# Patient Record
Sex: FEMALE | Race: WHITE | NOT HISPANIC OR LATINO | Employment: OTHER | URBAN - METROPOLITAN AREA
[De-identification: names, ages, dates, MRNs, and addresses within clinical notes are randomized per-mention and may not be internally consistent; named-entity substitution may affect disease eponyms.]

---

## 2017-01-06 ENCOUNTER — INFUSION (OUTPATIENT)
Dept: ONCOLOGY | Facility: HOSPITAL | Age: 82
End: 2017-01-06

## 2017-01-06 DIAGNOSIS — C83.30 DIFFUSE LARGE B-CELL LYMPHOMA, UNSPECIFIED BODY REGION (HCC): Primary | ICD-10-CM

## 2017-01-06 PROCEDURE — 25010000002 HEPARIN FLUSH (PORCINE) 100 UNIT/ML SOLUTION: Performed by: INTERNAL MEDICINE

## 2017-01-06 PROCEDURE — 96523 IRRIG DRUG DELIVERY DEVICE: CPT

## 2017-01-06 RX ORDER — SODIUM CHLORIDE 0.9 % (FLUSH) 0.9 %
10 SYRINGE (ML) INJECTION AS NEEDED
Status: DISCONTINUED | OUTPATIENT
Start: 2017-01-06 | End: 2017-01-09 | Stop reason: HOSPADM

## 2017-01-06 RX ORDER — SODIUM CHLORIDE 0.9 % (FLUSH) 0.9 %
10 SYRINGE (ML) INJECTION AS NEEDED
Status: CANCELLED | OUTPATIENT
Start: 2017-01-06

## 2017-01-06 RX ADMIN — SODIUM CHLORIDE, PRESERVATIVE FREE 500 UNITS: 5 INJECTION INTRAVENOUS at 10:24

## 2017-01-06 RX ADMIN — Medication 10 ML: at 10:24

## 2017-01-12 ENCOUNTER — TELEPHONE (OUTPATIENT)
Dept: ONCOLOGY | Facility: HOSPITAL | Age: 82
End: 2017-01-12

## 2017-01-12 NOTE — TELEPHONE ENCOUNTER
----- Message from Stacey Barboza sent at 1/12/2017 12:44 PM EST -----   Pt wants to get a handicap parking permit        [phone]    Message forwarded to appt desk as this is not done through nursing

## 2017-01-20 RX ORDER — TIZANIDINE 2 MG/1
TABLET ORAL
Qty: 45 TABLET | Refills: 0 | OUTPATIENT
Start: 2017-01-20

## 2017-01-24 ENCOUNTER — TELEPHONE (OUTPATIENT)
Dept: FAMILY MEDICINE CLINIC | Facility: CLINIC | Age: 82
End: 2017-01-24

## 2017-01-24 RX ORDER — TIZANIDINE 2 MG/1
TABLET ORAL
Qty: 45 TABLET | Refills: 0 | Status: SHIPPED | OUTPATIENT
Start: 2017-01-24 | End: 2017-01-26 | Stop reason: SDUPTHER

## 2017-01-25 RX ORDER — TIZANIDINE 2 MG/1
TABLET ORAL
Qty: 135 TABLET | Refills: 0 | OUTPATIENT
Start: 2017-01-25

## 2017-01-26 ENCOUNTER — OFFICE VISIT (OUTPATIENT)
Dept: FAMILY MEDICINE CLINIC | Facility: CLINIC | Age: 82
End: 2017-01-26

## 2017-01-26 VITALS
HEIGHT: 66 IN | DIASTOLIC BLOOD PRESSURE: 80 MMHG | OXYGEN SATURATION: 98 % | SYSTOLIC BLOOD PRESSURE: 122 MMHG | HEART RATE: 91 BPM | TEMPERATURE: 97.3 F | WEIGHT: 147 LBS | RESPIRATION RATE: 16 BRPM | BODY MASS INDEX: 23.63 KG/M2

## 2017-01-26 DIAGNOSIS — M62.838 MUSCLE SPASM OF LEFT LOWER EXTREMITY: Primary | ICD-10-CM

## 2017-01-26 PROCEDURE — 99212 OFFICE O/P EST SF 10 MIN: CPT | Performed by: FAMILY MEDICINE

## 2017-01-26 RX ORDER — TIZANIDINE 2 MG/1
TABLET ORAL
Qty: 45 TABLET | Refills: 11 | Status: SHIPPED | OUTPATIENT
Start: 2017-01-26

## 2017-01-26 NOTE — MR AVS SNAPSHOT
Guerita De La Fuente   1/26/2017 11:30 AM   Office Visit    Provider:  Vern Alvarez MD   Department:  [facility] FAMILY MEDICINE   Dept Phone:  [phone]                Your Full Care Plan              Today's Medication Changes          These changes are accurate as of: 1/26/17 11:53 AM.  If you have any questions, ask your nurse or doctor.               Stop taking medication(s)listed here:     cefdinir 300 MG capsule   Commonly known as:  OMNICEF   Stopped by:  Vern Alvarez MD           levoFLOXacin 500 MG tablet   Commonly known as:  LEVAQUIN   Stopped by:  Vern Alvarez MD                Where to Get Your Medications      These medications were sent to [email or web address] [address] [place] [address] [address] AT [facility]([place] - [phone] [facility] [phone] [facility], [facility] [address]     Phone:  [phone]     tiZANidine 2 MG tablet                  Your Updated Medication List          This list is accurate as of: 1/26/17 11:53 AM.  Always use your most recent med list.                acetaminophen 500 MG tablet   Commonly known as:  TYLENOL       calcium carbonate-cholecalciferol 500-400 MG-UNIT tablet tablet       estrogens (conjugated) 0.3 MG tablet   Commonly known as:  PREMARIN       glucosamine-chondroitin 500-400 MG capsule capsule       multivitamins-minerals capsule capsule       tiZANidine 2 MG tablet   Commonly known as:  ZANAFLEX   Take 1 and 1/2 pill daily               You Were Diagnosed With        Codes Comments    Muscle spasm of left lower extremity    -  Primary ICD-10-CM: M62.838  ICD-9-CM: 728.85       Instructions     None    Patient Instructions History      [employer] Signup     Our records indicate that you have an active [email or web address] account.    You can view your After Visit Summary by going to [employer] and logging in with your [employer] username and password.  If  "you don't have a [facility] username and password but a parent or guardian has access to your record, the parent or guardian should login with their own [facility] username and password and access your record to view the After Visit Summary.    If you have questions, you can email [email or web address] or call [phone] to talk to our LX[facility] staff.  Remember, LXSN is NOT to be used for urgent needs.  For medical emergencies, dial 911.               Other Info from Your Visit           Your Appointments     Feb 03, 2017 10:30 AM EST   Infusion Therapy Infusion with CHAIR 16 [facility] INFUSION CBC ([place])    [address] [facility] [address]  [place] [id number]   [phone]            Mar 03, 2017  8:00 AM EST   Infusion Therapy Infusion with CHAIR 16 [facility] INFUSION CBC ([place])    [address] [address]   [phone]            Mar 03, 2017 12:20 PM EST   Lab with LAB CHAIR 6 [facility] ONCOLOGY CBC LAB ([place])    [address] [facility] [address]  [place] [id number]   [phone]            Mar 03, 2017  1:00 PM EST   FOLLOW UP with Cyrus Ramírez MD   [facility] CBC GROUP: CONSULTANTS IN BLOOD DISORDERS AND CANCER ([facility])    [address] [facility] [address]  [facility] [address]   [phone]              Allergies     Allopurinol      Codeine  GI Intolerance    Levaquin [Levofloxacin]      Shellfish-derived Products  Other (See Comments)    \"CRABS\" \"I CAN'T REMEMBER WHAT THE REACTION WAS\"    Penicillins  Rash    Sulfa Antibiotics  Rash      Reason for Visit     Muscle spasms           Vital Signs     Blood Pressure Pulse Temperature Respirations Height Weight    122/80 (BP Location: Left arm, Patient Position: Sitting, Cuff Size: Adult) 91 97.3 °F (36.3 °C) (Oral) 16 65.5\" (166.4 cm) 147 lb (66.7 kg)    Oxygen Saturation Body Mass Index Smoking Status             98% " 24.09 kg/m2 Never Smoker         Problems and Diagnoses Noted     Muscle spasm of left lower extremity    -  Primary

## 2017-01-26 NOTE — PROGRESS NOTES
"Subjective   Guerita De La Fuente is a 83 y.o. female.     History of Present Illness     Chief Complaint:   Chief Complaint   Patient presents with   • Muscle spasms       Guerita De La Fuente 83 y.o. female who presents today for Medical Management of the below listed issues and medication refills.  she has a history of   Patient Active Problem List   Diagnosis   • Hip pain   • Diffuse large B cell lymphoma   • Drug rash   • Chemotherapy induced diarrhea   .  Since the last visit, she has been seeing the oncologist for lymphoma treatments.  she has been compliant with   Current Outpatient Prescriptions:   •  tiZANidine (ZANAFLEX) 2 MG tablet, Take 1 and 1/2 pill daily, Disp: 45 tablet, Rfl: 11  •  acetaminophen (TYLENOL) 500 MG tablet, Take 500 mg by mouth every 6 (six) hours as needed for mild pain (1-3)., Disp: , Rfl:   •  calcium carbonate-cholecalciferol 500-400 MG-UNIT tablet tablet, Take 1 tablet by mouth 3 (three) times a day., Disp: , Rfl:   •  estrogens, conjugated, (PREMARIN) 0.3 MG tablet, Take 0.3 mg by mouth daily., Disp: , Rfl:   •  glucosamine-chondroitin 500-400 MG capsule capsule, Take 1 capsule by mouth daily. PT HOLDING FOR SURGERY, Disp: , Rfl:   •  Multiple Vitamins-Minerals (PRESERVISION AREDS 2) capsule, Take 1 capsule by mouth 2 (two) times a day., Disp: , Rfl: .  she denies medication side effects.    All of the chronic condition(s) listed above are stable w/o issues.    Visit Vitals   • /80 (BP Location: Left arm, Patient Position: Sitting, Cuff Size: Adult)   • Pulse 91   • Temp 97.3 °F (36.3 °C) (Oral)   • Resp 16   • Ht 65.5\" (166.4 cm)   • Wt 147 lb (66.7 kg)   • SpO2 98%   • BMI 24.09 kg/m2       Results for orders placed or performed in visit on 12/09/16   Comprehensive Metabolic Panel   Result Value Ref Range    Glucose 85 74 - 124 mg/dL    BUN 19 6 - 20 mg/dL    Creatinine 0.69 0.60 - 1.10 mg/dL    Sodium 138 134 - 145 mmol/L    Potassium 4.1 3.5 - 4.7 mmol/L    Chloride 101 98 - " 107 mmol/L    CO2 24.5 22.0 - 29.0 mmol/L    Calcium 8.9 8.5 - 10.2 mg/dL    Total Protein 6.7 6.3 - 8.0 g/dL    Albumin 3.90 3.50 - 5.20 g/dL    ALT (SGPT) 19 0 - 33 U/L    AST (SGOT) 35 (H) 0 - 32 U/L    Alkaline Phosphatase 99 38 - 116 U/L    Total Bilirubin 0.2 0.1 - 1.2 mg/dL    eGFR Non African Amer 81 >60 mL/min/1.73    Globulin 2.8 1.8 - 3.5 gm/dL    A/G Ratio 1.4 1.1 - 2.4 g/dL    BUN/Creatinine Ratio 27.5 7.3 - 30.0    Anion Gap 12.5 mmol/L   Lactate Dehydrogenase   Result Value Ref Range     (H) 99 - 259 U/L   CBC Auto Differential   Result Value Ref Range    WBC 5.60 4.00 - 10.00 10*3/mm3    RBC 4.12 3.90 - 5.00 10*6/mm3    Hemoglobin 11.7 11.5 - 14.9 g/dL    Hematocrit 34.5 34.0 - 45.0 %    MCV 83.7 83.0 - 97.0 fL    MCH 28.4 27.0 - 33.0 pg    MCHC 33.9 32.0 - 35.0 g/dL    RDW 13.5 11.7 - 14.5 %    RDW-SD 40.7 37.0 - 49.0 fl    MPV 9.2 8.9 - 12.1 fL    Platelets 211 150 - 375 10*3/mm3    Neutrophil % 62.5 39.0 - 75.0 %    Lymphocyte % 25.2 20.0 - 49.0 %    Monocyte % 8.9 4.0 - 12.0 %    Eosinophil % 2.3 1.0 - 5.0 %    Basophil % 0.7 0.0 - 1.1 %    Immature Grans % 0.4 0.0 - 0.5 %    Neutrophils, Absolute 3.50 1.50 - 7.00 10*3/mm3    Lymphocytes, Absolute 1.41 1.00 - 3.50 10*3/mm3    Monocytes, Absolute 0.50 0.25 - 0.80 10*3/mm3    Eosinophils, Absolute 0.13 0.00 - 0.36 10*3/mm3    Basophils, Absolute 0.04 0.00 - 0.10 10*3/mm3    Immature Grans, Absolute 0.02 0.00 - 0.03 10*3/mm3    nRBC 0.0 0.0 - 0.0 /100 WBC   Gold Top - SST   Result Value Ref Range    Extra Tube Hold for add-ons.          The following portions of the patient's history were reviewed and updated as appropriate: allergies, current medications, past family history, past medical history, past social history, past surgical history and problem list.    Review of Systems   Constitutional: Negative for activity change, chills, fatigue and fever.   Respiratory: Negative for cough and chest tightness.    Cardiovascular: Negative for  chest pain and palpitations.   Gastrointestinal: Negative for abdominal pain and nausea.   Endocrine: Negative for cold intolerance.   Psychiatric/Behavioral: Negative for behavioral problems and dysphoric mood.       Objective   Physical Exam   Constitutional: She appears well-developed and well-nourished.   Neck: Neck supple. No thyromegaly present.   Cardiovascular: Normal rate and regular rhythm.    No murmur heard.  Pulmonary/Chest: Effort normal and breath sounds normal.   Abdominal: Bowel sounds are normal.   Psychiatric: She has a normal mood and affect. Her behavior is normal.   Nursing note and vitals reviewed.      Assessment/Plan   Guerita was seen today for muscle spasms.    Diagnoses and all orders for this visit:    Muscle spasm of left lower extremity  -     tiZANidine (ZANAFLEX) 2 MG tablet; Take 1 and 1/2 pill daily

## 2017-01-27 ENCOUNTER — TELEPHONE (OUTPATIENT)
Dept: ONCOLOGY | Facility: HOSPITAL | Age: 82
End: 2017-01-27

## 2017-01-27 NOTE — TELEPHONE ENCOUNTER
Attempted to call. No answer, left message stating it is ok for flu shot.     ----- Message from Cyrus Ramírez MD sent at 1/26/2017  9:15 PM EST -----  Please let her know it's OK to get a flu shot.    ----- Message -----     From: Vern Alvarez MD     Sent: 1/26/2017  11:48 AM       To: Cyrus Ramírez MD    Our mutual patient is asking me if she can receive a Flu shot. I asked her to check with your office. If you don't mind having one of your staff contact her with this I would appreciate it! Have a great day.

## 2017-02-03 ENCOUNTER — INFUSION (OUTPATIENT)
Dept: ONCOLOGY | Facility: HOSPITAL | Age: 82
End: 2017-02-03

## 2017-02-03 DIAGNOSIS — C83.30 DIFFUSE LARGE B-CELL LYMPHOMA, UNSPECIFIED BODY REGION (HCC): Primary | ICD-10-CM

## 2017-02-03 PROCEDURE — 25010000002 HEPARIN FLUSH (PORCINE) 100 UNIT/ML SOLUTION: Performed by: INTERNAL MEDICINE

## 2017-02-03 PROCEDURE — 96523 IRRIG DRUG DELIVERY DEVICE: CPT

## 2017-02-03 RX ORDER — SODIUM CHLORIDE 0.9 % (FLUSH) 0.9 %
10 SYRINGE (ML) INJECTION AS NEEDED
Status: CANCELLED | OUTPATIENT
Start: 2017-02-03

## 2017-02-03 RX ORDER — SODIUM CHLORIDE 0.9 % (FLUSH) 0.9 %
10 SYRINGE (ML) INJECTION AS NEEDED
Status: DISCONTINUED | OUTPATIENT
Start: 2017-02-03 | End: 2017-02-03 | Stop reason: HOSPADM

## 2017-02-03 RX ADMIN — Medication 10 ML: at 10:29

## 2017-02-03 RX ADMIN — SODIUM CHLORIDE, PRESERVATIVE FREE 500 UNITS: 5 INJECTION INTRAVENOUS at 10:29

## 2017-02-22 RX ORDER — TIZANIDINE 2 MG/1
TABLET ORAL
Qty: 45 TABLET | Refills: 0 | OUTPATIENT
Start: 2017-02-22

## 2017-03-02 PROBLEM — Z45.2 FITTING AND ADJUSTMENT OF VASCULAR CATHETER: Status: ACTIVE | Noted: 2017-03-02

## 2017-03-03 ENCOUNTER — HOSPITAL ENCOUNTER (EMERGENCY)
Facility: HOSPITAL | Age: 82
Discharge: HOME | End: 2017-03-03
Attending: EMERGENCY MEDICINE | Admitting: EMERGENCY MEDICINE

## 2017-03-03 ENCOUNTER — APPOINTMENT (OUTPATIENT)
Dept: ONCOLOGY | Facility: CLINIC | Age: 82
End: 2017-03-03

## 2017-03-03 ENCOUNTER — APPOINTMENT (OUTPATIENT)
Dept: CT IMAGING | Facility: HOSPITAL | Age: 82
End: 2017-03-03

## 2017-03-03 ENCOUNTER — APPOINTMENT (OUTPATIENT)
Dept: GENERAL RADIOLOGY | Facility: HOSPITAL | Age: 82
End: 2017-03-03

## 2017-03-03 ENCOUNTER — APPOINTMENT (OUTPATIENT)
Dept: ONCOLOGY | Facility: HOSPITAL | Age: 82
End: 2017-03-03

## 2017-03-03 ENCOUNTER — APPOINTMENT (OUTPATIENT)
Dept: LAB | Facility: HOSPITAL | Age: 82
End: 2017-03-03

## 2017-03-03 ENCOUNTER — OFFICE VISIT (OUTPATIENT)
Dept: RETAIL CLINIC | Facility: CLINIC | Age: 82
End: 2017-03-03

## 2017-03-03 VITALS — RESPIRATION RATE: 22 BRPM | OXYGEN SATURATION: 95 % | TEMPERATURE: 97.7 F | HEART RATE: 94 BPM

## 2017-03-03 VITALS
SYSTOLIC BLOOD PRESSURE: 100 MMHG | HEIGHT: 66 IN | DIASTOLIC BLOOD PRESSURE: 84 MMHG | RESPIRATION RATE: 17 BRPM | TEMPERATURE: 96.9 F | OXYGEN SATURATION: 97 % | BODY MASS INDEX: 23.03 KG/M2 | WEIGHT: 143.3 LBS | HEART RATE: 63 BPM

## 2017-03-03 DIAGNOSIS — R05.9 COUGH: Primary | ICD-10-CM

## 2017-03-03 DIAGNOSIS — R50.9 LOW GRADE FEVER: ICD-10-CM

## 2017-03-03 DIAGNOSIS — R53.1 GENERAL WEAKNESS: Primary | ICD-10-CM

## 2017-03-03 DIAGNOSIS — M54.9 UPPER BACK PAIN: ICD-10-CM

## 2017-03-03 DIAGNOSIS — R06.02 SHORT OF BREATH ON EXERTION: ICD-10-CM

## 2017-03-03 LAB
ALBUMIN SERPL-MCNC: 3.9 G/DL (ref 3.5–5.2)
ALBUMIN/GLOB SERPL: 1.2 G/DL
ALP SERPL-CCNC: 160 U/L (ref 39–117)
ALT SERPL W P-5'-P-CCNC: 28 U/L (ref 1–33)
ANION GAP SERPL CALCULATED.3IONS-SCNC: 15.6 MMOL/L
AST SERPL-CCNC: 136 U/L (ref 1–32)
BASOPHILS # BLD AUTO: 0.06 10*3/MM3 (ref 0–0.2)
BASOPHILS NFR BLD AUTO: 0.8 % (ref 0–1.5)
BILIRUB SERPL-MCNC: 0.4 MG/DL (ref 0.1–1.2)
BUN BLD-MCNC: 18 MG/DL (ref 8–23)
BUN/CREAT SERPL: 23.7 (ref 7–25)
CALCIUM SPEC-SCNC: 9.3 MG/DL (ref 8.6–10.5)
CHLORIDE SERPL-SCNC: 98 MMOL/L (ref 98–107)
CO2 SERPL-SCNC: 23.4 MMOL/L (ref 22–29)
CREAT BLD-MCNC: 0.76 MG/DL (ref 0.57–1)
D DIMER PPP FEU-MCNC: 1.26 MCGFEU/ML (ref 0–0.49)
DEPRECATED RDW RBC AUTO: 45.1 FL (ref 37–54)
EOSINOPHIL # BLD AUTO: 0.04 10*3/MM3 (ref 0–0.7)
EOSINOPHIL NFR BLD AUTO: 0.5 % (ref 0.3–6.2)
ERYTHROCYTE [DISTWIDTH] IN BLOOD BY AUTOMATED COUNT: 14.4 % (ref 11.7–13)
FLUAV AG NPH QL: NEGATIVE
FLUBV AG NPH QL IA: NEGATIVE
GFR SERPL CREATININE-BSD FRML MDRD: 73 ML/MIN/1.73
GLOBULIN UR ELPH-MCNC: 3.2 GM/DL
GLUCOSE BLD-MCNC: 103 MG/DL (ref 65–99)
HCT VFR BLD AUTO: 36 % (ref 35.6–45.5)
HGB BLD-MCNC: 12.1 G/DL (ref 11.9–15.5)
HOLD SPECIMEN: NORMAL
HOLD SPECIMEN: NORMAL
IMM GRANULOCYTES # BLD: 0.02 10*3/MM3 (ref 0–0.03)
IMM GRANULOCYTES NFR BLD: 0.3 % (ref 0–0.5)
LYMPHOCYTES # BLD AUTO: 1.43 10*3/MM3 (ref 0.9–4.8)
LYMPHOCYTES NFR BLD AUTO: 19.1 % (ref 19.6–45.3)
MCH RBC QN AUTO: 28.5 PG (ref 26.9–32)
MCHC RBC AUTO-ENTMCNC: 33.6 G/DL (ref 32.4–36.3)
MCV RBC AUTO: 84.7 FL (ref 80.5–98.2)
MONOCYTES # BLD AUTO: 0.74 10*3/MM3 (ref 0.2–1.2)
MONOCYTES NFR BLD AUTO: 9.9 % (ref 5–12)
NEUTROPHILS # BLD AUTO: 5.2 10*3/MM3 (ref 1.9–8.1)
NEUTROPHILS NFR BLD AUTO: 69.4 % (ref 42.7–76)
NT-PROBNP SERPL-MCNC: 212.5 PG/ML (ref 0–1800)
PLATELET # BLD AUTO: 196 10*3/MM3 (ref 140–500)
PMV BLD AUTO: 10.3 FL (ref 6–12)
POTASSIUM BLD-SCNC: 4.2 MMOL/L (ref 3.5–5.2)
PROT SERPL-MCNC: 7.1 G/DL (ref 6–8.5)
RBC # BLD AUTO: 4.25 10*6/MM3 (ref 3.9–5.2)
SODIUM BLD-SCNC: 137 MMOL/L (ref 136–145)
TROPONIN T SERPL-MCNC: <0.01 NG/ML (ref 0–0.03)
WBC NRBC COR # BLD: 7.49 10*3/MM3 (ref 4.5–10.7)
WHOLE BLOOD HOLD SPECIMEN: NORMAL

## 2017-03-03 PROCEDURE — 71020 HC CHEST PA AND LATERAL: CPT

## 2017-03-03 PROCEDURE — 93010 ELECTROCARDIOGRAM REPORT: CPT | Performed by: INTERNAL MEDICINE

## 2017-03-03 PROCEDURE — 99213 OFFICE O/P EST LOW 20 MIN: CPT | Performed by: NURSE PRACTITIONER

## 2017-03-03 PROCEDURE — 93005 ELECTROCARDIOGRAM TRACING: CPT | Performed by: EMERGENCY MEDICINE

## 2017-03-03 PROCEDURE — 83880 ASSAY OF NATRIURETIC PEPTIDE: CPT | Performed by: EMERGENCY MEDICINE

## 2017-03-03 PROCEDURE — 87804 INFLUENZA ASSAY W/OPTIC: CPT | Performed by: EMERGENCY MEDICINE

## 2017-03-03 PROCEDURE — 80053 COMPREHEN METABOLIC PANEL: CPT | Performed by: EMERGENCY MEDICINE

## 2017-03-03 PROCEDURE — 85025 COMPLETE CBC W/AUTO DIFF WBC: CPT | Performed by: EMERGENCY MEDICINE

## 2017-03-03 PROCEDURE — 99283 EMERGENCY DEPT VISIT LOW MDM: CPT

## 2017-03-03 PROCEDURE — 25010000002 KETOROLAC TROMETHAMINE PER 15 MG: Performed by: EMERGENCY MEDICINE

## 2017-03-03 PROCEDURE — 96374 THER/PROPH/DIAG INJ IV PUSH: CPT

## 2017-03-03 PROCEDURE — 84484 ASSAY OF TROPONIN QUANT: CPT | Performed by: EMERGENCY MEDICINE

## 2017-03-03 PROCEDURE — 0 IOPAMIDOL PER 1 ML: Performed by: EMERGENCY MEDICINE

## 2017-03-03 PROCEDURE — 85379 FIBRIN DEGRADATION QUANT: CPT | Performed by: EMERGENCY MEDICINE

## 2017-03-03 PROCEDURE — 71275 CT ANGIOGRAPHY CHEST: CPT

## 2017-03-03 RX ORDER — KETOROLAC TROMETHAMINE 15 MG/ML
15 INJECTION, SOLUTION INTRAMUSCULAR; INTRAVENOUS ONCE
Status: COMPLETED | OUTPATIENT
Start: 2017-03-03 | End: 2017-03-03

## 2017-03-03 RX ORDER — SODIUM CHLORIDE 0.9 % (FLUSH) 0.9 %
10 SYRINGE (ML) INJECTION AS NEEDED
Status: DISCONTINUED | OUTPATIENT
Start: 2017-03-03 | End: 2017-03-03 | Stop reason: HOSPADM

## 2017-03-03 RX ORDER — ASPIRIN 325 MG
325 TABLET ORAL ONCE
Status: COMPLETED | OUTPATIENT
Start: 2017-03-03 | End: 2017-03-03

## 2017-03-03 RX ADMIN — IOPAMIDOL 74 ML: 755 INJECTION, SOLUTION INTRAVENOUS at 13:40

## 2017-03-03 RX ADMIN — KETOROLAC TROMETHAMINE 15 MG: 15 INJECTION, SOLUTION INTRAMUSCULAR; INTRAVENOUS at 12:24

## 2017-03-03 RX ADMIN — ASPIRIN 325 MG: 325 TABLET ORAL at 12:23

## 2017-03-03 NOTE — PROGRESS NOTES
"Subjective   Patient ID: Guerita De La Fuente is a 83 y.o. female presents with No chief complaint on file.      HPI Comments: 82 yo wf  PMH: non-hodgkins lymphoma groin, supra clavicular and L axilla  Cc: fever tmax 100.1 x 1 week, intermittent cough non-productive with mild SOB worse on exertion and weakness x 8 days. Assoc pain to L pos chest. She has had to decrease her activity over past 2 weeks. She did have a pneumovax and flu shot this year. She was treated for bronchitis in January and took abx and saw good improvement. She also takes premarin daily. No hx DVT or PE in the past. Pt had oncology appt today but it was cancelled by MD due to emergency.        Allergies   Allergen Reactions   • Allopurinol    • Codeine GI Intolerance   • Levaquin [Levofloxacin]    • Shellfish-Derived Products Other (See Comments)     \"CRABS\" \"I CAN'T REMEMBER WHAT THE REACTION WAS\"   • Penicillins Rash   • Sulfa Antibiotics Rash       The following portions of the patient's history were reviewed and updated as appropriate: allergies, current medications, past family history, past medical history, past social history, past surgical history and problem list.      Review of Systems   Constitutional: Positive for fatigue and fever. Negative for activity change, chills and unexpected weight change.   HENT: Negative for congestion, ear pain, postnasal drip, rhinorrhea, sinus pressure and sore throat.         Hoarseness   Eyes: Negative for pain and discharge.   Respiratory: Positive for cough and shortness of breath. Negative for chest tightness and wheezing.    Cardiovascular: Negative for chest pain, palpitations and leg swelling.   Gastrointestinal: Negative for abdominal pain, diarrhea and vomiting.   Endocrine: Negative.    Genitourinary: Negative.    Musculoskeletal: Negative for joint swelling and neck pain.   Skin: Negative for color change, rash and wound.   Allergic/Immunologic: Negative.    Neurological: Negative for " dizziness, seizures, syncope and headaches.   Psychiatric/Behavioral: Negative.        Objective     Vitals:    03/03/17 1047   Pulse: 94   Resp: 22   Temp: 97.7 °F (36.5 °C)   SpO2: 95%         Physical Exam   Constitutional: She is oriented to person, place, and time. She appears well-developed and well-nourished.  Non-toxic appearance. No distress.   HENT:   Head: Normocephalic and atraumatic. Hair is normal.   Right Ear: Hearing, tympanic membrane, external ear and ear canal normal. No drainage, swelling or tenderness.   Left Ear: Hearing, tympanic membrane, external ear and ear canal normal. No drainage, swelling or tenderness.   Nose: Nose normal. No mucosal edema. No epistaxis.   Mouth/Throat: Uvula is midline, oropharynx is clear and moist and mucous membranes are normal. No oral lesions. No uvula swelling. No oropharyngeal exudate or posterior oropharyngeal erythema. Tonsils are 0 on the right. Tonsils are 0 on the left. No tonsillar exudate.   Eyes: Conjunctivae, EOM and lids are normal. Pupils are equal, round, and reactive to light. Right eye exhibits no discharge. Left eye exhibits no discharge. No scleral icterus.   Neck: Normal range of motion. Neck supple.   Cardiovascular: Normal rate, regular rhythm and normal heart sounds.  Exam reveals no gallop.    No murmur heard.  No LE edema noted   Pulmonary/Chest: No stridor. Tachypnea (resp rate 22 at rest) noted. No respiratory distress. She has no decreased breath sounds. She has no wheezes. She has no rales. She exhibits no tenderness.   Abdominal: Soft. There is no tenderness.   Lymphadenopathy:        Head (right side): No tonsillar adenopathy present.        Head (left side): No tonsillar adenopathy present.     She has no cervical adenopathy.   Neurological: She is alert and oriented to person, place, and time. She exhibits normal muscle tone.   Skin: Skin is warm and dry. No rash noted. She is not diaphoretic.   Psychiatric: She has a normal mood  and affect. Her behavior is normal. Judgment and thought content normal.   Nursing note and vitals reviewed.        Diagnoses and all orders for this visit:    Cough  -     Cancel: XR Chest PA & Lateral    Short of breath on exertion    I have advised Guerita to seek further eval at local Er. She was offered ambulance but declines insisting on driving herself.  She has a son nearby who can meet her there.  I do believe that she is stable to drive herself.  We will assist her to her vehicle by W/C.     Follow-up with Primary Care Physician in 24-48 hours if these symptoms worsen or fail to improve as anticipated.

## 2017-03-03 NOTE — ED PROVIDER NOTES
" EMERGENCY DEPARTMENT ENCOUNTER    CHIEF COMPLAINT  Chief Complaint: Back Pain  History given by: Patient  History limited by: Nothing  Room Number: 10/10  PMD: Vern Alvarez MD      HPI:  Pt is a 83 y.o. female presents complaining of intermittent, mid/ upper back pain with associated productive cough, SOA, generalized weakness and low grade fever as high as 100 for the past week. She states that her throat has also been mildly \"scratchy\" since onset as well. She denies back pain with inspiration. The patient has h/o lymphoma but is not actively receiving chemotherapy treatments. She denies nausea, pedal edema, hemoptysis, vomiting or dysuria since onset. She was seen by an APRN PTA and was sent to the ED for further evaluation to rule out PE.The patient received the flu vaccine 6 weeks ago and has no other complaints.     Duration:  One week  Onset: Gradual  Timing: Constant  Location: Generalized  Radiation: None  Quality: Weakness  Intensity/Severity: Moderate  Progression: No Change  Associated Symptoms: Back pain, cough, SOA, generalized weakness, fever, \"scratchy\" throat   Aggravating Factors: None  Alleviating Factors: None  Previous Episodes: None mentioned   Treatment before arrival: None mentioned     PAST MEDICAL HISTORY  Active Ambulatory Problems     Diagnosis Date Noted   • Hip pain 11/17/2015   • Diffuse large B cell lymphoma 04/21/2016   • Drug rash 05/20/2016   • Chemotherapy induced diarrhea 06/17/2016   • Fitting and adjustment of vascular catheter 03/02/2017   • Short of breath on exertion 03/03/2017   • Cough 03/03/2017     Resolved Ambulatory Problems     Diagnosis Date Noted   • No Resolved Ambulatory Problems     Past Medical History   Diagnosis Date   • Arthritis    • Cancer    • GERD (gastroesophageal reflux disease)    • History of transfusion    • Macular degeneration of both eyes    • Muscle ache    • Torn ligament        PAST SURGICAL HISTORY  Past Surgical History   Procedure " "Laterality Date   • Mammo bilateral  2014     normal   • Orthopedic surgery       hip right 4 pins   • Hysterectomy     • Tonsillectomy and adenoidectomy     • Eye surgery Bilateral      cataracts   • Carpal tunnel release Bilateral    • Hip pinning Right 07/1992   • Vein surgery Bilateral      LEGS   • Pr insj tunneled cvc w/o subq port/ age 5 yr/> N/A 4/29/2016     Procedure: INSERTION POWERPORT WITH FLURO;  Surgeon: Mihir Arrieta MD;  [facility] [place] [facility]  Service: General       FAMILY HISTORY  Family History   Problem Relation Age of Onset   • Alcohol abuse Mother    • Cancer Brother 65     lung cancer       SOCIAL HISTORY  Social History     Social History   • Marital status: Single     Spouse name: N/A   • Number of children: N/A   • Years of education: College     Occupational History   • RN Retired     Social History Main Topics   • Smoking status: Never Smoker   • Smokeless tobacco: Never Used   • Alcohol use No   • Drug use: No   • Sexual activity: Defer     Other Topics Concern   • Not on file     Social History Narrative       ALLERGIES  Allopurinol; Codeine; Levaquin [levofloxacin]; Shellfish-derived products; Penicillins; and Sulfa antibiotics    REVIEW OF SYSTEMS  Review of Systems   Constitutional: Positive for fever. Negative for chills and fatigue.   HENT: Positive for sore throat (\"scratchy\"). Negative for congestion and rhinorrhea.    Eyes: Negative for pain.   Respiratory: Positive for cough and shortness of breath. Negative for wheezing.    Cardiovascular: Negative for chest pain, palpitations and leg swelling.   Gastrointestinal: Negative for abdominal pain, diarrhea, nausea and vomiting.   Genitourinary: Negative for difficulty urinating, dysuria, flank pain and frequency.   Musculoskeletal: Positive for back pain. Negative for arthralgias, myalgias, neck pain and neck stiffness.   Skin: Negative for rash.   Neurological: Positive for weakness. Negative for dizziness, speech " difficulty, light-headedness, numbness and headaches.   Psychiatric/Behavioral: Negative.    All other systems reviewed and are negative.      PHYSICAL EXAM  ED Triage Vitals   Temp Heart Rate Resp BP SpO2   03/03/17 1135 03/03/17 1135 03/03/17 1135 03/03/17 1155 03/03/17 1135   97.6 °F (36.4 °C) 113 20 144/72 99 %      Temp src Heart Rate Source Patient Position BP Location FiO2 (%)   03/03/17 1135 03/03/17 1135 03/03/17 1155 03/03/17 1155 --   Tympanic Monitor Lying Left arm        Physical Exam   Constitutional: She is oriented to person, place, and time and well-developed, well-nourished, and in no distress. No distress.   HENT:   Head: Normocephalic and atraumatic.   Eyes: EOM are normal. Pupils are equal, round, and reactive to light.   Neck: Normal range of motion. Neck supple.   Cardiovascular: Normal rate, regular rhythm and normal heart sounds.    Pulmonary/Chest: Effort normal and breath sounds normal. No respiratory distress.   Abdominal: Soft. There is no tenderness. There is no rebound and no guarding.   Musculoskeletal: Normal range of motion. She exhibits no edema.   Neurological: She is alert and oriented to person, place, and time. She has normal sensation and normal strength.   Skin: Skin is warm and dry. No rash noted.   Psychiatric: Mood and affect normal.   Nursing note and vitals reviewed.      LAB RESULTS  Lab Results (last 24 hours)     Procedure Component Value Units Date/Time    CBC & Differential [[id number]] Collected:  03/03/17 1205    Specimen:  Blood Updated:  03/03/17 1221    Narrative:       The following orders were created for panel order CBC & Differential.  Procedure                               Abnormality         Status                     ---------                               -----------         ------                     CBC Auto Differential[[id number]]         Abnormal            Final result                 Please view results for these tests on the individual orders.     Comprehensive Metabolic Panel [[id number]]  (Abnormal) Collected:  03/03/17 1205    Specimen:  Blood Updated:  03/03/17 1245     Glucose 103 (H) mg/dL      BUN 18 mg/dL      Creatinine 0.76 mg/dL      Sodium 137 mmol/L      Potassium 4.2 mmol/L      Chloride 98 mmol/L      CO2 23.4 mmol/L      Calcium 9.3 mg/dL      Total Protein 7.1 g/dL      Albumin 3.90 g/dL      ALT (SGPT) 28 U/L      AST (SGOT) 136 (H) U/L      Alkaline Phosphatase 160 (H) U/L      Total Bilirubin 0.4 mg/dL      eGFR Non African Amer 73 mL/min/1.73      Globulin 3.2 gm/dL      A/G Ratio 1.2 g/dL      BUN/Creatinine Ratio 23.7      Anion Gap 15.6 mmol/L     Narrative:       The MDRD GFR formula is only valid for adults with stable renal function between ages 18 and 70.    Troponin [[id number]]  (Normal) Collected:  03/03/17 1205    Specimen:  Blood Updated:  03/03/17 1245     Troponin T <0.010 ng/mL     Narrative:       Troponin T Reference Ranges:  Less than 0.03 ng/mL:    Negative for AMI  0.03 to 0.09 ng/mL:      Indeterminant for AMI  Greater than 0.09 ng/mL: Positive for AMI    CBC Auto Differential [[id number]]  (Abnormal) Collected:  03/03/17 1205    Specimen:  Blood Updated:  03/03/17 1221     WBC 7.49 10*3/mm3      RBC 4.25 10*6/mm3      Hemoglobin 12.1 g/dL      Hematocrit 36.0 %      MCV 84.7 fL      MCH 28.5 pg      MCHC 33.6 g/dL      RDW 14.4 (H) %      RDW-SD 45.1 fl      MPV 10.3 fL      Platelets 196 10*3/mm3      Neutrophil % 69.4 %      Lymphocyte % 19.1 (L) %      Monocyte % 9.9 %      Eosinophil % 0.5 %      Basophil % 0.8 %      Immature Grans % 0.3 %      Neutrophils, Absolute 5.20 10*3/mm3      Lymphocytes, Absolute 1.43 10*3/mm3      Monocytes, Absolute 0.74 10*3/mm3      Eosinophils, Absolute 0.04 10*3/mm3      Basophils, Absolute 0.06 10*3/mm3      Immature Grans, Absolute 0.02 10*3/mm3     BNP [[id number]]  (Normal) Collected:  03/03/17 1206    Specimen:  Blood Updated:  03/03/17 1330     proBNP 212.5 pg/mL     Narrative:        Among patients with dyspnea, NT-proBNP is highly sensitive for the detection of acute congestive heart failure. In addition NT-proBNP of <300 pg/ml effectively rules out acute congestive heart failure with 99% negative predictive value.    D-dimer, Quantitative [[id number]]  (Abnormal) Collected:  03/03/17 1206    Specimen:  Blood Updated:  03/03/17 1239     D-Dimer, Quantitative 1.26 (H) MCGFEU/mL     Narrative:       The Stago D-Dimer test used in conjunction with a clinical pretest probability (PTP) assessment model, has been approved by the FDA to rule out the presence of venous thromboembolism (VTE) in outpatients suspected of deep venous thrombosis (DVT) or pulmonary embolism (PE).     Influenza Antigen [[id number]]  (Normal) Collected:  03/03/17 1206    Specimen:  Swab from Nasopharynx Updated:  03/03/17 1312     Influenza A Ag, EIA Negative      Influenza B Ag, EIA Negative           I ordered the above labs and reviewed the results    RADIOLOGY  CT Angiogram Chest With Contrast   Final Result   1. There is no pulmonary embolism.   2. There are enlarging nodes in the AP window and left hilum, concerning   for recurrent lymphoma. PET/CT may be helpful for further evaluation.   3. There is a 1 cm groundglass vaguely nodular opacity in the central   right upper lobe. A focal area of inflammation is present along the   anterior inferior left lower lobe and there is an additional new 7 mm   nodule left lower lobe. I suspect these findings are inflammatory in   etiology, however, continued CT followup is recommended.       This report was finalized on 3/3/2017 4:54 PM by Dr. Jose Alberto Little MD.          XR Chest 2 View   Final Result   No focal pulmonary consolidation. COPD change. Follow-up as   clinical indications persist.       This report was finalized on 3/3/2017 1:24 PM by Dr. Leroy España MD.             15:07  Reviewed CTA Chest  -  No PE. Enlargement randa region which could be consistent with  recurrent lymphoma. There is also ground glass opacity which appears to be inflammatory. Independently viewed by me. Interpreted by radiologist.      I ordered the above noted radiological studies. Interpreted by radiologist. Reviewed by me in PACS.       PROCEDURES  Procedures  EKG           EKG time: 11:41  Rhythm/Rate: NSR 92  P waves and NV: Normal  QRS, axis: Normal   ST and T waves: Normal     Interpreted Contemporaneously by me, independently viewed  unchanged compared to prior 4/25/16      PROGRESS AND CONSULTS  ED Course     11:37  Ordered Labs, CXR and EKG for further evaluation. Also ordered ASA and Toradol for pain control.      15:07  Rechecked patient and they are resting. Patient reports that her back pain has resolved. Discussed the patient's pertinent labs and imaging results, including CTA Chest shows areas that could possibly represent recurrent lymphoma. Her flu screen was also negative. The patient has a follow up with her Oncologist next week and I advised her to keep this appointment. I advised the patient to take Aleve if the back pain persists. Discussed plan to discharge the patient home and recommended f/u with . Patient agrees with the plan and all questions were addressed.     Latest vital signs   BP- 100/84 HR- 74 Temp- 97.6 °F (36.4 °C) (Tympanic) O2 sat- 97%        MEDICAL DECISION MAKING  Results were reviewed/discussed with the patient and they were also made aware of online access. Pt also made aware that some labs, such as cultures, will not be resulted during ER visit and follow up with PMD is necessary.     MDM  Number of Diagnoses or Management Options     Amount and/or Complexity of Data Reviewed  Clinical lab tests: ordered and reviewed  Tests in the radiology section of CPT®: ordered and reviewed  Tests in the medicine section of CPT®: reviewed and ordered (See EKG)  Decide to obtain previous medical records or to obtain history from someone other than the patient:  yes  Review and summarize past medical records: yes (Patient went to [facility] today for non-productive cough, fever and SOA for the past week)    Patient Progress  Patient progress: stable         DIAGNOSIS  Final diagnoses:   General weakness   Low grade fever   Upper back pain       DISPOSITION  DISCHARGE    Patient discharged in stable condition.    Reviewed implications of results, diagnosis, meds, responsibility to follow up, warning signs and symptoms of possible worsening, potential complications and reasons to return to ER, including worsening back pain or SOA.    Patient/Family voiced understanding of above instructions.    Discussed plan for discharge, as there is no emergent indication for admission.  Pt/family is agreeable and understands need for follow up and repeat testing.  Pt is aware that discharge does not mean that nothing is wrong but it indicates no emergency is present that requires admission and they must continue care with follow-up as given below or physician of their choice.     FOLLOW-UP  Cyrus Ramírez MD  [address]  [place] [id number] [phone]      as scheduled    Vern Alvarez MD  [address]  [place] [id number] [phone]    Schedule an appointment as soon as possible for a visit           Medication List      Notice     No changes were made to your prescriptions during this visit.          Latest Documented Vital Signs:  As of 3:09 PM  BP- 100/84 HR- 74 Temp- 97.6 °F (36.4 °C) (Tympanic) O2 sat- 97%    --  Documentation assistance provided by beni Chavez for .  Information recorded by the beni was done at my direction and has been verified and validated by me.          Dominick Chavez  03/03/17 1515       Scottie Sanchez MD  03/03/17 1425

## 2017-03-20 ENCOUNTER — INFUSION (OUTPATIENT)
Dept: ONCOLOGY | Facility: HOSPITAL | Age: 82
End: 2017-03-20

## 2017-03-20 ENCOUNTER — OFFICE VISIT (OUTPATIENT)
Dept: ONCOLOGY | Facility: CLINIC | Age: 82
End: 2017-03-20

## 2017-03-20 ENCOUNTER — TELEPHONE (OUTPATIENT)
Dept: ONCOLOGY | Facility: HOSPITAL | Age: 82
End: 2017-03-20

## 2017-03-20 VITALS
HEIGHT: 66 IN | OXYGEN SATURATION: 96 % | TEMPERATURE: 97.9 F | RESPIRATION RATE: 16 BRPM | WEIGHT: 140 LBS | SYSTOLIC BLOOD PRESSURE: 128 MMHG | DIASTOLIC BLOOD PRESSURE: 58 MMHG | BODY MASS INDEX: 22.5 KG/M2 | HEART RATE: 108 BPM

## 2017-03-20 DIAGNOSIS — Z45.2 FITTING AND ADJUSTMENT OF VASCULAR CATHETER: ICD-10-CM

## 2017-03-20 DIAGNOSIS — C83.30 DIFFUSE LARGE B-CELL LYMPHOMA, UNSPECIFIED BODY REGION (HCC): Primary | ICD-10-CM

## 2017-03-20 DIAGNOSIS — R91.1 LUNG NODULE: ICD-10-CM

## 2017-03-20 LAB
ALBUMIN SERPL-MCNC: 3.6 G/DL (ref 3.5–5.2)
ALBUMIN/GLOB SERPL: 1.3 G/DL (ref 1.1–2.4)
ALP SERPL-CCNC: 138 U/L (ref 38–116)
ALT SERPL W P-5'-P-CCNC: 19 U/L (ref 0–33)
ANION GAP SERPL CALCULATED.3IONS-SCNC: 14.6 MMOL/L
AST SERPL-CCNC: 221 U/L (ref 0–32)
BASOPHILS # BLD AUTO: 0.03 10*3/MM3 (ref 0–0.1)
BASOPHILS NFR BLD AUTO: 0.5 % (ref 0–1.1)
BILIRUB SERPL-MCNC: 0.4 MG/DL (ref 0.1–1.2)
BUN BLD-MCNC: 18 MG/DL (ref 6–20)
BUN/CREAT SERPL: 26.1 (ref 7.3–30)
CALCIUM SPEC-SCNC: 8.5 MG/DL (ref 8.5–10.2)
CHLORIDE SERPL-SCNC: 97 MMOL/L (ref 98–107)
CO2 SERPL-SCNC: 21.4 MMOL/L (ref 22–29)
CREAT BLD-MCNC: 0.69 MG/DL (ref 0.6–1.1)
DEPRECATED RDW RBC AUTO: 44.5 FL (ref 37–49)
EOSINOPHIL # BLD AUTO: 0.05 10*3/MM3 (ref 0–0.36)
EOSINOPHIL NFR BLD AUTO: 0.9 % (ref 1–5)
ERYTHROCYTE [DISTWIDTH] IN BLOOD BY AUTOMATED COUNT: 14.4 % (ref 11.7–14.5)
GFR SERPL CREATININE-BSD FRML MDRD: 81 ML/MIN/1.73
GLOBULIN UR ELPH-MCNC: 2.8 GM/DL (ref 1.8–3.5)
GLUCOSE BLD-MCNC: 127 MG/DL (ref 74–124)
HCT VFR BLD AUTO: 32.7 % (ref 34–45)
HGB BLD-MCNC: 10.4 G/DL (ref 11.5–14.9)
HOLD SPECIMEN: NORMAL
IMM GRANULOCYTES # BLD: 0.01 10*3/MM3 (ref 0–0.03)
IMM GRANULOCYTES NFR BLD: 0.2 % (ref 0–0.5)
LDH SERPL-CCNC: 1438 U/L (ref 99–259)
LYMPHOCYTES # BLD AUTO: 1.24 10*3/MM3 (ref 1–3.5)
LYMPHOCYTES NFR BLD AUTO: 21.5 % (ref 20–49)
MCH RBC QN AUTO: 27.2 PG (ref 27–33)
MCHC RBC AUTO-ENTMCNC: 31.8 G/DL (ref 32–35)
MCV RBC AUTO: 85.4 FL (ref 83–97)
MONOCYTES # BLD AUTO: 0.75 10*3/MM3 (ref 0.25–0.8)
MONOCYTES NFR BLD AUTO: 13 % (ref 4–12)
NEUTROPHILS # BLD AUTO: 3.69 10*3/MM3 (ref 1.5–7)
NEUTROPHILS NFR BLD AUTO: 63.9 % (ref 39–75)
NRBC BLD MANUAL-RTO: 0 /100 WBC (ref 0–0)
PLATELET # BLD AUTO: 118 10*3/MM3 (ref 150–375)
PMV BLD AUTO: 10.2 FL (ref 8.9–12.1)
POTASSIUM BLD-SCNC: 4.1 MMOL/L (ref 3.5–4.7)
PROT SERPL-MCNC: 6.4 G/DL (ref 6.3–8)
RBC # BLD AUTO: 3.83 10*6/MM3 (ref 3.9–5)
SODIUM BLD-SCNC: 133 MMOL/L (ref 134–145)
WBC NRBC COR # BLD: 5.77 10*3/MM3 (ref 4–10)

## 2017-03-20 PROCEDURE — 83615 LACTATE (LD) (LDH) ENZYME: CPT

## 2017-03-20 PROCEDURE — 85025 COMPLETE CBC W/AUTO DIFF WBC: CPT

## 2017-03-20 PROCEDURE — 80053 COMPREHEN METABOLIC PANEL: CPT

## 2017-03-20 PROCEDURE — 99214 OFFICE O/P EST MOD 30 MIN: CPT | Performed by: INTERNAL MEDICINE

## 2017-03-20 RX ORDER — ONDANSETRON 4 MG/1
4 TABLET, FILM COATED ORAL EVERY 6 HOURS PRN
Qty: 40 TABLET | Refills: 3 | Status: SHIPPED | OUTPATIENT
Start: 2017-03-20 | End: 2017-03-27

## 2017-03-20 RX ORDER — PROCHLORPERAZINE MALEATE 10 MG
10 TABLET ORAL EVERY 6 HOURS PRN
Qty: 30 TABLET | Refills: 2 | Status: SHIPPED | OUTPATIENT
Start: 2017-03-20

## 2017-03-20 RX ORDER — SODIUM CHLORIDE 0.9 % (FLUSH) 0.9 %
10 SYRINGE (ML) INJECTION AS NEEDED
Status: DISCONTINUED | OUTPATIENT
Start: 2017-03-20 | End: 2017-03-20 | Stop reason: HOSPADM

## 2017-03-20 RX ORDER — SODIUM CHLORIDE 0.9 % (FLUSH) 0.9 %
10 SYRINGE (ML) INJECTION AS NEEDED
Status: CANCELLED | OUTPATIENT
Start: 2017-03-20

## 2017-03-20 RX ADMIN — Medication 10 ML: at 13:17

## 2017-03-20 NOTE — PROGRESS NOTES
REASON FOR FOLLOW-UP:    1.  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  IPI 4 out of 5 = high risk.  2. Therapy with R-miniCHOP initiated on 5/5/2016, complete as of 8/19/2016.           Diffuse large B cell lymphoma    4/14/2016 Initial Diagnosis    Diffuse large B cell lymphoma by ultrasound-guided left supraclavicular lymph node biopsy.  Ki-67>95%.      4/28/2016 Imaging    PET scan:  There is fairly extensive lymphoma as described in detail  above. There are areas of intense metabolic activity localizing to the  spinal canal at the midthoracic level as well as low lumbar and sacral  levels. There is remarkably little detectable abnormality at these  levels by CT. MRI would be the best means for further delineation of  anatomic pathology. The degree of activity certainly could indicate an  underlying mass and cord compression should be evaluated. Furthermore,  there is noted a very small nodular focus of uptake within the upper  inner quadrant of the left breast which appears to correspond to tiny  nodular foci seen at concurrent CT imaging. The finding is nonspecific,  however should be closely followed after appropriate therapy for  lymphoma to ensure resolution and to exclude a possible breast  malignancy.      4/29/2016 Surgery    Mediport placed by Dr. Mihir Arrieta.      5/5/2016 - 8/19/2016 Chemotherapy    R-miniCHOP      9/9/2016 Imaging    PET scan:  There has been a dramatic interval improvement. There has been  resolution of all of the previously seen intensely hypermetabolic  lesions involving the thorax, spine, retroperitoneum, and pelvis.      There is a new focus of hypermetabolic activity at the mid sigmoid colon  where there is mild acute diverticulitis, image 284.         HISTORY OF PRESENT ILLNESS:  Guerita De La Fuente is a 83 y.o. female who returns today for follow up of the above issue.  She completed chemotherapy in August and had a  "PET scan on 9/9/2016 that showed a complete metabolic response to therapy.     I saw her on 12/9/2016 and she was doing very well.      A few weeks ago she started feeling unwell.  She has had worsening fatigue.  She has had nausea without vomiting.  She presented to her primary care and was encouraged to go to the emergency department due to concern for a pulmonary embolism.    She presented to the emergency department on 3/3/2017 with cough, shortness of breath, weakness, low-grade fevers, and some back pain.  A CT angiogram of the chest was performed on 3/3/2017.  No pulmonary embolism was noted.  Enlarging lymphadenopathy in the mediastinum was noted.  There was a 1 cm groundglass opacity in the central right upper lobe.    She continues to be fatigued.  She continues to have nausea without vomiting.  She denies any chest pain but does have some dyspnea on exertion.  She has not palpated any lymphadenopathy.  No neurologic symptoms.  No worsening pain.    PAST MEDICAL, SURGICAL, FAMILY, AND SOCIAL HISTORIES were reviewed with the patient and in the electronic medical record, and were updated if indicated.    ALLERGIES:  Allergies   Allergen Reactions   • Allopurinol    • Codeine GI Intolerance   • Levaquin [Levofloxacin]    • Shellfish-Derived Products Other (See Comments)     \"CRABS\" \"I CAN'T REMEMBER WHAT THE REACTION WAS\"   • Penicillins Rash   • Sulfa Antibiotics Rash       MEDICATIONS:  The medication list has been reviewed with the patient by the medical assistant, and the list has been updated in the electronic medical record, which I reviewed.  Medication dosages and frequencies were confirmed to be accurate.    REVIEW OF SYSTEMS:  PAIN:  See Vital Signs below.  GENERAL:  See HPI  SKIN: No rash  HEME/LYMPH:  No abnormal bleeding.  Lymphadenopathy resolved  EYES:  No vision changes or diplopia.  ENT:  No sore throat or difficulty swallowing.  RESPIRATORY:  No cough, shortness of breath, hemoptysis, or " "wheezing.  CARDIOVASCULAR:  No chest pain, palpitations, orthopnea, or dyspnea on exertion.  GASTROINTESTINAL:  See history of present illness  GENITOURINARY:  No dysuria or hematuria.  MUSCULOSKELETAL:  Occasional right hip and back pain.  NEUROLOGIC:  No neuropathy  PSYCHIATRIC:  No depression, anxiety, or mood changes.    Vitals:    03/20/17 1320   BP: 128/58   Pulse: 108   Resp: 16   Temp: 97.9 °F (36.6 °C)   TempSrc: Oral   SpO2: 96%   Weight: 140 lb (63.5 kg)   Height: 66\" (167.6 cm)   PainSc: 0-No pain       PHYSICAL EXAMINATION:  GENERAL:  Well-developed well-nourished female; awake, alert and oriented, in no acute distress.  SKIN:  No rash or nonhealing lesions  HEAD:  Normocephalic, atraumatic.  EYES:  Pupils equal, round and reactive to light.  Extraocular movements intact.  Conjunctivae normal.  EARS:  Hearing intact.  NOSE:  Septum midline.  No excoriations or nasal discharge.  MOUTH:  No stomatitis or ulcers.  Lips are normal.  No gingival erythema.  THROAT:  Oropharynx without lesions or exudates.  NECK:  Supple with good range of motion; no thyromegaly or masses; no JVD or bruits.  CHEST:  Lungs are clear to auscultation bilaterally.  No wheezes, rales, or rhonchi.  A Mediport is present in the right subclavian area that is benign in appearance.  HEART:  Tachycardic and regular.  No murmurs, gallops or rubs.  ABDOMEN:  Soft, non-tender, non-distended.  Normal active bowel sounds.  No organomegaly.  EXTREMITIES:  No clubbing, cyanosis, or edema.  NEUROLOGICAL:  No focal neurologic deficits.    DIAGNOSTIC DATA:  Lab Results   Component Value Date    WBC 5.77 03/20/2017    HGB 10.4 (L) 03/20/2017    HCT 32.7 (L) 03/20/2017    MCV 85.4 03/20/2017     (L) 03/20/2017     Imaging: CT imaging of the chest from 3/3/2017 personally reviewed.  No pulmonary embolism.  Enlarging lymphadenopathy in the mediastinum.  An AP window lymph node measures 1.7 cm compared 1.3 cm previously.  Left hilar lymph nodes " measuring up to 2 cm were noted, increasing in size.  A 1 cm groundglass opacity in the central right upper lobe noted.    ASSESSMENT:  This is a 83 y.o. female with:  1.  Stage IVb aggressive appearing diffuse large B-cell lymphoma.  IPI score is 4.  There is likely central nervous system involvement in the spinal canal.  She declined intrathecal therapy.  She completed 6 cycles of R-miniCHOP which she tolerated well.  She has been doing quite well.  Imaging had shown a complete metabolic response to therapy.  However, CT imaging on 3/3/2017 shows some evidence of progression.  She is symptomatic with fatigue and nausea.  She is developing cytopenias.  I'm concerned about disease relapse.  We will get a PET scan and I will see her back after that to review results.  If she is relapsing she has indicated that she may not be interested in any further therapy.  However, we do have a clinical trial that she may be eligible for comparing Gemzar plus Rituxan to Pixantrone plus Rituxan that she may be eligible for.  I will inquire with our clinical trial team.  2. 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  3.  Mediport which requires maintenance every 4-6 weeks.  This was flushed today.  4.  Nausea: I electronically sent a prescription for Zofran 4 mg every 6 hours as needed to her pharmacy.  She has Phenergan at home which she has not used.    PLAN:  1.  PET scan  2.  I will see her back as soon as possible after the PET scan to review results and to make further plans at that time.  3.  If she desires therapy I will inquire as to whether she is a candidate for our clinical trial as noted above.

## 2017-03-20 NOTE — TELEPHONE ENCOUNTER
----- Message from Stacey Barboza sent at 3/20/2017  2:44 PM EDT -----   [facility] to calling to get her medication changed.  The one called in for her yesterday, she is allergic to        [phone]      [facility] states pt is allergic to zofran. We do not have this listed as an allergy. I spoke with pt. She states she broke out in hives and was itching all over when she took zofran last. Informed Dr. Ramírez. Dr. Ramírez prescribed comapzine 10mg every 6 hours prn. New script sent to pharmacy. zofran added to pt's allergy list.

## 2017-03-22 ENCOUNTER — HOSPITAL ENCOUNTER (OUTPATIENT)
Dept: PET IMAGING | Facility: HOSPITAL | Age: 82
Discharge: HOME | End: 2017-03-22
Attending: INTERNAL MEDICINE

## 2017-03-22 ENCOUNTER — HOSPITAL ENCOUNTER (OUTPATIENT)
Dept: PET IMAGING | Facility: HOSPITAL | Age: 82
Discharge: HOME | End: 2017-03-22
Attending: INTERNAL MEDICINE | Admitting: INTERNAL MEDICINE

## 2017-03-22 DIAGNOSIS — C83.30 DIFFUSE LARGE B-CELL LYMPHOMA, UNSPECIFIED BODY REGION (HCC): ICD-10-CM

## 2017-03-22 DIAGNOSIS — R91.1 LUNG NODULE: ICD-10-CM

## 2017-03-22 PROCEDURE — 0 FLUDEOXYGLUCOSE F18 SOLUTION: Performed by: INTERNAL MEDICINE

## 2017-03-22 PROCEDURE — 78815 PET IMAGE W/CT SKULL-THIGH: CPT

## 2017-03-22 PROCEDURE — A9552 F18 FDG: HCPCS | Performed by: INTERNAL MEDICINE

## 2017-03-22 RX ADMIN — FLUDEOXYGLUCOSE F18 1 DOSE: 300 INJECTION INTRAVENOUS at 07:39

## 2017-03-24 ENCOUNTER — HOSPITAL ENCOUNTER (OUTPATIENT)
Dept: PET IMAGING | Facility: HOSPITAL | Age: 82
Discharge: HOME | End: 2017-03-24
Admitting: INTERNAL MEDICINE

## 2017-03-24 ENCOUNTER — TELEPHONE (OUTPATIENT)
Dept: ONCOLOGY | Facility: CLINIC | Age: 82
End: 2017-03-24

## 2017-03-24 DIAGNOSIS — C83.30 DIFFUSE LARGE B-CELL LYMPHOMA, UNSPECIFIED BODY REGION (HCC): Primary | ICD-10-CM

## 2017-03-24 LAB — CREAT BLDA-MCNC: 0.6 MG/DL (ref 0.6–1.3)

## 2017-03-24 PROCEDURE — 74178 CT ABD&PLV WO CNTR FLWD CNTR: CPT

## 2017-03-24 PROCEDURE — 71260 CT THORAX DX C+: CPT

## 2017-03-24 PROCEDURE — 0 DIATRIZOATE MEGLUMINE & SODIUM PER 1 ML: Performed by: INTERNAL MEDICINE

## 2017-03-24 PROCEDURE — 0 IOPAMIDOL 61 % SOLUTION: Performed by: INTERNAL MEDICINE

## 2017-03-24 PROCEDURE — 82565 ASSAY OF CREATININE: CPT

## 2017-03-24 RX ADMIN — DIATRIZOATE MEGLUMINE AND DIATRIZOATE SODIUM 30 ML: 660; 100 LIQUID ORAL; RECTAL at 12:08

## 2017-03-24 RX ADMIN — IOPAMIDOL 85 ML: 612 INJECTION, SOLUTION INTRAVENOUS at 12:50

## 2017-03-24 NOTE — TELEPHONE ENCOUNTER
I spoke with on the phone regarding her abnormal PET scan.  She has minimal respiratory symptoms at this point with only some dyspnea on exertion when going up stairs.  She has no abdominal pain.  She is feeling weak.  She is eating 3 meals per day.    I explained that there may be a blood clot in her liver.  A CT scan has been recommended.  I would also like to get a CT scan of her chest to evaluate the abnormalities on PET imaging.  There does not seem to be a large lymphoma burden at this point.    I have ordered a stat CT scan of the chest abdomen and pelvis.  I would like a stat reading on this and we will keep her here until we knows the result.  She may require anticoagulation.  We will hopefully be able to treat her outpatient but inpatient hospitalization might be required.    I have ordered a stat CT scan of the chest abdomen and pelvis.  If this cannot be done, we will obtain a duplex of her liver.

## 2017-03-27 ENCOUNTER — APPOINTMENT (OUTPATIENT)
Dept: LAB | Facility: HOSPITAL | Age: 82
End: 2017-03-27

## 2017-03-27 ENCOUNTER — OFFICE VISIT (OUTPATIENT)
Dept: ONCOLOGY | Facility: CLINIC | Age: 82
End: 2017-03-27

## 2017-03-27 VITALS
TEMPERATURE: 97.6 F | WEIGHT: 139.6 LBS | DIASTOLIC BLOOD PRESSURE: 58 MMHG | RESPIRATION RATE: 16 BRPM | OXYGEN SATURATION: 95 % | BODY MASS INDEX: 22.43 KG/M2 | HEART RATE: 99 BPM | SYSTOLIC BLOOD PRESSURE: 124 MMHG | HEIGHT: 66 IN

## 2017-03-27 DIAGNOSIS — C83.30 DIFFUSE LARGE B-CELL LYMPHOMA, UNSPECIFIED BODY REGION (HCC): Primary | ICD-10-CM

## 2017-03-27 PROCEDURE — 99214 OFFICE O/P EST MOD 30 MIN: CPT | Performed by: INTERNAL MEDICINE

## 2017-03-27 PROCEDURE — G0463 HOSPITAL OUTPT CLINIC VISIT: HCPCS | Performed by: INTERNAL MEDICINE

## 2017-03-27 RX ORDER — LORATADINE 10 MG/1
10 TABLET ORAL DAILY
COMMUNITY

## 2017-03-27 RX ORDER — PREDNISONE 10 MG/1
10 TABLET ORAL DAILY
Qty: 30 TABLET | Refills: 5 | Status: SHIPPED | OUTPATIENT
Start: 2017-03-27 | End: 2017-04-12 | Stop reason: SDUPTHER

## 2017-03-27 NOTE — PROGRESS NOTES
REASON FOR FOLLOW-UP:    1.  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  IPI 4 out of 5 = high risk.  2. Therapy with R-miniCHOP initiated on 5/5/2016, complete as of 8/19/2016.           Diffuse large B cell lymphoma    4/14/2016 Initial Diagnosis    Diffuse large B cell lymphoma by ultrasound-guided left supraclavicular lymph node biopsy.  Ki-67>95%.      4/28/2016 Imaging    PET scan:  There is fairly extensive lymphoma as described in detail  above. There are areas of intense metabolic activity localizing to the  spinal canal at the midthoracic level as well as low lumbar and sacral  levels. There is remarkably little detectable abnormality at these  levels by CT. MRI would be the best means for further delineation of  anatomic pathology. The degree of activity certainly could indicate an  underlying mass and cord compression should be evaluated. Furthermore,  there is noted a very small nodular focus of uptake within the upper  inner quadrant of the left breast which appears to correspond to tiny  nodular foci seen at concurrent CT imaging. The finding is nonspecific,  however should be closely followed after appropriate therapy for  lymphoma to ensure resolution and to exclude a possible breast  malignancy.      4/29/2016 Surgery    Mediport placed by Dr. Mihir Arrieta.      5/5/2016 - 8/19/2016 Chemotherapy    R-miniCHOP      9/9/2016 Imaging    PET scan:  There has been a dramatic interval improvement. There has been  resolution of all of the previously seen intensely hypermetabolic  lesions involving the thorax, spine, retroperitoneum, and pelvis.      There is a new focus of hypermetabolic activity at the mid sigmoid colon  where there is mild acute diverticulitis, image 284.      3/22/2017 Imaging    IMPRESSION:  1. The tiny hypermetabolic nodes at the left axilla and left internal  mammary chain are similar to the hypermetabolic nodes seen on the  PET/CT  from 04/28/2016. This suggests recurrence. The new hypermetabolic focus  within the left T2 lamina is concerning for a metastasis as well.  2. There is unusual hypermetabolic activity within the central aspect of  the right hepatic lobe corresponds to a much larger right portal vein  than on previous examinations. This suggests right portal vein  thrombosis. Further evaluation with contrast-enhanced CT of the abdomen  is recommended.  3. The symmetrical hypermetabolic activity throughout both lung fields  is of uncertain etiology as no airspace consolidations are present on  the corresponding CT. Extensive pneumonitis may have diffuse lung  activity, but the appearance on CT is not suggestive of pneumonitis. The  etiology of this finding is uncertain.      3/24/2017 Imaging    IMPRESSION:  Mildly enlarged bilateral axillary and mediastinal and left  internal mammary chain lymph nodes that showed mild hypermetabolism on a  recent PET/CT study consistent with recurrent lymphoma. No lung  abnormalities are identified to account for the low level abnormal  uptake noted on the PET/CT study. There is also no evidence of a lytic  or blastic lesion in the left lamina of T2 were the PET/CT study showed  focal increased activity. This does not exclude metastatic disease in  this location. Images of the abdomen and pelvis show no evidence of  recurrent lymphoma. There is no evidence of portal vein thrombosis.         HISTORY OF PRESENT ILLNESS:  Guerita De La Fuente is a 83 y.o. female who returns today for follow up of the above issue.  She completed chemotherapy in August and had a PET scan on 9/9/2016 that showed a complete metabolic response to therapy.     I saw her on 12/9/2016 and she was doing very well.      A few weeks ago she started feeling unwell.  She has had worsening fatigue.  She has had nausea without vomiting.  She presented to her primary care and was encouraged to go to the emergency department due to  "concern for a pulmonary embolism.    She presented to the emergency department on 3/3/2017 with cough, shortness of breath, weakness, low-grade fevers, and some back pain.  A CT angiogram of the chest was performed on 3/3/2017.  No pulmonary embolism was noted.  Enlarging lymphadenopathy in the mediastinum was noted.  There was a 1 cm groundglass opacity in the central right upper lobe.    I saw her on 3/20/2017.  Based on her abnormal imaging and symptoms, we obtained a PET scan.  Results of this are noted below.  There is diffuse abnormality in the lung which does not correlate with the CT finding.  There is also an abnormality in the liver was thought to represent possibly a portal vein thrombosis.  There are a few enlarged metabolically active lymph nodes suspected to represent recurrence of lymphoma.    Based on this imaging, CT imaging of the chest abdomen and pelvis was performed.  Results are noted below.  The lungs appear normal.  The abnormality in liver is not evident.    She continues to complain of nausea but this seems to be a little bit better after cutting out sugar in her coffee.  She was using 10 mg of Compazine which was sedating.  She remains fatigued.  She has a little bit of dizziness but has not fallen and denies any syncope.  She remains very weak.  No focal weakness however.  She denies any abdominal pain.      PAST MEDICAL, SURGICAL, FAMILY, AND SOCIAL HISTORIES were reviewed with the patient and in the electronic medical record, and were updated if indicated.    ALLERGIES:  Allergies   Allergen Reactions   • Allopurinol    • Codeine GI Intolerance   • Levaquin [Levofloxacin]    • Shellfish-Derived Products Other (See Comments)     \"CRABS\" \"I CAN'T REMEMBER WHAT THE REACTION WAS\"   • Zofran [Ondansetron Hcl] Hives   • Penicillins Rash   • Sulfa Antibiotics Rash       MEDICATIONS:  The medication list has been reviewed with the patient by the medical assistant, and the list has been updated " "in the electronic medical record, which I reviewed.  Medication dosages and frequencies were confirmed to be accurate.    REVIEW OF SYSTEMS:  PAIN:  See Vital Signs below.  GENERAL:  See HPI  SKIN: No rash  HEME/LYMPH:  No abnormal bleeding.  Lymphadenopathy resolved  EYES:  No vision changes or diplopia.  ENT:  No sore throat or difficulty swallowing.  RESPIRATORY:  No cough, shortness of breath, hemoptysis, or wheezing.  CARDIOVASCULAR:  No chest pain, palpitations, orthopnea, or dyspnea on exertion.  GASTROINTESTINAL:  See history of present illness  GENITOURINARY:  No dysuria or hematuria.  MUSCULOSKELETAL:  Occasional right hip and back pain.  NEUROLOGIC:  No neuropathy.  History of present illness.  PSYCHIATRIC:  No depression, anxiety, or mood changes.    Vitals:    03/27/17 1032   BP: 124/58   Pulse: 99   Resp: 16   Temp: 97.6 °F (36.4 °C)   TempSrc: Oral   SpO2: 95%   Weight: 139 lb 9.6 oz (63.3 kg)   Height: 66\" (167.6 cm)   PainSc: 0-No pain       PHYSICAL EXAMINATION:  GENERAL:  Well-developed well-nourished female; awake, alert and oriented, in no acute distress.  SKIN:  No rash or nonhealing lesions  HEAD:  Normocephalic, atraumatic.  EYES:  Pupils equal, round and reactive to light.  Extraocular movements intact.  Conjunctivae normal.  EARS:  Hearing intact.  NOSE:  Septum midline.  No excoriations or nasal discharge.  MOUTH:  No stomatitis or ulcers.  Lips are normal.  No gingival erythema.  THROAT:  Oropharynx without lesions or exudates.  NECK:  Supple with good range of motion; no thyromegaly or masses; no JVD or bruits.  CHEST:  Lungs are clear to auscultation bilaterally.  No wheezes, rales, or rhonchi.  A Mediport is present in the right subclavian area that is benign in appearance.  HEART:  Tachycardic and regular.  No murmurs, gallops or rubs.  ABDOMEN:  Soft, non-tender, non-distended.  Normal active bowel sounds.  No organomegaly.  EXTREMITIES:  No clubbing, cyanosis, or " edema.  NEUROLOGICAL:  No focal neurologic deficits.    DIAGNOSTIC DATA:  Lab Results   Component Value Date    WBC 5.77 03/20/2017    HGB 10.4 (L) 03/20/2017    HCT 32.7 (L) 03/20/2017    MCV 85.4 03/20/2017     (L) 03/20/2017     Imaging: PET scan images and CT images from last week were reviewed.  Diffuse uptake in the lungs on the PET.  Hypermetabolic area in the liver on the PET scan.  Scattered lymphadenopathy otherwise.  No abnormalities on CT imaging in the lungs or liver.    ASSESSMENT:  This is a 83 y.o. female with:  1.  Stage IVb aggressive appearing diffuse large B-cell lymphoma.  IPI score is 4.  There is likely central nervous system involvement in the spinal canal.  She declined intrathecal therapy.  She completed 6 cycles of R-miniCHOP which she tolerated well.  She has been doing quite well.  Imaging had shown a complete metabolic response to therapy.  However, CT imaging on 3/3/2017 shows some evidence of progression.  PET scan confirms likely relapse in several lymph nodes.  There is an abnormality in the liver which is unusual without a CT correlate.  PET scan also shows uptake in the lungs of uncertain significance, again without a CT correlate.  She is not that symptomatic from a respiratory standpoint.  Her LDH is quite elevated.  Liver labs are elevated.  She is nauseated and quite fatigued.  I would like to obtain an MRI of her liver as well as an MRI of her brain for further evaluation.  Perhaps the abnormality on PET imaging in the liver will be evidence on the MRI.  There is no evidence for a portal vein thrombosis on CT imaging according to the radiologist.  Because of her nausea concern for central nervous system involvement with her lymphoma before we initiated therapy, I will obtain an MRI of her brain.  Overall, I am very concerned about the possibility of relapse of her lymphoma possibly in the liver as well.  Central nervous system involvement is certainly possible.  If she  wants further treatment, will discuss options with her at her next visit.  2. 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  3.  Mediport which requires maintenance every 4-6 weeks.  4.  Nausea: She states an allergy to Zofran which causes a rash.  She had a rash due to allopurinol but not one due to Zofran than I recall.  Nonetheless, her nausea is better and she has Compazine to use at home.  She can certainly use half of the Compazine if a whole tablet makes her sedated.  Again, we will obtain an MRI of her brain due to the nausea as well.  5.  Global weakness: There is nothing focal.  I suspect this is due to relapse of her lymphoma.  I sent a prescription for 10 mg of prednisone daily to her pharmacy.    PLAN:  1.  MRI of the liver  2.  MRI of the brain  3.  I will see her back soon after that imaging is done to review results.  We will recheck labs that day.  4.  Prednisone 10 mg daily.  Prescription electronically sent to her pharmacy.  5.  Continue Compazine as needed.  She can use 5 mg instead 10 mg.

## 2017-03-28 ENCOUNTER — HOSPITAL ENCOUNTER (OUTPATIENT)
Dept: MRI IMAGING | Facility: HOSPITAL | Age: 82
Discharge: HOME | End: 2017-03-28
Attending: INTERNAL MEDICINE

## 2017-03-28 ENCOUNTER — HOSPITAL ENCOUNTER (OUTPATIENT)
Dept: MRI IMAGING | Facility: HOSPITAL | Age: 82
Discharge: HOME | End: 2017-03-28
Attending: INTERNAL MEDICINE | Admitting: INTERNAL MEDICINE

## 2017-03-28 DIAGNOSIS — C83.30 DIFFUSE LARGE B-CELL LYMPHOMA, UNSPECIFIED BODY REGION (HCC): ICD-10-CM

## 2017-03-28 LAB — CREAT BLDA-MCNC: 0.5 MG/DL (ref 0.6–1.3)

## 2017-03-28 PROCEDURE — 70553 MRI BRAIN STEM W/O & W/DYE: CPT

## 2017-03-28 PROCEDURE — A9577 INJ MULTIHANCE: HCPCS | Performed by: INTERNAL MEDICINE

## 2017-03-28 PROCEDURE — 0 GADOBENATE DIMEGLUMINE 529 MG/ML SOLUTION: Performed by: INTERNAL MEDICINE

## 2017-03-28 PROCEDURE — 82565 ASSAY OF CREATININE: CPT

## 2017-03-28 PROCEDURE — 74183 MRI ABD W/O CNTR FLWD CNTR: CPT

## 2017-03-28 RX ADMIN — GADOBENATE DIMEGLUMINE 12 ML: 529 INJECTION, SOLUTION INTRAVENOUS at 09:15

## 2017-03-30 ENCOUNTER — LAB (OUTPATIENT)
Dept: LAB | Facility: HOSPITAL | Age: 82
End: 2017-03-30

## 2017-03-30 ENCOUNTER — OFFICE VISIT (OUTPATIENT)
Dept: ONCOLOGY | Facility: CLINIC | Age: 82
End: 2017-03-30

## 2017-03-30 VITALS
HEART RATE: 98 BPM | OXYGEN SATURATION: 94 % | BODY MASS INDEX: 22.6 KG/M2 | DIASTOLIC BLOOD PRESSURE: 62 MMHG | TEMPERATURE: 97.6 F | RESPIRATION RATE: 18 BRPM | WEIGHT: 140.6 LBS | SYSTOLIC BLOOD PRESSURE: 122 MMHG | HEIGHT: 66 IN

## 2017-03-30 DIAGNOSIS — C83.30 DIFFUSE LARGE B-CELL LYMPHOMA, UNSPECIFIED BODY REGION (HCC): ICD-10-CM

## 2017-03-30 LAB
ALBUMIN SERPL-MCNC: 3.4 G/DL (ref 3.5–5.2)
ALBUMIN/GLOB SERPL: 1.1 G/DL (ref 1.1–2.4)
ALP SERPL-CCNC: 191 U/L (ref 38–116)
ALT SERPL W P-5'-P-CCNC: 37 U/L (ref 0–33)
ANION GAP SERPL CALCULATED.3IONS-SCNC: 16.1 MMOL/L
AST SERPL-CCNC: 313 U/L (ref 0–32)
BASOPHILS # BLD AUTO: 0.04 10*3/MM3 (ref 0–0.1)
BASOPHILS NFR BLD AUTO: 0.7 % (ref 0–1.1)
BILIRUB SERPL-MCNC: 0.7 MG/DL (ref 0.1–1.2)
BUN BLD-MCNC: 18 MG/DL (ref 6–20)
BUN/CREAT SERPL: 27.3 (ref 7.3–30)
CALCIUM SPEC-SCNC: 8.9 MG/DL (ref 8.5–10.2)
CHLORIDE SERPL-SCNC: 94 MMOL/L (ref 98–107)
CO2 SERPL-SCNC: 21.9 MMOL/L (ref 22–29)
CREAT BLD-MCNC: 0.66 MG/DL (ref 0.6–1.1)
DEPRECATED RDW RBC AUTO: 46.5 FL (ref 37–49)
EOSINOPHIL # BLD AUTO: 0.07 10*3/MM3 (ref 0–0.36)
EOSINOPHIL NFR BLD AUTO: 1.2 % (ref 1–5)
ERYTHROCYTE [DISTWIDTH] IN BLOOD BY AUTOMATED COUNT: 15.2 % (ref 11.7–14.5)
GFR SERPL CREATININE-BSD FRML MDRD: 86 ML/MIN/1.73
GLOBULIN UR ELPH-MCNC: 3 GM/DL (ref 1.8–3.5)
GLUCOSE BLD-MCNC: 100 MG/DL (ref 74–124)
HCT VFR BLD AUTO: 33.3 % (ref 34–45)
HGB BLD-MCNC: 10.9 G/DL (ref 11.5–14.9)
IMM GRANULOCYTES # BLD: 0.04 10*3/MM3 (ref 0–0.03)
IMM GRANULOCYTES NFR BLD: 0.7 % (ref 0–0.5)
LDH SERPL-CCNC: 2124 U/L (ref 99–259)
LYMPHOCYTES # BLD AUTO: 1.15 10*3/MM3 (ref 1–3.5)
LYMPHOCYTES NFR BLD AUTO: 20.4 % (ref 20–49)
MCH RBC QN AUTO: 27.6 PG (ref 27–33)
MCHC RBC AUTO-ENTMCNC: 32.7 G/DL (ref 32–35)
MCV RBC AUTO: 84.3 FL (ref 83–97)
MONOCYTES # BLD AUTO: 0.77 10*3/MM3 (ref 0.25–0.8)
MONOCYTES NFR BLD AUTO: 13.7 % (ref 4–12)
NEUTROPHILS # BLD AUTO: 3.56 10*3/MM3 (ref 1.5–7)
NEUTROPHILS NFR BLD AUTO: 63.3 % (ref 39–75)
NRBC BLD MANUAL-RTO: 0 /100 WBC (ref 0–0)
PLATELET # BLD AUTO: 119 10*3/MM3 (ref 150–375)
PMV BLD AUTO: 10.8 FL (ref 8.9–12.1)
POTASSIUM BLD-SCNC: 4.5 MMOL/L (ref 3.5–4.7)
PROT SERPL-MCNC: 6.4 G/DL (ref 6.3–8)
RBC # BLD AUTO: 3.95 10*6/MM3 (ref 3.9–5)
SODIUM BLD-SCNC: 132 MMOL/L (ref 134–145)
WBC NRBC COR # BLD: 5.63 10*3/MM3 (ref 4–10)

## 2017-03-30 PROCEDURE — 83615 LACTATE (LD) (LDH) ENZYME: CPT | Performed by: INTERNAL MEDICINE

## 2017-03-30 PROCEDURE — 36415 COLL VENOUS BLD VENIPUNCTURE: CPT | Performed by: INTERNAL MEDICINE

## 2017-03-30 PROCEDURE — 99215 OFFICE O/P EST HI 40 MIN: CPT | Performed by: INTERNAL MEDICINE

## 2017-03-30 PROCEDURE — 80053 COMPREHEN METABOLIC PANEL: CPT | Performed by: INTERNAL MEDICINE

## 2017-03-30 PROCEDURE — 85025 COMPLETE CBC W/AUTO DIFF WBC: CPT | Performed by: INTERNAL MEDICINE

## 2017-03-30 RX ORDER — DIPHENHYDRAMINE HYDROCHLORIDE 50 MG/ML
50 INJECTION INTRAMUSCULAR; INTRAVENOUS AS NEEDED
Status: CANCELLED | OUTPATIENT
Start: 2017-04-11

## 2017-03-30 RX ORDER — DIPHENHYDRAMINE HYDROCHLORIDE 50 MG/ML
50 INJECTION INTRAMUSCULAR; INTRAVENOUS AS NEEDED
Status: CANCELLED | OUTPATIENT
Start: 2017-04-25

## 2017-03-30 RX ORDER — ACETAMINOPHEN 325 MG/1
650 TABLET ORAL ONCE
Status: CANCELLED | OUTPATIENT
Start: 2017-04-25

## 2017-03-30 RX ORDER — FAMOTIDINE 10 MG/ML
20 INJECTION, SOLUTION INTRAVENOUS AS NEEDED
Status: CANCELLED | OUTPATIENT
Start: 2017-04-03

## 2017-03-30 RX ORDER — SODIUM CHLORIDE 9 MG/ML
250 INJECTION, SOLUTION INTRAVENOUS ONCE
Status: CANCELLED | OUTPATIENT
Start: 2017-04-25

## 2017-03-30 RX ORDER — FAMOTIDINE 10 MG/ML
20 INJECTION, SOLUTION INTRAVENOUS AS NEEDED
Status: CANCELLED | OUTPATIENT
Start: 2017-04-25

## 2017-03-30 RX ORDER — SODIUM CHLORIDE 9 MG/ML
250 INJECTION, SOLUTION INTRAVENOUS ONCE
Status: CANCELLED | OUTPATIENT
Start: 2017-04-18

## 2017-03-30 RX ORDER — MEPERIDINE HYDROCHLORIDE 50 MG/ML
25 INJECTION INTRAMUSCULAR; INTRAVENOUS; SUBCUTANEOUS
Status: CANCELLED | OUTPATIENT
Start: 2017-04-11

## 2017-03-30 RX ORDER — ACETAMINOPHEN 325 MG/1
650 TABLET ORAL ONCE
Status: CANCELLED | OUTPATIENT
Start: 2017-04-11

## 2017-03-30 RX ORDER — SODIUM CHLORIDE 9 MG/ML
250 INJECTION, SOLUTION INTRAVENOUS ONCE
Status: CANCELLED | OUTPATIENT
Start: 2017-04-03

## 2017-03-30 RX ORDER — SODIUM CHLORIDE 9 MG/ML
250 INJECTION, SOLUTION INTRAVENOUS ONCE
Status: CANCELLED | OUTPATIENT
Start: 2017-04-11

## 2017-03-30 RX ORDER — MEPERIDINE HYDROCHLORIDE 50 MG/ML
25 INJECTION INTRAMUSCULAR; INTRAVENOUS; SUBCUTANEOUS
Status: CANCELLED | OUTPATIENT
Start: 2017-04-18

## 2017-03-30 RX ORDER — MEPERIDINE HYDROCHLORIDE 50 MG/ML
25 INJECTION INTRAMUSCULAR; INTRAVENOUS; SUBCUTANEOUS
Status: CANCELLED | OUTPATIENT
Start: 2017-04-03

## 2017-03-30 RX ORDER — ACETAMINOPHEN 325 MG/1
650 TABLET ORAL ONCE
Status: CANCELLED | OUTPATIENT
Start: 2017-04-18

## 2017-03-30 RX ORDER — ACETAMINOPHEN 325 MG/1
650 TABLET ORAL ONCE
Status: CANCELLED | OUTPATIENT
Start: 2017-04-03

## 2017-03-30 RX ORDER — DIPHENHYDRAMINE HYDROCHLORIDE 50 MG/ML
50 INJECTION INTRAMUSCULAR; INTRAVENOUS AS NEEDED
Status: CANCELLED | OUTPATIENT
Start: 2017-04-03

## 2017-03-30 RX ORDER — FAMOTIDINE 10 MG/ML
20 INJECTION, SOLUTION INTRAVENOUS AS NEEDED
Status: CANCELLED | OUTPATIENT
Start: 2017-04-11

## 2017-03-30 RX ORDER — DIPHENHYDRAMINE HYDROCHLORIDE 50 MG/ML
50 INJECTION INTRAMUSCULAR; INTRAVENOUS AS NEEDED
Status: CANCELLED | OUTPATIENT
Start: 2017-04-18

## 2017-03-30 RX ORDER — MEPERIDINE HYDROCHLORIDE 50 MG/ML
25 INJECTION INTRAMUSCULAR; INTRAVENOUS; SUBCUTANEOUS
Status: CANCELLED | OUTPATIENT
Start: 2017-04-25

## 2017-03-30 RX ORDER — FAMOTIDINE 10 MG/ML
20 INJECTION, SOLUTION INTRAVENOUS AS NEEDED
Status: CANCELLED | OUTPATIENT
Start: 2017-04-18

## 2017-03-30 NOTE — PROGRESS NOTES
REASON FOR FOLLOW-UP:    1.  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  IPI 4 out of 5 = high risk.  2. Therapy with R-miniCHOP initiated on 5/5/2016, complete as of 8/19/2016.           Diffuse large B cell lymphoma    4/14/2016 Initial Diagnosis    Diffuse large B cell lymphoma by ultrasound-guided left supraclavicular lymph node biopsy.  Ki-67>95%.      4/28/2016 Imaging    PET scan:  There is fairly extensive lymphoma as described in detail  above. There are areas of intense metabolic activity localizing to the  spinal canal at the midthoracic level as well as low lumbar and sacral  levels. There is remarkably little detectable abnormality at these  levels by CT. MRI would be the best means for further delineation of  anatomic pathology. The degree of activity certainly could indicate an  underlying mass and cord compression should be evaluated. Furthermore,  there is noted a very small nodular focus of uptake within the upper  inner quadrant of the left breast which appears to correspond to tiny  nodular foci seen at concurrent CT imaging. The finding is nonspecific,  however should be closely followed after appropriate therapy for  lymphoma to ensure resolution and to exclude a possible breast  malignancy.      4/29/2016 Surgery    Mediport placed by Dr. Mihir Arrieta.      5/5/2016 - 8/19/2016 Chemotherapy    R-miniCHOP      9/9/2016 Imaging    PET scan:  There has been a dramatic interval improvement. There has been  resolution of all of the previously seen intensely hypermetabolic  lesions involving the thorax, spine, retroperitoneum, and pelvis.      There is a new focus of hypermetabolic activity at the mid sigmoid colon  where there is mild acute diverticulitis, image 284.      3/22/2017 Imaging    IMPRESSION:  1. The tiny hypermetabolic nodes at the left axilla and left internal  mammary chain are similar to the hypermetabolic nodes seen on the  PET/CT  from 04/28/2016. This suggests recurrence. The new hypermetabolic focus  within the left T2 lamina is concerning for a metastasis as well.  2. There is unusual hypermetabolic activity within the central aspect of  the right hepatic lobe corresponds to a much larger right portal vein  than on previous examinations. This suggests right portal vein  thrombosis. Further evaluation with contrast-enhanced CT of the abdomen  is recommended.  3. The symmetrical hypermetabolic activity throughout both lung fields  is of uncertain etiology as no airspace consolidations are present on  the corresponding CT. Extensive pneumonitis may have diffuse lung  activity, but the appearance on CT is not suggestive of pneumonitis. The  etiology of this finding is uncertain.      3/24/2017 Imaging    IMPRESSION:  Mildly enlarged bilateral axillary and mediastinal and left  internal mammary chain lymph nodes that showed mild hypermetabolism on a  recent PET/CT study consistent with recurrent lymphoma. No lung  abnormalities are identified to account for the low level abnormal  uptake noted on the PET/CT study. There is also no evidence of a lytic  or blastic lesion in the left lamina of T2 were the PET/CT study showed  focal increased activity. This does not exclude metastatic disease in  this location. Images of the abdomen and pelvis show no evidence of  recurrent lymphoma. There is no evidence of portal vein thrombosis.         HISTORY OF PRESENT ILLNESS:  Guerita De La Fuente is a 83 y.o. female who returns today for follow up of the above issue.  She completed chemotherapy in August and had a PET scan on 9/9/2016 that showed a complete metabolic response to therapy.     I saw her on 12/9/2016 and she was doing very well.      A few weeks ago she started feeling unwell.  She has had worsening fatigue.  She has had nausea without vomiting.  She presented to her primary care and was encouraged to go to the emergency department due to  "concern for a pulmonary embolism.    She presented to the emergency department on 3/3/2017 with cough, shortness of breath, weakness, low-grade fevers, and some back pain.  A CT angiogram of the chest was performed on 3/3/2017.  No pulmonary embolism was noted.  Enlarging lymphadenopathy in the mediastinum was noted.  There was a 1 cm groundglass opacity in the central right upper lobe.    I saw her on 3/20/2017.  Based on her abnormal imaging and symptoms, we obtained a PET scan.  Results of this are noted below.  There is diffuse abnormality in the lung which does not correlate with the CT finding.  There is also an abnormality in the liver was thought to represent possibly a portal vein thrombosis.  There are a few enlarged metabolically active lymph nodes suspected to represent recurrence of lymphoma.    Based on this imaging, CT imaging of the chest abdomen and pelvis was performed.  Results did not show any evidence for a portal vein thrombosis.  The lungs appear normal.  The abnormality in liver is not evident.    She was seen on 3/27/2017.  At that time an MRI of the brain was ordered and an MRI of the liver was requested.  Prednisone 10 mg daily was initiated.  She declined Zofran as she believes that she had a rash with this in the past.  Our records indicate the rash was secondary to allopurinol and Zofran.    She returns today to review imaging.  She remains very weak.  She has no appetite.  Anti-emetics make her sedated.  She has some dyspnea on exertion but believes this is secondary to generalized weakness.      PAST MEDICAL, SURGICAL, FAMILY, AND SOCIAL HISTORIES were reviewed with the patient and in the electronic medical record, and were updated if indicated.    ALLERGIES:  Allergies   Allergen Reactions   • Allopurinol    • Codeine GI Intolerance   • Levaquin [Levofloxacin]    • Shellfish-Derived Products Other (See Comments)     \"CRABS\" \"I CAN'T REMEMBER WHAT THE REACTION WAS\"   • Zofran " "[Ondansetron Hcl] Hives   • Penicillins Rash   • Sulfa Antibiotics Rash       MEDICATIONS:  The medication list has been reviewed with the patient by the medical assistant, and the list has been updated in the electronic medical record, which I reviewed.  Medication dosages and frequencies were confirmed to be accurate.    REVIEW OF SYSTEMS:  PAIN:  See Vital Signs below.  GENERAL:  See HPI  SKIN: No rash  HEME/LYMPH:  No abnormal bleeding.    EYES:  No vision changes or diplopia.  ENT:  No sore throat or difficulty swallowing.  RESPIRATORY:  No cough, shortness of breath, hemoptysis, or wheezing.  CARDIOVASCULAR:  No chest pain, palpitations, orthopnea.  Mild dyspnea on exertion.  GASTROINTESTINAL:  See history of present illness  GENITOURINARY:  No dysuria or hematuria.  MUSCULOSKELETAL:  Occasional right hip and back pain.  NEUROLOGIC:  No neuropathy.    PSYCHIATRIC:  No depression, anxiety, or mood changes.    Vitals:    03/30/17 0812   BP: 122/62   Pulse: 98   Resp: 18   Temp: 97.6 °F (36.4 °C)   SpO2: 94%   Weight: 140 lb 9.6 oz (63.8 kg)   Height: 65.5\" (166.4 cm)   PainSc: 0-No pain   ECOG 2-3    PHYSICAL EXAMINATION:  GENERAL:  Well-developed well-nourished female; awake, alert and oriented, in no acute distress.  SKIN:  No rash or nonhealing lesions  HEAD:  Normocephalic, atraumatic.  EYES:  Pupils equal, round and reactive to light.  Extraocular movements intact.  Conjunctivae normal.  EARS:  Hearing intact.  NOSE:  Septum midline.  No excoriations or nasal discharge.  MOUTH:  No stomatitis or ulcers.  Lips are normal.  No gingival erythema.  THROAT:  Oropharynx without lesions or exudates.  NECK:  Supple with good range of motion; no thyromegaly or masses; no JVD or bruits.  CHEST:  Lungs are clear to auscultation bilaterally.  No wheezes, rales, or rhonchi.  A Mediport is present in the right subclavian area that is benign in appearance.  HEART:  Regular rate and rhythm.  No murmurs, gallops or " rubs.  ABDOMEN:  Soft, non-tender, non-distended.  Normal active bowel sounds.  No organomegaly.  EXTREMITIES:  No clubbing, cyanosis, or edema.  NEUROLOGICAL:  No focal neurologic deficits.    DIAGNOSTIC DATA:  Lab Results   Component Value Date    WBC 5.63 03/30/2017    HGB 10.9 (L) 03/30/2017    HCT 33.3 (L) 03/30/2017    MCV 84.3 03/30/2017     (L) 03/30/2017     Imaging: PET scan images and CT images from last week were reviewed.  Diffuse uptake in the lungs on the PET.  Hypermetabolic area in the liver on the PET scan.  Scattered lymphadenopathy otherwise.  No abnormalities on CT imaging in the lungs or liver.    MRI brain from 3/28/2017 images personally reviewed.  No abnormalities.  MRCP imaging from 3/20/2017 personally reviewed.  I do not see anything obvious.  Radiology interpretation is pending.    ASSESSMENT:  This is a 83 y.o. female with:  1.  Stage IVb aggressive appearing diffuse large B-cell lymphoma.  IPI score was 4.  There was likely central nervous system involvement in the spinal canal.  She declined intrathecal therapy.  She completed 6 cycles of therapy with miniCHOP with a complete response.  On review of her chemotherapy regimen from 2016 it actually does not appear that she received Rituxan with her chemotherapy.  We are investigating this.  This was disclosed to the patient and her daughters today.    We await MRCP results as there is an FDG-avid area in the liver that did not show up on CT imaging.  I think we have enough evidence at this point to presume that her lymphoma has recurred.  We discussed potential therapeutic options.  We discussed initiating bendamustine and Rituxan or perhaps weekly Rituxan alone at this point as her performance status is marginal and declining at this point.  Together, we elected to proceed with weekly Rituxan ×4 to see if we can get her feeling better.  If her performance status improves then we would plan to proceed with bendamustine and  Rituxan.  This therapy is palliative at this point.  She understands this.    Of note, hepatitis B testing was done last year which was negative.    2. 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  3.  Mediport which requires maintenance every 4-6 weeks.  4.  Nausea: She states an allergy to Zofran which causes a rash.  She had a rash due to allopurinol but not one due to Zofran than I recall.  She is taking 5 mg of Compazine which she states is sedating.  She also has Phenergan to use at home.  We will increase steroids as noted below.  5.  Global weakness: There is nothing focal.  I suspect this is due to relapse of her lymphoma.  3 days ago we initiated prednisone 10 mg daily.  We will increase this to 20 mg daily today.    PLAN:  1.  Follow-up the MRI of the liver results.  2.  Increase prednisone to 20 mg daily.  3.  We will start Rituxan weekly for 4 weeks.  If she improves after that we will plan for bendamustine and Rituxan.  4.  We will see how she does weekly for the next 4 weeks.  If she improves in her labs improve we will plan to initiate bendamustine and Rituxan at that time.    Addendum: I spoke with Dr. Little with radiology regarding the MRCP.  He states that this appears normal.  I also spoke with him regarding the PET scan results.

## 2017-03-31 RX ORDER — FAMOTIDINE 10 MG/ML
20 INJECTION, SOLUTION INTRAVENOUS ONCE
Status: CANCELLED | OUTPATIENT
Start: 2017-04-11

## 2017-04-03 ENCOUNTER — LAB (OUTPATIENT)
Dept: ONCOLOGY | Facility: HOSPITAL | Age: 82
End: 2017-04-03

## 2017-04-03 ENCOUNTER — INFUSION (OUTPATIENT)
Dept: ONCOLOGY | Facility: HOSPITAL | Age: 82
End: 2017-04-03

## 2017-04-03 ENCOUNTER — OFFICE VISIT (OUTPATIENT)
Dept: ONCOLOGY | Facility: CLINIC | Age: 82
End: 2017-04-03

## 2017-04-03 VITALS — TEMPERATURE: 98.1 F | HEART RATE: 103 BPM | DIASTOLIC BLOOD PRESSURE: 63 MMHG | SYSTOLIC BLOOD PRESSURE: 111 MMHG

## 2017-04-03 VITALS — WEIGHT: 139.4 LBS | BODY MASS INDEX: 22.84 KG/M2

## 2017-04-03 DIAGNOSIS — C83.30 DIFFUSE LARGE B-CELL LYMPHOMA, UNSPECIFIED BODY REGION (HCC): Primary | ICD-10-CM

## 2017-04-03 DIAGNOSIS — K52.1 CHEMOTHERAPY INDUCED DIARRHEA: ICD-10-CM

## 2017-04-03 DIAGNOSIS — T45.1X5A CHEMOTHERAPY INDUCED DIARRHEA: ICD-10-CM

## 2017-04-03 LAB
ALBUMIN SERPL-MCNC: 3.5 G/DL (ref 3.5–5.2)
ALBUMIN/GLOB SERPL: 1.3 G/DL (ref 1.1–2.4)
ALP SERPL-CCNC: 173 U/L (ref 38–116)
ALT SERPL W P-5'-P-CCNC: 27 U/L (ref 0–33)
ANION GAP SERPL CALCULATED.3IONS-SCNC: 16.8 MMOL/L
AST SERPL-CCNC: 355 U/L (ref 0–32)
BASOPHILS # BLD AUTO: 0.03 10*3/MM3 (ref 0–0.1)
BASOPHILS NFR BLD AUTO: 0.4 % (ref 0–1.1)
BILIRUB SERPL-MCNC: 0.7 MG/DL (ref 0.1–1.2)
BUN BLD-MCNC: 20 MG/DL (ref 6–20)
BUN/CREAT SERPL: 30.8 (ref 7.3–30)
CALCIUM SPEC-SCNC: 8.6 MG/DL (ref 8.5–10.2)
CHLORIDE SERPL-SCNC: 94 MMOL/L (ref 98–107)
CO2 SERPL-SCNC: 20.2 MMOL/L (ref 22–29)
CREAT BLD-MCNC: 0.65 MG/DL (ref 0.6–1.1)
DEPRECATED RDW RBC AUTO: 46.9 FL (ref 37–49)
EOSINOPHIL # BLD AUTO: 0.05 10*3/MM3 (ref 0–0.36)
EOSINOPHIL NFR BLD AUTO: 0.7 % (ref 1–5)
ERYTHROCYTE [DISTWIDTH] IN BLOOD BY AUTOMATED COUNT: 15.5 % (ref 11.7–14.5)
GFR SERPL CREATININE-BSD FRML MDRD: 87 ML/MIN/1.73
GLOBULIN UR ELPH-MCNC: 2.6 GM/DL (ref 1.8–3.5)
GLUCOSE BLD-MCNC: 119 MG/DL (ref 74–124)
HCT VFR BLD AUTO: 31.6 % (ref 34–45)
HGB BLD-MCNC: 10.3 G/DL (ref 11.5–14.9)
HOLD SPECIMEN: NORMAL
IMM GRANULOCYTES # BLD: 0.07 10*3/MM3 (ref 0–0.03)
IMM GRANULOCYTES NFR BLD: 1 % (ref 0–0.5)
LYMPHOCYTES # BLD AUTO: 0.99 10*3/MM3 (ref 1–3.5)
LYMPHOCYTES NFR BLD AUTO: 13.6 % (ref 20–49)
MCH RBC QN AUTO: 27.7 PG (ref 27–33)
MCHC RBC AUTO-ENTMCNC: 32.6 G/DL (ref 32–35)
MCV RBC AUTO: 84.9 FL (ref 83–97)
MONOCYTES # BLD AUTO: 1.04 10*3/MM3 (ref 0.25–0.8)
MONOCYTES NFR BLD AUTO: 14.3 % (ref 4–12)
NEUTROPHILS # BLD AUTO: 5.11 10*3/MM3 (ref 1.5–7)
NEUTROPHILS NFR BLD AUTO: 70 % (ref 39–75)
NRBC BLD MANUAL-RTO: 0 /100 WBC (ref 0–0)
PLATELET # BLD AUTO: 107 10*3/MM3 (ref 150–375)
PMV BLD AUTO: 10 FL (ref 8.9–12.1)
POTASSIUM BLD-SCNC: 3.9 MMOL/L (ref 3.5–4.7)
PROT SERPL-MCNC: 6.1 G/DL (ref 6.3–8)
RBC # BLD AUTO: 3.72 10*6/MM3 (ref 3.9–5)
SODIUM BLD-SCNC: 131 MMOL/L (ref 134–145)
WBC NRBC COR # BLD: 7.29 10*3/MM3 (ref 4–10)

## 2017-04-03 PROCEDURE — 25010000002 RITUXIMAB 10 MG/ML SOLUTION 50 ML VIAL: Performed by: INTERNAL MEDICINE

## 2017-04-03 PROCEDURE — 25010000002 MEPERIDINE PER 100 MG: Performed by: NURSE PRACTITIONER

## 2017-04-03 PROCEDURE — 25010000002 DIPHENHYDRAMINE PER 50 MG: Performed by: INTERNAL MEDICINE

## 2017-04-03 PROCEDURE — 96413 CHEMO IV INFUSION 1 HR: CPT | Performed by: INTERNAL MEDICINE

## 2017-04-03 PROCEDURE — 96375 TX/PRO/DX INJ NEW DRUG ADDON: CPT | Performed by: INTERNAL MEDICINE

## 2017-04-03 PROCEDURE — 85025 COMPLETE CBC W/AUTO DIFF WBC: CPT

## 2017-04-03 PROCEDURE — 25010000002 RITUXIMAB 10 MG/ML SOLUTION 10 ML VIAL: Performed by: INTERNAL MEDICINE

## 2017-04-03 PROCEDURE — 96415 CHEMO IV INFUSION ADDL HR: CPT | Performed by: INTERNAL MEDICINE

## 2017-04-03 PROCEDURE — 80053 COMPREHEN METABOLIC PANEL: CPT

## 2017-04-03 PROCEDURE — 25010000002 HYDROCORTISONE SODIUM SUCCINATE 100 MG RECONSTITUTED SOLUTION: Performed by: INTERNAL MEDICINE

## 2017-04-03 PROCEDURE — 99212-NC PR NO CHARGE CBC OFFICE OUTPATIENT VISIT 10 MINUTES: Performed by: NURSE PRACTITIONER

## 2017-04-03 PROCEDURE — 25010000002 PROMETHAZINE PER 50 MG: Performed by: NURSE PRACTITIONER

## 2017-04-03 PROCEDURE — 25010000002 MEPERIDINE PER 100 MG: Performed by: INTERNAL MEDICINE

## 2017-04-03 RX ORDER — ACETAMINOPHEN 325 MG/1
650 TABLET ORAL ONCE
Status: COMPLETED | OUTPATIENT
Start: 2017-04-03 | End: 2017-04-03

## 2017-04-03 RX ORDER — SODIUM CHLORIDE 9 MG/ML
250 INJECTION, SOLUTION INTRAVENOUS ONCE
Status: COMPLETED | OUTPATIENT
Start: 2017-04-03 | End: 2017-04-03

## 2017-04-03 RX ORDER — MEPERIDINE HYDROCHLORIDE 25 MG/ML
12.5 INJECTION INTRAMUSCULAR; INTRAVENOUS; SUBCUTANEOUS ONCE
Status: COMPLETED | OUTPATIENT
Start: 2017-04-03 | End: 2017-04-03

## 2017-04-03 RX ORDER — MEPERIDINE HYDROCHLORIDE 50 MG/ML
25 INJECTION INTRAMUSCULAR; INTRAVENOUS; SUBCUTANEOUS
Status: COMPLETED | OUTPATIENT
Start: 2017-04-03 | End: 2017-04-03

## 2017-04-03 RX ORDER — FAMOTIDINE 10 MG/ML
20 INJECTION, SOLUTION INTRAVENOUS ONCE
Status: COMPLETED | OUTPATIENT
Start: 2017-04-03 | End: 2017-04-03

## 2017-04-03 RX ORDER — FAMOTIDINE 10 MG/ML
20 INJECTION, SOLUTION INTRAVENOUS ONCE
Status: CANCELLED | OUTPATIENT
Start: 2017-04-03

## 2017-04-03 RX ADMIN — MEPERIDINE HYDROCHLORIDE 12.5 MG: 25 INJECTION, SOLUTION INTRAMUSCULAR; INTRAVENOUS; SUBCUTANEOUS at 12:33

## 2017-04-03 RX ADMIN — FAMOTIDINE 20 MG: 10 INJECTION, SOLUTION INTRAVENOUS at 10:01

## 2017-04-03 RX ADMIN — PROMETHAZINE HYDROCHLORIDE 12.5 MG: 25 INJECTION INTRAMUSCULAR; INTRAVENOUS at 12:12

## 2017-04-03 RX ADMIN — RITUXIMAB 640 MG: 10 INJECTION, SOLUTION INTRAVENOUS at 12:59

## 2017-04-03 RX ADMIN — DIPHENHYDRAMINE HYDROCHLORIDE 50 MG: 50 INJECTION, SOLUTION INTRAMUSCULAR; INTRAVENOUS at 10:05

## 2017-04-03 RX ADMIN — HYDROCORTISONE SODIUM SUCCINATE 100 MG: 100 INJECTION, POWDER, FOR SOLUTION INTRAMUSCULAR; INTRAVENOUS at 12:24

## 2017-04-03 RX ADMIN — MEPERIDINE HYDROCHLORIDE 12.5 MG: 50 INJECTION, SOLUTION INTRAMUSCULAR; INTRAVENOUS; SUBCUTANEOUS at 12:13

## 2017-04-03 RX ADMIN — RITUXIMAB 640 MG: 10 INJECTION, SOLUTION INTRAVENOUS at 10:56

## 2017-04-03 RX ADMIN — ACETAMINOPHEN 650 MG: 325 TABLET ORAL at 10:01

## 2017-04-03 RX ADMIN — MEPERIDINE HYDROCHLORIDE 12.5 MG: 50 INJECTION, SOLUTION INTRAMUSCULAR; INTRAVENOUS; SUBCUTANEOUS at 12:18

## 2017-04-03 RX ADMIN — SODIUM CHLORIDE 250 ML: 900 INJECTION, SOLUTION INTRAVENOUS at 09:45

## 2017-04-03 NOTE — PROGRESS NOTES
Rituxan Reaction:   12:08pm:  Nurse was called to the patient's room by one of the pt's daughters. Pt was c/o being cold but was not visibly shaking. A warm blanket was placed over the pt. BP was 128/63. Pt then stated that she felt cold in her lungs and her chest was getting tight.   12:09pm: Rituxan infusion was stopped. A second nurse, Betsey, and nurse practitioner, Cordelia Purcell, was called to the pt's room.   12:12pm: Phenergan 12.5mg was ordered by WP,APRN and administered IVP for pt c/o nausea.   12:13pm: Demerol 12.5mg was ordered by WP and administered IVP for rigors.  12:17pm: /82; pulse 142. Pt states that rigors and nausea are improved although rigors are still visible.  12:18pm: Demerol 12.5mg was ordered by WP and administered IVP.  12:24pm: SoluCortef 100mg was ordered by WP and administered IVP for persistent rigors and pt discomfort.  12:27pm: Pt is lying in bed with eyes closed but still with rigors, only somewhat improved.  /84;pulse 87.  12:33pm: Demerol 12.5mg was ordered by WP and administered IVP for persistent rigors.  12:36pm: /59; pulse 107. Cordelia left the pt's side after instructing treating nurse to restart the Rituxan at the 100mg rate in about 15 minutes if pt status is stable.  12:39pm: Pt resting quietly with eyes closed.   12:59pm: Rituxan infusion restarted.  Pt completed the infusion without further incident and left the infusion area at the end of the treatment via wc to conserve her energy since she has @20 steps to climb when she arrives home.

## 2017-04-03 NOTE — PROGRESS NOTES
Subjective     PATIENT NAME:  Guerita De La Fuente  YOB: 1933  PATIENTS AGE:  83 y.o.  PATIENTS SEX:  female  DATE OF SERVICE:  04/03/2017  PROVIDER:  DANIAL Richards      ____________________PATIENT EDUCATION____________________    PATIENT EDUCATION:  Today I met with the patient to discuss the chemotherapy regimen recommended for treatment of her disease.  The patient was given explanation of treatment premed side effects including office policy that prohibits patients to drive if sedating medications are administered, MD explanation given regarding benefits, side effects, toxicities and goals of treatment.  The patient received a Chemotherapy/Biotherapy Plan Summary including diagnosis and specific treatment plan.    SIDE EFFECTS:  Common side effects were discussed with the patient and her two daughters.  Discussion included hair loss/discoloration, anemia/fatigue, infection/chills/fever, appetite, bleeding risk/precautions, constipation, diarrhea, mouth sores, taste alteration, loss of appetite,nausea/vomiting, peripheral neuropathy, skin/nail changes, rash, muscle aches/weakness, photosensitivity, weight gain/loss, hearing loss, dizziness, menopausal symptoms, menstrrual irregularity, sterility, high blood pressure, heart damage, liver damage, lung damage, kidney damage, DVT/PE risk, fluid retention, pleural/pericardial effusion, somnolence, electrolyte/LFT imbalance, vein exercises and/or the possible need for vascular access/port placement.  The patient was advice that although uncommon, leakage of an infused medication from the vein or venous access device (port) may lead to skin breakdown and/or other tissue damage.  The patient was advised that he/she may have pain, bleeding, and/or bruising from the insertion of a needle in their vein or venous access device (port).  The patient was further advised that, in spite of proper technique, infection with redness and irritation may rarely  occur at the site where the needle was inserted.  The patient was advised that if complications occur, additional medical treatment is available.    Discussion also included side effects specific to drugs in the treatment plan, specifically Rituxan.    A total of 25 minutes were spent with the patient, with 100% of time spent in education and counseling.

## 2017-04-11 ENCOUNTER — INFUSION (OUTPATIENT)
Dept: ONCOLOGY | Facility: HOSPITAL | Age: 82
End: 2017-04-11

## 2017-04-11 ENCOUNTER — APPOINTMENT (OUTPATIENT)
Dept: ONCOLOGY | Facility: HOSPITAL | Age: 82
End: 2017-04-11

## 2017-04-11 ENCOUNTER — OFFICE VISIT (OUTPATIENT)
Dept: ONCOLOGY | Facility: CLINIC | Age: 82
End: 2017-04-11

## 2017-04-11 ENCOUNTER — APPOINTMENT (OUTPATIENT)
Dept: ONCOLOGY | Facility: CLINIC | Age: 82
End: 2017-04-11

## 2017-04-11 VITALS
SYSTOLIC BLOOD PRESSURE: 132 MMHG | RESPIRATION RATE: 16 BRPM | HEART RATE: 75 BPM | DIASTOLIC BLOOD PRESSURE: 64 MMHG | OXYGEN SATURATION: 97 % | TEMPERATURE: 98 F | WEIGHT: 136 LBS | BODY MASS INDEX: 21.86 KG/M2 | HEIGHT: 66 IN

## 2017-04-11 VITALS — DIASTOLIC BLOOD PRESSURE: 79 MMHG | SYSTOLIC BLOOD PRESSURE: 194 MMHG | HEART RATE: 83 BPM

## 2017-04-11 DIAGNOSIS — C83.30 DIFFUSE LARGE B-CELL LYMPHOMA, UNSPECIFIED BODY REGION (HCC): Primary | ICD-10-CM

## 2017-04-11 DIAGNOSIS — C83.30 DIFFUSE LARGE B-CELL LYMPHOMA, UNSPECIFIED BODY REGION (HCC): ICD-10-CM

## 2017-04-11 LAB
BASOPHILS # BLD AUTO: 0.06 10*3/MM3 (ref 0–0.1)
BASOPHILS NFR BLD AUTO: 0.6 % (ref 0–1.1)
DEPRECATED RDW RBC AUTO: 51 FL (ref 37–49)
EOSINOPHIL # BLD AUTO: 0.5 10*3/MM3 (ref 0–0.36)
EOSINOPHIL NFR BLD AUTO: 5.4 % (ref 1–5)
ERYTHROCYTE [DISTWIDTH] IN BLOOD BY AUTOMATED COUNT: 16.7 % (ref 11.7–14.5)
HCT VFR BLD AUTO: 30.4 % (ref 34–45)
HGB BLD-MCNC: 9.9 G/DL (ref 11.5–14.9)
HOLD SPECIMEN: NORMAL
HOLD SPECIMEN: NORMAL
IMM GRANULOCYTES # BLD: 0.06 10*3/MM3 (ref 0–0.03)
IMM GRANULOCYTES NFR BLD: 0.6 % (ref 0–0.5)
LYMPHOCYTES # BLD AUTO: 1.71 10*3/MM3 (ref 1–3.5)
LYMPHOCYTES NFR BLD AUTO: 18.4 % (ref 20–49)
MCH RBC QN AUTO: 28.4 PG (ref 27–33)
MCHC RBC AUTO-ENTMCNC: 32.6 G/DL (ref 32–35)
MCV RBC AUTO: 87.1 FL (ref 83–97)
MONOCYTES # BLD AUTO: 0.68 10*3/MM3 (ref 0.25–0.8)
MONOCYTES NFR BLD AUTO: 7.3 % (ref 4–12)
NEUTROPHILS # BLD AUTO: 6.29 10*3/MM3 (ref 1.5–7)
NEUTROPHILS NFR BLD AUTO: 67.7 % (ref 39–75)
NRBC BLD MANUAL-RTO: 0 /100 WBC (ref 0–0)
PLATELET # BLD AUTO: 255 10*3/MM3 (ref 150–375)
PMV BLD AUTO: 8.7 FL (ref 8.9–12.1)
RBC # BLD AUTO: 3.49 10*6/MM3 (ref 3.9–5)
WBC NRBC COR # BLD: 9.3 10*3/MM3 (ref 4–10)

## 2017-04-11 PROCEDURE — 25010000002 HYDROCORTISONE SODIUM SUCCINATE 100 MG RECONSTITUTED SOLUTION: Performed by: NURSE PRACTITIONER

## 2017-04-11 PROCEDURE — 85025 COMPLETE CBC W/AUTO DIFF WBC: CPT

## 2017-04-11 PROCEDURE — 36415 COLL VENOUS BLD VENIPUNCTURE: CPT

## 2017-04-11 PROCEDURE — 96415 CHEMO IV INFUSION ADDL HR: CPT | Performed by: NURSE PRACTITIONER

## 2017-04-11 PROCEDURE — 25010000002 DIPHENHYDRAMINE PER 50 MG: Performed by: NURSE PRACTITIONER

## 2017-04-11 PROCEDURE — 25010000002 RITUXIMAB 10 MG/ML SOLUTION 50 ML VIAL: Performed by: NURSE PRACTITIONER

## 2017-04-11 PROCEDURE — 96375 TX/PRO/DX INJ NEW DRUG ADDON: CPT | Performed by: NURSE PRACTITIONER

## 2017-04-11 PROCEDURE — 96413 CHEMO IV INFUSION 1 HR: CPT | Performed by: NURSE PRACTITIONER

## 2017-04-11 PROCEDURE — 99212-NC PR NO CHARGE CBC OFFICE OUTPATIENT VISIT 10 MINUTES: Performed by: NURSE PRACTITIONER

## 2017-04-11 PROCEDURE — 25010000002 RITUXIMAB 10 MG/ML SOLUTION 10 ML VIAL: Performed by: NURSE PRACTITIONER

## 2017-04-11 RX ORDER — SODIUM CHLORIDE 9 MG/ML
250 INJECTION, SOLUTION INTRAVENOUS ONCE
Status: COMPLETED | OUTPATIENT
Start: 2017-04-11 | End: 2017-04-11

## 2017-04-11 RX ORDER — ACETAMINOPHEN 325 MG/1
650 TABLET ORAL ONCE
Status: COMPLETED | OUTPATIENT
Start: 2017-04-11 | End: 2017-04-11

## 2017-04-11 RX ORDER — FAMOTIDINE 10 MG/ML
20 INJECTION, SOLUTION INTRAVENOUS ONCE
Status: COMPLETED | OUTPATIENT
Start: 2017-04-11 | End: 2017-04-11

## 2017-04-11 RX ADMIN — RITUXIMAB 640 MG: 10 INJECTION, SOLUTION INTRAVENOUS at 11:06

## 2017-04-11 RX ADMIN — ACETAMINOPHEN 650 MG: 325 TABLET ORAL at 10:04

## 2017-04-11 RX ADMIN — SODIUM CHLORIDE 250 ML: 900 INJECTION, SOLUTION INTRAVENOUS at 10:04

## 2017-04-11 RX ADMIN — FAMOTIDINE 20 MG: 10 INJECTION, SOLUTION INTRAVENOUS at 10:04

## 2017-04-11 RX ADMIN — DIPHENHYDRAMINE HYDROCHLORIDE 25 MG: 50 INJECTION, SOLUTION INTRAMUSCULAR; INTRAVENOUS at 10:04

## 2017-04-11 RX ADMIN — HYDROCORTISONE SODIUM SUCCINATE 50 MG: 100 INJECTION, POWDER, FOR SOLUTION INTRAMUSCULAR; INTRAVENOUS at 10:04

## 2017-04-11 NOTE — PROGRESS NOTES
REASON FOR FOLLOW-UP:    1.  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  IPI 4 out of 5 = high risk.  2. Therapy with R-miniCHOP initiated on 5/5/2016, complete as of 8/19/2016.    3.  Initiation of Rituxan for 4/4/2017         Diffuse large B cell lymphoma    4/14/2016 Initial Diagnosis    Diffuse large B cell lymphoma by ultrasound-guided left supraclavicular lymph node biopsy.  Ki-67>95%.      4/28/2016 Imaging    PET scan:  There is fairly extensive lymphoma as described in detail  above. There are areas of intense metabolic activity localizing to the  spinal canal at the midthoracic level as well as low lumbar and sacral  levels. There is remarkably little detectable abnormality at these  levels by CT. MRI would be the best means for further delineation of  anatomic pathology. The degree of activity certainly could indicate an  underlying mass and cord compression should be evaluated. Furthermore,  there is noted a very small nodular focus of uptake within the upper  inner quadrant of the left breast which appears to correspond to tiny  nodular foci seen at concurrent CT imaging. The finding is nonspecific,  however should be closely followed after appropriate therapy for  lymphoma to ensure resolution and to exclude a possible breast  malignancy.      4/29/2016 Surgery    Mediport placed by Dr. Mihir Arrieta.      5/5/2016 - 8/19/2016 Chemotherapy    R-miniCHOP      9/9/2016 Imaging    PET scan:  There has been a dramatic interval improvement. There has been  resolution of all of the previously seen intensely hypermetabolic  lesions involving the thorax, spine, retroperitoneum, and pelvis.      There is a new focus of hypermetabolic activity at the mid sigmoid colon  where there is mild acute diverticulitis, image 284.      3/22/2017 Imaging    IMPRESSION:  1. The tiny hypermetabolic nodes at the left axilla and left internal  mammary chain are similar  to the hypermetabolic nodes seen on the PET/CT  from 04/28/2016. This suggests recurrence. The new hypermetabolic focus  within the left T2 lamina is concerning for a metastasis as well.  2. There is unusual hypermetabolic activity within the central aspect of  the right hepatic lobe corresponds to a much larger right portal vein  than on previous examinations. This suggests right portal vein  thrombosis. Further evaluation with contrast-enhanced CT of the abdomen  is recommended.  3. The symmetrical hypermetabolic activity throughout both lung fields  is of uncertain etiology as no airspace consolidations are present on  the corresponding CT. Extensive pneumonitis may have diffuse lung  activity, but the appearance on CT is not suggestive of pneumonitis. The  etiology of this finding is uncertain.      3/24/2017 Imaging    IMPRESSION:  Mildly enlarged bilateral axillary and mediastinal and left  internal mammary chain lymph nodes that showed mild hypermetabolism on a  recent PET/CT study consistent with recurrent lymphoma. No lung  abnormalities are identified to account for the low level abnormal  uptake noted on the PET/CT study. There is also no evidence of a lytic  or blastic lesion in the left lamina of T2 were the PET/CT study showed  focal increased activity. This does not exclude metastatic disease in  this location. Images of the abdomen and pelvis show no evidence of  recurrent lymphoma. There is no evidence of portal vein thrombosis.         HISTORY OF PRESENT ILLNESS:  Guerita De La Fuente is a 83 y.o. female who returns today for follow up After initiating Rituxan last week.  She tolerated the first week fairly well except for tongue swelling intermittently.  This was worse early in the morning and did improve his a day when on.  She notices first happening about a day after the Rituxan.  She did take Benadryl once which was helpful.  She did have chills secondary to Rituxan the day of treatment that was  "responsive to Demerol.  Otherwise she did well over the past week, denying fevers or other concerns.    PAST MEDICAL, SURGICAL, FAMILY, AND SOCIAL HISTORIES were reviewed with the patient and in the electronic medical record, and were updated if indicated.    ALLERGIES:  Allergies   Allergen Reactions   • Allopurinol    • Codeine GI Intolerance   • Levaquin [Levofloxacin]    • Shellfish-Derived Products Other (See Comments)     \"CRABS\" \"I CAN'T REMEMBER WHAT THE REACTION WAS\"   • Zofran [Ondansetron Hcl] Hives   • Penicillins Rash   • Sulfa Antibiotics Rash       MEDICATIONS:  The medication list has been reviewed with the patient by the medical assistant, and the list has been updated in the electronic medical record, which I reviewed.  Medication dosages and frequencies were confirmed to be accurate.    REVIEW OF SYSTEMS:  PAIN:  See Vital Signs below.  GENERAL:  See HPI  SKIN: No rash  HEME/LYMPH:  No abnormal bleeding.    EYES:  No vision changes or diplopia.  ENT:  No sore throat or difficulty swallowing.  RESPIRATORY:  No cough, shortness of breath, hemoptysis, or wheezing.  CARDIOVASCULAR:  No chest pain, palpitations, orthopnea.  Mild dyspnea on exertion.  GASTROINTESTINAL:  See history of present illness  GENITOURINARY:  No dysuria or hematuria.  MUSCULOSKELETAL:  Occasional right hip and back pain.  NEUROLOGIC:  No neuropathy.    PSYCHIATRIC:  No depression, anxiety, or mood changes.    Vitals:    04/11/17 0852   BP: 132/64   Pulse: 75   Resp: 16   Temp: 98 °F (36.7 °C)   TempSrc: Oral   SpO2: 97%   Weight: 136 lb (61.7 kg)   Height: 65.5\" (166.4 cm)   PainSc: 0-No pain   ECOG 2-3    PHYSICAL EXAMINATION:  GENERAL:  Well-developed well-nourished female; awake, alert and oriented, in no acute distress.  SKIN:  No rash or nonhealing lesions  HEAD:  Normocephalic, atraumatic.  EYES:  Pupils equal, round and reactive to light.  Extraocular movements intact.  Conjunctivae normal.  EARS:  Hearing intact.  NOSE:  " Septum midline.  No excoriations or nasal discharge.  MOUTH:  No stomatitis or ulcers.  Lips are normal.  No gingival erythema.  THROAT:  Oropharynx without lesions or exudates.  NECK:  Supple with good range of motion; no thyromegaly or masses; no JVD or bruits.  CHEST:  Lungs are clear to auscultation bilaterally.  No wheezes, rales, or rhonchi.  A Mediport is present in the right subclavian area that is benign in appearance.  HEART:  Regular rate and rhythm.  No murmurs, gallops or rubs.  ABDOMEN:  Soft, non-tender, non-distended.  Normal active bowel sounds.  No organomegaly.  EXTREMITIES:  No clubbing, cyanosis, or edema.  NEUROLOGICAL:  No focal neurologic deficits.    DIAGNOSTIC DATA:  Lab Results   Component Value Date    WBC 9.30 04/11/2017    HGB 9.9 (L) 04/11/2017    HCT 30.4 (L) 04/11/2017    MCV 87.1 04/11/2017     04/11/2017     Imaging: PET scan images and CT images from last week were reviewed.  Diffuse uptake in the lungs on the PET.  Hypermetabolic area in the liver on the PET scan.  Scattered lymphadenopathy otherwise.  No abnormalities on CT imaging in the lungs or liver.    MRI brain from 3/28/2017 images personally reviewed.  No abnormalities.  MRCP imaging from 3/20/2017 personally reviewed.  I do not see anything obvious.  Radiology interpretation is pending.    ASSESSMENT:  This is a 83 y.o. female with:  1.  Stage IVb aggressive appearing diffuse large B-cell lymphoma.  IPI score was 4.  There was likely central nervous system involvement in the spinal canal.  She declined intrathecal therapy.  She completed 6 cycles of therapy with miniCHOP with a complete response.  On review of her chemotherapy regimen from 2016 it actually does not appear that she received Rituxan with her chemotherapy.     The patient did initiate Rituxan weekly ×4 on 4/4/2017.  She is due for week #2 today we will proceed with the addition of 50 mg Solu-Cortef premedication due to her sensitivity to the first  treatment as well as tongue swelling.  She'll take Benadryl at home for tongue swelling.    2. 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  3.  Mediport which requires maintenance every 4-6 weeks.  4.  Nausea: She states an allergy to Zofran which causes a rash.  She had a rash due to allopurinol but not one due to Zofran than I recall.  She is taking 5 mg of Compazine which she states is sedating.  She also has Phenergan to use at home.  We will increase steroids as noted below.  5.  Global weakness: She feels that her weakness is improving and we will decrease her steroids from 20 mg daily to 10 mg daily.    PLAN:  1.  Proceed with Rituxan today.  2.  Add Solu-Cortef 50 mg to premedications.    3.  If she improves after completing Rituxan, will plan for bendamustine and Rituxan.  4.  She will use Benadryl at home for any tongue swelling.  She will call the office for any new symptoms or worsening tongue swelling, or she will go to the ER the difficulty breathing.

## 2017-04-12 RX ORDER — PREDNISONE 10 MG/1
20 TABLET ORAL DAILY
Qty: 60 TABLET | Refills: 1 | Status: SHIPPED | OUTPATIENT
Start: 2017-04-12 | End: 2017-07-19 | Stop reason: SDUPTHER

## 2017-04-12 NOTE — TELEPHONE ENCOUNTER
rec'd message from pharmacy that patient is states she is suppose to take prednisone 20 mg daily, but that they have not rec'd new RX for prednisone 20 mg tabs.     Pt was given RX for prednisone 10 mg tabs with orders for one tab daily #30 and 5 additional refills.     See where RN had entered that patient takes 20 mg daily new rx scribed.

## 2017-04-18 ENCOUNTER — INFUSION (OUTPATIENT)
Dept: ONCOLOGY | Facility: HOSPITAL | Age: 82
End: 2017-04-18

## 2017-04-18 ENCOUNTER — OFFICE VISIT (OUTPATIENT)
Dept: ONCOLOGY | Facility: CLINIC | Age: 82
End: 2017-04-18

## 2017-04-18 VITALS — SYSTOLIC BLOOD PRESSURE: 112 MMHG | DIASTOLIC BLOOD PRESSURE: 58 MMHG | HEART RATE: 75 BPM

## 2017-04-18 VITALS
RESPIRATION RATE: 16 BRPM | TEMPERATURE: 97.7 F | BODY MASS INDEX: 21.44 KG/M2 | HEIGHT: 66 IN | DIASTOLIC BLOOD PRESSURE: 62 MMHG | SYSTOLIC BLOOD PRESSURE: 126 MMHG | HEART RATE: 72 BPM | WEIGHT: 133.4 LBS | OXYGEN SATURATION: 97 %

## 2017-04-18 DIAGNOSIS — C83.30 DIFFUSE LARGE B-CELL LYMPHOMA, UNSPECIFIED BODY REGION (HCC): ICD-10-CM

## 2017-04-18 DIAGNOSIS — C83.32 DIFFUSE LARGE B-CELL LYMPHOMA OF INTRATHORACIC LYMPH NODES (HCC): Primary | ICD-10-CM

## 2017-04-18 DIAGNOSIS — C83.30 DIFFUSE LARGE B-CELL LYMPHOMA, UNSPECIFIED BODY REGION (HCC): Primary | ICD-10-CM

## 2017-04-18 LAB
ALBUMIN SERPL-MCNC: 4 G/DL (ref 3.5–5.2)
ALBUMIN/GLOB SERPL: 1.5 G/DL (ref 1.1–2.4)
ALP SERPL-CCNC: 106 U/L (ref 38–116)
ALT SERPL W P-5'-P-CCNC: 19 U/L (ref 0–33)
ANION GAP SERPL CALCULATED.3IONS-SCNC: 15.2 MMOL/L
AST SERPL-CCNC: 30 U/L (ref 0–32)
BASOPHILS # BLD AUTO: 0.08 10*3/MM3 (ref 0–0.1)
BASOPHILS NFR BLD AUTO: 0.9 % (ref 0–1.1)
BILIRUB SERPL-MCNC: 0.3 MG/DL (ref 0.1–1.2)
BUN BLD-MCNC: 13 MG/DL (ref 6–20)
BUN/CREAT SERPL: 19.4 (ref 7.3–30)
CALCIUM SPEC-SCNC: 9.4 MG/DL (ref 8.5–10.2)
CHLORIDE SERPL-SCNC: 98 MMOL/L (ref 98–107)
CO2 SERPL-SCNC: 23.8 MMOL/L (ref 22–29)
CREAT BLD-MCNC: 0.67 MG/DL (ref 0.6–1.1)
DEPRECATED RDW RBC AUTO: 58.3 FL (ref 37–49)
EOSINOPHIL # BLD AUTO: 0.28 10*3/MM3 (ref 0–0.36)
EOSINOPHIL NFR BLD AUTO: 3.3 % (ref 1–5)
ERYTHROCYTE [DISTWIDTH] IN BLOOD BY AUTOMATED COUNT: 18.1 % (ref 11.7–14.5)
GFR SERPL CREATININE-BSD FRML MDRD: 84 ML/MIN/1.73
GLOBULIN UR ELPH-MCNC: 2.7 GM/DL (ref 1.8–3.5)
GLUCOSE BLD-MCNC: 92 MG/DL (ref 74–124)
HCT VFR BLD AUTO: 33.8 % (ref 34–45)
HGB BLD-MCNC: 10.9 G/DL (ref 11.5–14.9)
HOLD SPECIMEN: NORMAL
HOLD SPECIMEN: NORMAL
IMM GRANULOCYTES # BLD: 0.03 10*3/MM3 (ref 0–0.03)
IMM GRANULOCYTES NFR BLD: 0.4 % (ref 0–0.5)
LDH SERPL-CCNC: 470 U/L (ref 99–259)
LYMPHOCYTES # BLD AUTO: 1.84 10*3/MM3 (ref 1–3.5)
LYMPHOCYTES NFR BLD AUTO: 21.5 % (ref 20–49)
MCH RBC QN AUTO: 28.8 PG (ref 27–33)
MCHC RBC AUTO-ENTMCNC: 32.2 G/DL (ref 32–35)
MCV RBC AUTO: 89.2 FL (ref 83–97)
MONOCYTES # BLD AUTO: 0.49 10*3/MM3 (ref 0.25–0.8)
MONOCYTES NFR BLD AUTO: 5.7 % (ref 4–12)
NEUTROPHILS # BLD AUTO: 5.83 10*3/MM3 (ref 1.5–7)
NEUTROPHILS NFR BLD AUTO: 68.2 % (ref 39–75)
NRBC BLD MANUAL-RTO: 0 /100 WBC (ref 0–0)
PLATELET # BLD AUTO: 263 10*3/MM3 (ref 150–375)
PMV BLD AUTO: 9 FL (ref 8.9–12.1)
POTASSIUM BLD-SCNC: 3.8 MMOL/L (ref 3.5–4.7)
PROT SERPL-MCNC: 6.7 G/DL (ref 6.3–8)
RBC # BLD AUTO: 3.79 10*6/MM3 (ref 3.9–5)
SODIUM BLD-SCNC: 137 MMOL/L (ref 134–145)
WBC NRBC COR # BLD: 8.55 10*3/MM3 (ref 4–10)

## 2017-04-18 PROCEDURE — 25010000002 HYDROCORTISONE SODIUM SUCCINATE 100 MG RECONSTITUTED SOLUTION: Performed by: INTERNAL MEDICINE

## 2017-04-18 PROCEDURE — 96375 TX/PRO/DX INJ NEW DRUG ADDON: CPT | Performed by: INTERNAL MEDICINE

## 2017-04-18 PROCEDURE — 80053 COMPREHEN METABOLIC PANEL: CPT | Performed by: INTERNAL MEDICINE

## 2017-04-18 PROCEDURE — 25010000002 RITUXIMAB 10 MG/ML SOLUTION 50 ML VIAL: Performed by: INTERNAL MEDICINE

## 2017-04-18 PROCEDURE — 96415 CHEMO IV INFUSION ADDL HR: CPT | Performed by: INTERNAL MEDICINE

## 2017-04-18 PROCEDURE — 25010000002 RITUXIMAB 10 MG/ML SOLUTION 10 ML VIAL: Performed by: INTERNAL MEDICINE

## 2017-04-18 PROCEDURE — 99213 OFFICE O/P EST LOW 20 MIN: CPT | Performed by: INTERNAL MEDICINE

## 2017-04-18 PROCEDURE — 25010000002 DIPHENHYDRAMINE PER 50 MG: Performed by: INTERNAL MEDICINE

## 2017-04-18 PROCEDURE — 96413 CHEMO IV INFUSION 1 HR: CPT | Performed by: INTERNAL MEDICINE

## 2017-04-18 PROCEDURE — 83615 LACTATE (LD) (LDH) ENZYME: CPT | Performed by: INTERNAL MEDICINE

## 2017-04-18 PROCEDURE — 85025 COMPLETE CBC W/AUTO DIFF WBC: CPT

## 2017-04-18 PROCEDURE — 36415 COLL VENOUS BLD VENIPUNCTURE: CPT

## 2017-04-18 RX ORDER — FAMOTIDINE 10 MG/ML
20 INJECTION, SOLUTION INTRAVENOUS ONCE
Status: CANCELLED | OUTPATIENT
Start: 2017-04-18

## 2017-04-18 RX ORDER — SODIUM CHLORIDE 9 MG/ML
250 INJECTION, SOLUTION INTRAVENOUS ONCE
Status: COMPLETED | OUTPATIENT
Start: 2017-04-18 | End: 2017-04-18

## 2017-04-18 RX ORDER — FAMOTIDINE 10 MG/ML
20 INJECTION, SOLUTION INTRAVENOUS ONCE
Status: CANCELLED | OUTPATIENT
Start: 2017-04-25

## 2017-04-18 RX ORDER — ACETAMINOPHEN 325 MG/1
650 TABLET ORAL ONCE
Status: COMPLETED | OUTPATIENT
Start: 2017-04-18 | End: 2017-04-18

## 2017-04-18 RX ORDER — FAMOTIDINE 10 MG/ML
20 INJECTION, SOLUTION INTRAVENOUS ONCE
Status: COMPLETED | OUTPATIENT
Start: 2017-04-18 | End: 2017-04-18

## 2017-04-18 RX ADMIN — HYDROCORTISONE SODIUM SUCCINATE 100 MG: 100 INJECTION, POWDER, FOR SOLUTION INTRAMUSCULAR; INTRAVENOUS at 09:55

## 2017-04-18 RX ADMIN — DIPHENHYDRAMINE HYDROCHLORIDE 25 MG: 50 INJECTION, SOLUTION INTRAMUSCULAR; INTRAVENOUS at 10:00

## 2017-04-18 RX ADMIN — FAMOTIDINE 20 MG: 10 INJECTION, SOLUTION INTRAVENOUS at 09:53

## 2017-04-18 RX ADMIN — ACETAMINOPHEN 650 MG: 325 TABLET ORAL at 09:53

## 2017-04-18 RX ADMIN — SODIUM CHLORIDE 250 ML: 900 INJECTION, SOLUTION INTRAVENOUS at 09:53

## 2017-04-18 RX ADMIN — RITUXIMAB 640 MG: 10 INJECTION, SOLUTION INTRAVENOUS at 10:49

## 2017-04-18 NOTE — PROGRESS NOTES
REASON FOR FOLLOW-UP:    1.  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  IPI 4 out of 5 = high risk.  2. Therapy with miniCHOP initiated on 5/5/2016, complete as of 8/19/2016.  (In retrospect, it appears she did not receive Rituxan).  Complete metabolic response to therapy by PET imaging.  3.  PET imaging on 3/22/2017 consistent with recurrence.  See below.    3.  Initiation of Rituxan for 4/4/2017    ONCOLOGIC HISTORY:     Diffuse large B cell lymphoma    4/14/2016 Initial Diagnosis    Diffuse large B cell lymphoma by ultrasound-guided left supraclavicular lymph node biopsy.  Ki-67>95%.      4/28/2016 Imaging    PET scan:  There is fairly extensive lymphoma as described in detail  above. There are areas of intense metabolic activity localizing to the  spinal canal at the midthoracic level as well as low lumbar and sacral  levels. There is remarkably little detectable abnormality at these  levels by CT. MRI would be the best means for further delineation of  anatomic pathology. The degree of activity certainly could indicate an  underlying mass and cord compression should be evaluated. Furthermore,  there is noted a very small nodular focus of uptake within the upper  inner quadrant of the left breast which appears to correspond to tiny  nodular foci seen at concurrent CT imaging. The finding is nonspecific,  however should be closely followed after appropriate therapy for  lymphoma to ensure resolution and to exclude a possible breast  malignancy.      4/29/2016 Surgery    Mediport placed by Dr. Mihir Arrieta.      5/5/2016 - 8/19/2016 Chemotherapy    R-miniCHOP      9/9/2016 Imaging    PET scan:  There has been a dramatic interval improvement. There has been  resolution of all of the previously seen intensely hypermetabolic  lesions involving the thorax, spine, retroperitoneum, and pelvis.      There is a new focus of hypermetabolic activity at the mid  sigmoid colon  where there is mild acute diverticulitis, image 284.      3/22/2017 Imaging    PET scan:  IMPRESSION:  1. The tiny hypermetabolic nodes at the left axilla and left internal  mammary chain are similar to the hypermetabolic nodes seen on the PET/CT  from 04/28/2016. This suggests recurrence. The new hypermetabolic focus  within the left T2 lamina is concerning for a metastasis as well.  2. There is unusual hypermetabolic activity within the central aspect of  the right hepatic lobe corresponds to a much larger right portal vein  than on previous examinations. This suggests right portal vein  thrombosis. Further evaluation with contrast-enhanced CT of the abdomen  is recommended.  3. The symmetrical hypermetabolic activity throughout both lung fields  is of uncertain etiology as no airspace consolidations are present on  the corresponding CT. Extensive pneumonitis may have diffuse lung  activity, but the appearance on CT is not suggestive of pneumonitis. The  etiology of this finding is uncertain.      3/24/2017 Imaging    IMPRESSION:  Mildly enlarged bilateral axillary and mediastinal and left  internal mammary chain lymph nodes that showed mild hypermetabolism on a  recent PET/CT study consistent with recurrent lymphoma. No lung  abnormalities are identified to account for the low level abnormal  uptake noted on the PET/CT study. There is also no evidence of a lytic  or blastic lesion in the left lamina of T2 were the PET/CT study showed  focal increased activity. This does not exclude metastatic disease in  this location. Images of the abdomen and pelvis show no evidence of  recurrent lymphoma. There is no evidence of portal vein thrombosis.      4/3/2017 -  Chemotherapy    OP LYMPHOMA (CLL) RiTUXimab (Weekly X 4)           HISTORY OF PRESENT ILLNESS:  Guerita De La Fuente is a 84 y.o. female who returns today for follow up,Anticipating week #3 of Rituxan.  She is tolerating the Rituxan well.  She does  "note some swelling of her tongue at night.  This has been less each evening.  She is using Benadryl at night.  We did give her 50 mg of Solu-Cortef last week because of this.  She continues prednisone 20 mg daily.  She is doing better today.  She is eating better but not able to gain weight still.  Her energy level is improving.  She was having some right upper quadrant discomfort and denies this at this point.    PAST MEDICAL, SURGICAL, FAMILY, AND SOCIAL HISTORIES were reviewed with the patient and in the electronic medical record, and were updated if indicated.    ALLERGIES:  Allergies   Allergen Reactions   • Allopurinol    • Codeine GI Intolerance   • Levaquin [Levofloxacin]    • Shellfish-Derived Products Other (See Comments)     \"CRABS\" \"I CAN'T REMEMBER WHAT THE REACTION WAS\"   • Zofran [Ondansetron Hcl] Hives   • Penicillins Rash   • Sulfa Antibiotics Rash       MEDICATIONS:  The medication list has been reviewed with the patient by the medical assistant, and the list has been updated in the electronic medical record, which I reviewed.  Medication dosages and frequencies were confirmed to be accurate.    REVIEW OF SYSTEMS:  PAIN:  See Vital Signs below.  GENERAL:  See HPI  SKIN: No rash  HEME/LYMPH:  No abnormal bleeding.    EYES:  No vision changes or diplopia.  ENT:  No sore throat or difficulty swallowing.Tongue swelling.  RESPIRATORY:  No cough, shortness of breath, hemoptysis, or wheezing.  CARDIOVASCULAR:  No chest pain, palpitations, orthopnea.  Mild dyspnea on exertion.  GASTROINTESTINAL:  See history of present illness  GENITOURINARY:  No dysuria or hematuria.  MUSCULOSKELETAL:  Occasional right hip and back pain.  NEUROLOGIC:  No neuropathy.    PSYCHIATRIC:  No depression, anxiety, or mood changes.    Vitals:    04/18/17 0847   BP: 126/62   Pulse: 72   Resp: 16   Temp: 97.7 °F (36.5 °C)   TempSrc: Oral   SpO2: 97%   Weight: 133 lb 6.4 oz (60.5 kg)   Height: 65.5\" (166.4 cm)   PainSc: 0-No pain "   ECOG 2-3    PHYSICAL EXAMINATION:  GENERAL:  Well-developed well-nourished female; awake, alert and oriented, in no acute distress.  SKIN:  No rash or nonhealing lesions  HEAD:  Normocephalic, atraumatic.  EYES:  Pupils equal, round and reactive to light.  Extraocular movements intact.  Conjunctivae normal.  EARS:  Hearing intact.  NOSE:  Septum midline.  No excoriations or nasal discharge.  MOUTH:  No stomatitis or ulcers.  Lips are normal.  No gingival erythema.  THROAT:  Oropharynx without lesions or exudates.  NECK:  Supple with good range of motion; no thyromegaly or masses; no JVD or bruits.  CHEST:  Lungs are clear to auscultation bilaterally.  No wheezes, rales, or rhonchi.  A Mediport is present in the right subclavian area that is benign in appearance.  HEART:  Regular rate and rhythm.  No murmurs, gallops or rubs.  ABDOMEN:  Soft, non-tender, non-distended.  Normal active bowel sounds.  No organomegaly.  EXTREMITIES:  No clubbing, cyanosis, or edema.  NEUROLOGICAL:  No focal neurologic deficits.    DIAGNOSTIC DATA:  Lab Results   Component Value Date    WBC 8.55 04/18/2017    HGB 10.9 (L) 04/18/2017    HCT 33.8 (L) 04/18/2017    MCV 89.2 04/18/2017     04/18/2017     Imaging: None reviewed today.    ASSESSMENT:  This is a 84 y.o. female with:  1.  Stage IVb aggressive appearing diffuse large B-cell lymphoma.  IPI score was 4.  There was likely central nervous system involvement in the spinal canal.  She declined intrathecal therapy.  She completed 6 cycles of therapy with miniCHOP with a complete response.  On review of her chemotherapy regimen from 2016 it actually does not appear that she received Rituxan with her chemotherapy.     The patient did initiate Rituxan weekly ×4 on 4/3/2017.  We will proceed with week 3 today.  She is tolerating therapy well aside from tongue swelling.  She'll continue Benadryl for this.  We will increase the Solu-Cortef to 100 mg today.  2. Grade I peripheral  neuropathy related to chemotherapy: Improving over time.  3.  Mediport which requires maintenance every 4-6 weeks.  4.  Nausea: She did not complain of this today.   5.  Global weakness: She feels that her weakness is improving.  She did not tolerate the decrease in the prednisone dose to 10 mg and therefore she continues 20 mg daily.  6.  Tongue swelling: Increase Solu-Cortef to 100 mg today.  Continue Benadryl at night.  She will notify us if this worsens.    PLAN:  1.  We will check a CMP and LDH today to make sure that these labs are improving.  2.  Proceed with week 3 of 4 weekly Rituxan doses today.  3.  Increase Solu-Cortef to 100 mg today.  4.  Continue Benadryl at night.  5.  Return next week as scheduled for week #4.  I will see her back in 2-3 weeks with plans to initiate therapy with bendamustine and Rituxan at that time.  Assuming her liver labs and LDH are improving, indicative of response to therapy, I would then plan for a PET scan after a couple of cycles of chemotherapy.  Abnormalities on imaging were not visualized on CT imaging alone or with an MRCP of the liver and therefore a PET scan is required.    ADDENDUM:  Her CMP is completely normal today and her LDH has decreased significantly.  I discussed this with her today.

## 2017-04-25 ENCOUNTER — INFUSION (OUTPATIENT)
Dept: ONCOLOGY | Facility: HOSPITAL | Age: 82
End: 2017-04-25

## 2017-04-25 ENCOUNTER — APPOINTMENT (OUTPATIENT)
Dept: LAB | Facility: HOSPITAL | Age: 82
End: 2017-04-25

## 2017-04-25 ENCOUNTER — OFFICE VISIT (OUTPATIENT)
Dept: ONCOLOGY | Facility: CLINIC | Age: 82
End: 2017-04-25

## 2017-04-25 VITALS — HEART RATE: 77 BPM | SYSTOLIC BLOOD PRESSURE: 107 MMHG | DIASTOLIC BLOOD PRESSURE: 65 MMHG

## 2017-04-25 VITALS
SYSTOLIC BLOOD PRESSURE: 130 MMHG | TEMPERATURE: 97.4 F | HEART RATE: 76 BPM | DIASTOLIC BLOOD PRESSURE: 72 MMHG | WEIGHT: 135.2 LBS | RESPIRATION RATE: 16 BRPM | BODY MASS INDEX: 21.73 KG/M2 | HEIGHT: 66 IN

## 2017-04-25 DIAGNOSIS — C83.30 DIFFUSE LARGE B-CELL LYMPHOMA, UNSPECIFIED BODY REGION (HCC): Primary | ICD-10-CM

## 2017-04-25 LAB
ALBUMIN SERPL-MCNC: 3.7 G/DL (ref 3.5–5.2)
ALBUMIN/GLOB SERPL: 1.4 G/DL (ref 1.1–2.4)
ALP SERPL-CCNC: 86 U/L (ref 38–116)
ALT SERPL W P-5'-P-CCNC: 21 U/L (ref 0–33)
ANION GAP SERPL CALCULATED.3IONS-SCNC: 12.9 MMOL/L
AST SERPL-CCNC: 28 U/L (ref 0–32)
BASOPHILS # BLD AUTO: 0.06 10*3/MM3 (ref 0–0.1)
BASOPHILS NFR BLD AUTO: 0.9 % (ref 0–1.1)
BILIRUB SERPL-MCNC: 0.3 MG/DL (ref 0.1–1.2)
BUN BLD-MCNC: 14 MG/DL (ref 6–20)
BUN/CREAT SERPL: 22.6 (ref 7.3–30)
CALCIUM SPEC-SCNC: 8.9 MG/DL (ref 8.5–10.2)
CHLORIDE SERPL-SCNC: 100 MMOL/L (ref 98–107)
CO2 SERPL-SCNC: 25.1 MMOL/L (ref 22–29)
CREAT BLD-MCNC: 0.62 MG/DL (ref 0.6–1.1)
DEPRECATED RDW RBC AUTO: 59.5 FL (ref 37–49)
EOSINOPHIL # BLD AUTO: 0.21 10*3/MM3 (ref 0–0.36)
EOSINOPHIL NFR BLD AUTO: 3.1 % (ref 1–5)
ERYTHROCYTE [DISTWIDTH] IN BLOOD BY AUTOMATED COUNT: 18.1 % (ref 11.7–14.5)
GFR SERPL CREATININE-BSD FRML MDRD: 92 ML/MIN/1.73
GLOBULIN UR ELPH-MCNC: 2.6 GM/DL (ref 1.8–3.5)
GLUCOSE BLD-MCNC: 89 MG/DL (ref 74–124)
HCT VFR BLD AUTO: 34.1 % (ref 34–45)
HGB BLD-MCNC: 11 G/DL (ref 11.5–14.9)
HOLD SPECIMEN: NORMAL
IMM GRANULOCYTES # BLD: 0.03 10*3/MM3 (ref 0–0.03)
IMM GRANULOCYTES NFR BLD: 0.4 % (ref 0–0.5)
LDH SERPL-CCNC: 282 U/L (ref 99–259)
LYMPHOCYTES # BLD AUTO: 1.59 10*3/MM3 (ref 1–3.5)
LYMPHOCYTES NFR BLD AUTO: 23.6 % (ref 20–49)
MCH RBC QN AUTO: 28.9 PG (ref 27–33)
MCHC RBC AUTO-ENTMCNC: 32.3 G/DL (ref 32–35)
MCV RBC AUTO: 89.7 FL (ref 83–97)
MONOCYTES # BLD AUTO: 0.5 10*3/MM3 (ref 0.25–0.8)
MONOCYTES NFR BLD AUTO: 7.4 % (ref 4–12)
NEUTROPHILS # BLD AUTO: 4.36 10*3/MM3 (ref 1.5–7)
NEUTROPHILS NFR BLD AUTO: 64.6 % (ref 39–75)
NRBC BLD MANUAL-RTO: 0 /100 WBC (ref 0–0)
PLATELET # BLD AUTO: 195 10*3/MM3 (ref 150–375)
PMV BLD AUTO: 8.5 FL (ref 8.9–12.1)
POTASSIUM BLD-SCNC: 3.3 MMOL/L (ref 3.5–4.7)
PROT SERPL-MCNC: 6.3 G/DL (ref 6.3–8)
RBC # BLD AUTO: 3.8 10*6/MM3 (ref 3.9–5)
SODIUM BLD-SCNC: 138 MMOL/L (ref 134–145)
WBC NRBC COR # BLD: 6.75 10*3/MM3 (ref 4–10)

## 2017-04-25 PROCEDURE — 25010000002 RITUXIMAB 10 MG/ML SOLUTION 50 ML VIAL: Performed by: NURSE PRACTITIONER

## 2017-04-25 PROCEDURE — 85025 COMPLETE CBC W/AUTO DIFF WBC: CPT

## 2017-04-25 PROCEDURE — 99212-NC PR NO CHARGE CBC OFFICE OUTPATIENT VISIT 10 MINUTES: Performed by: NURSE PRACTITIONER

## 2017-04-25 PROCEDURE — 25010000002 DIPHENHYDRAMINE PER 50 MG: Performed by: INTERNAL MEDICINE

## 2017-04-25 PROCEDURE — 36415 COLL VENOUS BLD VENIPUNCTURE: CPT

## 2017-04-25 PROCEDURE — 96413 CHEMO IV INFUSION 1 HR: CPT | Performed by: NURSE PRACTITIONER

## 2017-04-25 PROCEDURE — 96375 TX/PRO/DX INJ NEW DRUG ADDON: CPT | Performed by: NURSE PRACTITIONER

## 2017-04-25 PROCEDURE — 25010000002 RITUXIMAB 10 MG/ML SOLUTION 10 ML VIAL: Performed by: NURSE PRACTITIONER

## 2017-04-25 PROCEDURE — 96415 CHEMO IV INFUSION ADDL HR: CPT | Performed by: NURSE PRACTITIONER

## 2017-04-25 PROCEDURE — 80053 COMPREHEN METABOLIC PANEL: CPT

## 2017-04-25 PROCEDURE — 83615 LACTATE (LD) (LDH) ENZYME: CPT

## 2017-04-25 PROCEDURE — 25010000002 HYDROCORTISONE SODIUM SUCCINATE 100 MG RECONSTITUTED SOLUTION: Performed by: INTERNAL MEDICINE

## 2017-04-25 RX ORDER — SODIUM CHLORIDE 9 MG/ML
250 INJECTION, SOLUTION INTRAVENOUS ONCE
Status: COMPLETED | OUTPATIENT
Start: 2017-04-25 | End: 2017-04-25

## 2017-04-25 RX ORDER — ACETAMINOPHEN 325 MG/1
650 TABLET ORAL ONCE
Status: COMPLETED | OUTPATIENT
Start: 2017-04-25 | End: 2017-04-25

## 2017-04-25 RX ORDER — FAMOTIDINE 10 MG/ML
20 INJECTION, SOLUTION INTRAVENOUS ONCE
Status: COMPLETED | OUTPATIENT
Start: 2017-04-25 | End: 2017-04-25

## 2017-04-25 RX ADMIN — SODIUM CHLORIDE 250 ML: 900 INJECTION, SOLUTION INTRAVENOUS at 10:27

## 2017-04-25 RX ADMIN — DIPHENHYDRAMINE HYDROCHLORIDE 25 MG: 50 INJECTION, SOLUTION INTRAMUSCULAR; INTRAVENOUS at 10:27

## 2017-04-25 RX ADMIN — RITUXIMAB 640 MG: 10 INJECTION, SOLUTION INTRAVENOUS at 11:18

## 2017-04-25 RX ADMIN — HYDROCORTISONE SODIUM SUCCINATE 100 MG: 100 INJECTION, POWDER, FOR SOLUTION INTRAMUSCULAR; INTRAVENOUS at 10:27

## 2017-04-25 RX ADMIN — FAMOTIDINE 20 MG: 10 INJECTION, SOLUTION INTRAVENOUS at 10:27

## 2017-04-25 RX ADMIN — ACETAMINOPHEN 650 MG: 325 TABLET ORAL at 10:27

## 2017-04-25 NOTE — PROGRESS NOTES
REASON FOR FOLLOW-UP:    1.  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  IPI 4 out of 5 = high risk.  2. Therapy with miniCHOP initiated on 5/5/2016, complete as of 8/19/2016.  (In retrospect, it appears she did not receive Rituxan).  Complete metabolic response to therapy by PET imaging.  3.  PET imaging on 3/22/2017 consistent with recurrence.  See below.    3.  Initiation of Rituxan for 4/4/2017.    ONCOLOGIC HISTORY:     Diffuse large B cell lymphoma    4/14/2016 Initial Diagnosis    Diffuse large B cell lymphoma by ultrasound-guided left supraclavicular lymph node biopsy.  Ki-67>95%.      4/28/2016 Imaging    PET scan:  There is fairly extensive lymphoma as described in detail  above. There are areas of intense metabolic activity localizing to the  spinal canal at the midthoracic level as well as low lumbar and sacral  levels. There is remarkably little detectable abnormality at these  levels by CT. MRI would be the best means for further delineation of  anatomic pathology. The degree of activity certainly could indicate an  underlying mass and cord compression should be evaluated. Furthermore,  there is noted a very small nodular focus of uptake within the upper  inner quadrant of the left breast which appears to correspond to tiny  nodular foci seen at concurrent CT imaging. The finding is nonspecific,  however should be closely followed after appropriate therapy for  lymphoma to ensure resolution and to exclude a possible breast  malignancy.      4/29/2016 Surgery    Mediport placed by Dr. Mihir Arrieta.      5/5/2016 - 8/19/2016 Chemotherapy    R-miniCHOP      9/9/2016 Imaging    PET scan:  There has been a dramatic interval improvement. There has been  resolution of all of the previously seen intensely hypermetabolic  lesions involving the thorax, spine, retroperitoneum, and pelvis.      There is a new focus of hypermetabolic activity at the mid  sigmoid colon  where there is mild acute diverticulitis, image 284.      3/22/2017 Imaging    PET scan:  IMPRESSION:  1. The tiny hypermetabolic nodes at the left axilla and left internal  mammary chain are similar to the hypermetabolic nodes seen on the PET/CT  from 04/28/2016. This suggests recurrence. The new hypermetabolic focus  within the left T2 lamina is concerning for a metastasis as well.  2. There is unusual hypermetabolic activity within the central aspect of  the right hepatic lobe corresponds to a much larger right portal vein  than on previous examinations. This suggests right portal vein  thrombosis. Further evaluation with contrast-enhanced CT of the abdomen  is recommended.  3. The symmetrical hypermetabolic activity throughout both lung fields  is of uncertain etiology as no airspace consolidations are present on  the corresponding CT. Extensive pneumonitis may have diffuse lung  activity, but the appearance on CT is not suggestive of pneumonitis. The  etiology of this finding is uncertain.      3/24/2017 Imaging    IMPRESSION:  Mildly enlarged bilateral axillary and mediastinal and left  internal mammary chain lymph nodes that showed mild hypermetabolism on a  recent PET/CT study consistent with recurrent lymphoma. No lung  abnormalities are identified to account for the low level abnormal  uptake noted on the PET/CT study. There is also no evidence of a lytic  or blastic lesion in the left lamina of T2 were the PET/CT study showed  focal increased activity. This does not exclude metastatic disease in  this location. Images of the abdomen and pelvis show no evidence of  recurrent lymphoma. There is no evidence of portal vein thrombosis.      4/3/2017 -  Chemotherapy    OP LYMPHOMA (CLL) RiTUXimab (Weekly X 4)           HISTORY OF PRESENT ILLNESS:  Guerita De La Fuente is a 84 y.o. female who returns today for follow up, anticipating week #4 of Rituxan.  She is tolerating the Rituxan well.  She does  "note some swelling of her tongue at night.  This lasts for about a week, getting better each night.  This treated appropriately with Benadryl.  She is receiving IV Solu-Cortef with Rituxan after experiencing some chilling with week #1.      She continues prednisone 20 mg daily, taken 10 mg in the morning and 10 mg with lunch.  This was started due to decreased energy level.  She's been doing well on this and is gained a little bit of weight.  She is asking at one point she should come off of this.  We discussed that coming off of this will be a slow gradual process.  We can begin this now but it will take some time before she is completely off steroids.  She verbalized understanding.  She is accompanied by her daughter today and both deny further concerns at this time.    PAST MEDICAL, SURGICAL, FAMILY, AND SOCIAL HISTORIES were reviewed with the patient and in the electronic medical record, and were updated if indicated.    ALLERGIES:  Allergies   Allergen Reactions   • Allopurinol    • Codeine GI Intolerance   • Levaquin [Levofloxacin]    • Shellfish-Derived Products Other (See Comments)     \"CRABS\" \"I CAN'T REMEMBER WHAT THE REACTION WAS\"   • Zofran [Ondansetron Hcl] Hives   • Penicillins Rash   • Sulfa Antibiotics Rash       MEDICATIONS:  The medication list has been reviewed with the patient by the medical assistant, and the list has been updated in the electronic medical record, which I reviewed.  Medication dosages and frequencies were confirmed to be accurate.    REVIEW OF SYSTEMS:  PAIN:  See Vital Signs below.  GENERAL:  See HPI  SKIN: No rash  HEME/LYMPH:  No abnormal bleeding.    EYES:  No vision changes or diplopia.  ENT:  No sore throat or difficulty swallowing.Tongue swelling.  RESPIRATORY:  No cough, shortness of breath, hemoptysis, or wheezing.  CARDIOVASCULAR:  No chest pain, palpitations, orthopnea.  Mild dyspnea on exertion.  GASTROINTESTINAL:  Negative for abdominal distention, abdominal pain, " "blood in stool, constipation, diarrhea, nausea and vomiting.   GENITOURINARY:  No dysuria or hematuria.  MUSCULOSKELETAL:  Occasional right hip and back pain.  NEUROLOGIC:  No neuropathy.    PSYCHIATRIC:  No depression, anxiety, or mood changes.    Vitals:    04/25/17 0939   BP: 130/72   Pulse: 76   Resp: 16   Temp: 97.4 °F (36.3 °C)   Weight: 135 lb 3.2 oz (61.3 kg)   Height: 65.5\" (166.4 cm)   PainSc: 0-No pain   ECOG 1    PHYSICAL EXAMINATION:  GENERAL:  Well-developed well-nourished female; awake, alert and oriented, in no acute distress.  SKIN:  No rash or nonhealing lesions  HEAD:  Normocephalic, atraumatic.  EYES:  Pupils equal, round and reactive to light.  Extraocular movements intact.  Conjunctivae normal.  EARS:  Hearing intact.  NOSE:  Septum midline.  No excoriations or nasal discharge.  MOUTH:  No stomatitis or ulcers.  Lips are normal.  No gingival erythema.  THROAT:  Oropharynx without lesions or exudates.  NECK:  Supple with good range of motion; no thyromegaly or masses; no JVD or bruits.  CHEST:  Lungs are clear to auscultation bilaterally.  No wheezes, rales, or rhonchi.  A Mediport is present in the right subclavian area that is benign in appearance.  HEART:  Regular rate and rhythm.  No murmurs, gallops or rubs.  ABDOMEN:  Soft, non-tender, non-distended.  Normal active bowel sounds.  No organomegaly.  EXTREMITIES:  No clubbing, cyanosis, or edema.  NEUROLOGICAL:  No focal neurologic deficits.    DIAGNOSTIC DATA:  Lab Results   Component Value Date    WBC 6.75 04/25/2017    HGB 11.0 (L) 04/25/2017    HCT 34.1 04/25/2017    MCV 89.7 04/25/2017     04/25/2017     Imaging: None reviewed today.    ASSESSMENT:  This is a 84 y.o. female with:  1.  Stage IVb aggressive appearing diffuse large B-cell lymphoma.  IPI score was 4.  There was likely central nervous system involvement in the spinal canal.  She declined intrathecal therapy.  She completed 6 cycles of therapy with miniCHOP with a " complete response.  On review of her chemotherapy regimen from 2016 it actually does not appear that she received Rituxan with her chemotherapy.     The patient did initiate Rituxan weekly ×4 on 4/3/2017.  We will proceed with week 4 today.  She is tolerating therapy well aside from tongue swelling at night.  She will continue Benadryl for this.  We will continue the Solu-Cortef 100 mg with treatment today.  2. 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  3.  Mediport which requires maintenance every 4-6 weeks.  4.  Nausea: She did not complain of this today.   5.  Global weakness: She feels that her weakness has improved.  She is taking prednisone 20 mg daily, specifically 10 mg with breakfast and 10 mg with lunch.  She is asking about when she should stop this.  We discussed beginning a slow taper, specifically taking 10 mg in the morning and 5 mg with lunch for the next week.  If she tolerates this well, after one week she will go down to 10 mg in the morning only.  She will then be seen back in 2 weeks by Dr. Ramírez, at which time we can discuss further taper based on how she is doing.    6.  Tongue swelling nightly for about a week after Rituxan infusion. Continue IV Solu-Cortef to 100 mg with treatment today.  Continue Benadryl at night.  She will notify us if this worsens.    PLAN:  1.  Proceed with week 4 of 4 weekly Rituxan today.  2.  Solu-Cortef to 100 mg today.  3.  Continue Benadryl at night.  4.  Taper Prednisone to 10 mg with breakfast, 5 mg with lunch x1 week. If tolerated, after 1 week, decrease further to 10mg with breakfast only.  5.  Return in 2 weeks for MD f/u with Dr. Ramírez. She is scheduled at that time to begin Treanda/Rituxan if still doing well.  LDH has improved, noting response to Rituxan therapy.  We are planning to repeat PET scan after a couple of cycles of chemotherapy.  Abnormalities on imaging were not visualized on CT imaging alone or with an MRCP of  the liver and therefore a PET scan is required.

## 2017-05-04 DIAGNOSIS — C83.30 DIFFUSE LARGE B-CELL LYMPHOMA, UNSPECIFIED BODY REGION (HCC): Primary | ICD-10-CM

## 2017-05-08 DIAGNOSIS — C83.30 DIFFUSE LARGE B-CELL LYMPHOMA, UNSPECIFIED BODY REGION (HCC): ICD-10-CM

## 2017-05-09 ENCOUNTER — OFFICE VISIT (OUTPATIENT)
Dept: ONCOLOGY | Facility: CLINIC | Age: 82
End: 2017-05-09

## 2017-05-09 ENCOUNTER — INFUSION (OUTPATIENT)
Dept: ONCOLOGY | Facility: HOSPITAL | Age: 82
End: 2017-05-09

## 2017-05-09 VITALS
RESPIRATION RATE: 16 BRPM | BODY MASS INDEX: 21.76 KG/M2 | HEIGHT: 66 IN | SYSTOLIC BLOOD PRESSURE: 116 MMHG | DIASTOLIC BLOOD PRESSURE: 62 MMHG | OXYGEN SATURATION: 97 % | HEART RATE: 66 BPM | TEMPERATURE: 98 F | WEIGHT: 135.4 LBS

## 2017-05-09 VITALS — DIASTOLIC BLOOD PRESSURE: 57 MMHG | HEART RATE: 70 BPM | SYSTOLIC BLOOD PRESSURE: 120 MMHG

## 2017-05-09 DIAGNOSIS — C83.30 DIFFUSE LARGE B-CELL LYMPHOMA, UNSPECIFIED BODY REGION (HCC): Primary | ICD-10-CM

## 2017-05-09 DIAGNOSIS — C83.30 DIFFUSE LARGE B-CELL LYMPHOMA, UNSPECIFIED BODY REGION (HCC): ICD-10-CM

## 2017-05-09 LAB
ALBUMIN SERPL-MCNC: 3.9 G/DL (ref 3.5–5.2)
ALBUMIN/GLOB SERPL: 1.4 G/DL (ref 1.1–2.4)
ALP SERPL-CCNC: 68 U/L (ref 38–116)
ALT SERPL W P-5'-P-CCNC: 18 U/L (ref 0–33)
ANION GAP SERPL CALCULATED.3IONS-SCNC: 14.9 MMOL/L
AST SERPL-CCNC: 24 U/L (ref 0–32)
BASOPHILS # BLD AUTO: 0.05 10*3/MM3 (ref 0–0.1)
BASOPHILS NFR BLD AUTO: 0.7 % (ref 0–1.1)
BILIRUB SERPL-MCNC: 0.3 MG/DL (ref 0.1–1.2)
BUN BLD-MCNC: 14 MG/DL (ref 6–20)
BUN/CREAT SERPL: 20.3 (ref 7.3–30)
CALCIUM SPEC-SCNC: 9.3 MG/DL (ref 8.5–10.2)
CHLORIDE SERPL-SCNC: 100 MMOL/L (ref 98–107)
CO2 SERPL-SCNC: 25.1 MMOL/L (ref 22–29)
CREAT BLD-MCNC: 0.69 MG/DL (ref 0.6–1.1)
DEPRECATED RDW RBC AUTO: 54 FL (ref 37–49)
EOSINOPHIL # BLD AUTO: 0.16 10*3/MM3 (ref 0–0.36)
EOSINOPHIL NFR BLD AUTO: 2.3 % (ref 1–5)
ERYTHROCYTE [DISTWIDTH] IN BLOOD BY AUTOMATED COUNT: 16.7 % (ref 11.7–14.5)
GFR SERPL CREATININE-BSD FRML MDRD: 81 ML/MIN/1.73
GLOBULIN UR ELPH-MCNC: 2.8 GM/DL (ref 1.8–3.5)
GLUCOSE BLD-MCNC: 81 MG/DL (ref 74–124)
HCT VFR BLD AUTO: 36.5 % (ref 34–45)
HGB BLD-MCNC: 12.1 G/DL (ref 11.5–14.9)
IMM GRANULOCYTES # BLD: 0.02 10*3/MM3 (ref 0–0.03)
IMM GRANULOCYTES NFR BLD: 0.3 % (ref 0–0.5)
LYMPHOCYTES # BLD AUTO: 2.3 10*3/MM3 (ref 1–3.5)
LYMPHOCYTES NFR BLD AUTO: 33.4 % (ref 20–49)
MCH RBC QN AUTO: 29.4 PG (ref 27–33)
MCHC RBC AUTO-ENTMCNC: 33.2 G/DL (ref 32–35)
MCV RBC AUTO: 88.6 FL (ref 83–97)
MONOCYTES # BLD AUTO: 0.49 10*3/MM3 (ref 0.25–0.8)
MONOCYTES NFR BLD AUTO: 7.1 % (ref 4–12)
NEUTROPHILS # BLD AUTO: 3.87 10*3/MM3 (ref 1.5–7)
NEUTROPHILS NFR BLD AUTO: 56.2 % (ref 39–75)
NRBC BLD MANUAL-RTO: 0 /100 WBC (ref 0–0)
PLATELET # BLD AUTO: 217 10*3/MM3 (ref 150–375)
PMV BLD AUTO: 8.9 FL (ref 8.9–12.1)
POTASSIUM BLD-SCNC: 3.4 MMOL/L (ref 3.5–4.7)
PROT SERPL-MCNC: 6.7 G/DL (ref 6.3–8)
RBC # BLD AUTO: 4.12 10*6/MM3 (ref 3.9–5)
SODIUM BLD-SCNC: 140 MMOL/L (ref 134–145)
WBC NRBC COR # BLD: 6.89 10*3/MM3 (ref 4–10)

## 2017-05-09 PROCEDURE — 96411 CHEMO IV PUSH ADDL DRUG: CPT | Performed by: INTERNAL MEDICINE

## 2017-05-09 PROCEDURE — 25010000002 HYDROCORTISONE SODIUM SUCCINATE 100 MG RECONSTITUTED SOLUTION: Performed by: INTERNAL MEDICINE

## 2017-05-09 PROCEDURE — 85025 COMPLETE CBC W/AUTO DIFF WBC: CPT

## 2017-05-09 PROCEDURE — 25010000002 BENDAMUSTINE HCL 100 MG/4ML SOLUTION 4 ML VIAL: Performed by: INTERNAL MEDICINE

## 2017-05-09 PROCEDURE — 96413 CHEMO IV INFUSION 1 HR: CPT | Performed by: INTERNAL MEDICINE

## 2017-05-09 PROCEDURE — 25010000002 DIPHENHYDRAMINE PER 50 MG: Performed by: INTERNAL MEDICINE

## 2017-05-09 PROCEDURE — 80053 COMPREHEN METABOLIC PANEL: CPT

## 2017-05-09 PROCEDURE — 99214 OFFICE O/P EST MOD 30 MIN: CPT | Performed by: INTERNAL MEDICINE

## 2017-05-09 PROCEDURE — 25010000002 PALONOSETRON PER 25 MCG: Performed by: INTERNAL MEDICINE

## 2017-05-09 PROCEDURE — 25010000002 RITUXIMAB 10 MG/ML SOLUTION 50 ML VIAL: Performed by: INTERNAL MEDICINE

## 2017-05-09 PROCEDURE — 25010000002 DEXAMETHASONE PER 1 MG: Performed by: INTERNAL MEDICINE

## 2017-05-09 PROCEDURE — 25010000002 RITUXIMAB 10 MG/ML SOLUTION 10 ML VIAL: Performed by: INTERNAL MEDICINE

## 2017-05-09 PROCEDURE — 96375 TX/PRO/DX INJ NEW DRUG ADDON: CPT | Performed by: INTERNAL MEDICINE

## 2017-05-09 PROCEDURE — 96415 CHEMO IV INFUSION ADDL HR: CPT | Performed by: INTERNAL MEDICINE

## 2017-05-09 RX ORDER — PALONOSETRON 0.05 MG/ML
0.25 INJECTION, SOLUTION INTRAVENOUS ONCE
Status: CANCELLED | OUTPATIENT
Start: 2017-05-09

## 2017-05-09 RX ORDER — MEPERIDINE HYDROCHLORIDE 50 MG/ML
25 INJECTION INTRAMUSCULAR; INTRAVENOUS; SUBCUTANEOUS
Status: CANCELLED | OUTPATIENT
Start: 2017-06-06

## 2017-05-09 RX ORDER — PALONOSETRON 0.05 MG/ML
0.25 INJECTION, SOLUTION INTRAVENOUS ONCE
Status: COMPLETED | OUTPATIENT
Start: 2017-05-09 | End: 2017-05-09

## 2017-05-09 RX ORDER — FAMOTIDINE 10 MG/ML
20 INJECTION, SOLUTION INTRAVENOUS ONCE
Status: CANCELLED | OUTPATIENT
Start: 2017-06-06

## 2017-05-09 RX ORDER — ACETAMINOPHEN 325 MG/1
650 TABLET ORAL ONCE
Status: CANCELLED | OUTPATIENT
Start: 2017-05-09

## 2017-05-09 RX ORDER — SODIUM CHLORIDE 9 MG/ML
250 INJECTION, SOLUTION INTRAVENOUS ONCE
Status: CANCELLED | OUTPATIENT
Start: 2017-06-06

## 2017-05-09 RX ORDER — ACETAMINOPHEN 325 MG/1
650 TABLET ORAL ONCE
Status: COMPLETED | OUTPATIENT
Start: 2017-05-09 | End: 2017-05-09

## 2017-05-09 RX ORDER — SODIUM CHLORIDE 9 MG/ML
250 INJECTION, SOLUTION INTRAVENOUS ONCE
Status: CANCELLED | OUTPATIENT
Start: 2017-06-07

## 2017-05-09 RX ORDER — DIPHENHYDRAMINE HYDROCHLORIDE 50 MG/ML
50 INJECTION INTRAMUSCULAR; INTRAVENOUS AS NEEDED
Status: CANCELLED | OUTPATIENT
Start: 2017-06-06

## 2017-05-09 RX ORDER — DIPHENHYDRAMINE HYDROCHLORIDE 50 MG/ML
50 INJECTION INTRAMUSCULAR; INTRAVENOUS AS NEEDED
Status: CANCELLED | OUTPATIENT
Start: 2017-05-09

## 2017-05-09 RX ORDER — FAMOTIDINE 10 MG/ML
20 INJECTION, SOLUTION INTRAVENOUS ONCE
Status: COMPLETED | OUTPATIENT
Start: 2017-05-09 | End: 2017-05-09

## 2017-05-09 RX ORDER — SODIUM CHLORIDE 9 MG/ML
250 INJECTION, SOLUTION INTRAVENOUS ONCE
Status: CANCELLED | OUTPATIENT
Start: 2017-05-09

## 2017-05-09 RX ORDER — ACETAMINOPHEN 325 MG/1
650 TABLET ORAL ONCE
Status: CANCELLED | OUTPATIENT
Start: 2017-06-06

## 2017-05-09 RX ORDER — SODIUM CHLORIDE 9 MG/ML
250 INJECTION, SOLUTION INTRAVENOUS ONCE
Status: CANCELLED | OUTPATIENT
Start: 2017-05-10

## 2017-05-09 RX ORDER — FAMOTIDINE 10 MG/ML
20 INJECTION, SOLUTION INTRAVENOUS AS NEEDED
Status: CANCELLED | OUTPATIENT
Start: 2017-05-09

## 2017-05-09 RX ORDER — FAMOTIDINE 10 MG/ML
20 INJECTION, SOLUTION INTRAVENOUS AS NEEDED
Status: CANCELLED | OUTPATIENT
Start: 2017-06-06

## 2017-05-09 RX ORDER — FAMOTIDINE 10 MG/ML
20 INJECTION, SOLUTION INTRAVENOUS ONCE
Status: CANCELLED | OUTPATIENT
Start: 2017-05-09

## 2017-05-09 RX ORDER — PALONOSETRON 0.05 MG/ML
0.25 INJECTION, SOLUTION INTRAVENOUS ONCE
Status: CANCELLED | OUTPATIENT
Start: 2017-06-06

## 2017-05-09 RX ORDER — SODIUM CHLORIDE 9 MG/ML
250 INJECTION, SOLUTION INTRAVENOUS ONCE
Status: COMPLETED | OUTPATIENT
Start: 2017-05-09 | End: 2017-05-09

## 2017-05-09 RX ORDER — MEPERIDINE HYDROCHLORIDE 50 MG/ML
25 INJECTION INTRAMUSCULAR; INTRAVENOUS; SUBCUTANEOUS
Status: CANCELLED | OUTPATIENT
Start: 2017-05-09

## 2017-05-09 RX ADMIN — BENDAMUSTINE HYDROCHLORIDE 150 MG: 25 INJECTION, SOLUTION INTRAVENOUS at 12:21

## 2017-05-09 RX ADMIN — FAMOTIDINE 20 MG: 10 INJECTION, SOLUTION INTRAVENOUS at 11:04

## 2017-05-09 RX ADMIN — PALONOSETRON HYDROCHLORIDE 0.25 MG: 0.25 INJECTION INTRAVENOUS at 11:04

## 2017-05-09 RX ADMIN — DEXAMETHASONE SODIUM PHOSPHATE 12 MG: 10 INJECTION INTRAMUSCULAR; INTRAVENOUS at 11:04

## 2017-05-09 RX ADMIN — DIPHENHYDRAMINE HYDROCHLORIDE 50 MG: 50 INJECTION, SOLUTION INTRAMUSCULAR; INTRAVENOUS at 11:32

## 2017-05-09 RX ADMIN — ACETAMINOPHEN 650 MG: 325 TABLET ORAL at 11:04

## 2017-05-09 RX ADMIN — SODIUM CHLORIDE 250 ML: 900 INJECTION, SOLUTION INTRAVENOUS at 11:04

## 2017-05-09 RX ADMIN — RITUXIMAB 630 MG: 10 INJECTION, SOLUTION INTRAVENOUS at 12:37

## 2017-05-09 RX ADMIN — HYDROCORTISONE SODIUM SUCCINATE 100 MG: 100 INJECTION, POWDER, FOR SOLUTION INTRAMUSCULAR; INTRAVENOUS at 11:04

## 2017-05-10 ENCOUNTER — INFUSION (OUTPATIENT)
Dept: ONCOLOGY | Facility: HOSPITAL | Age: 82
End: 2017-05-10

## 2017-05-10 VITALS
TEMPERATURE: 98 F | DIASTOLIC BLOOD PRESSURE: 68 MMHG | HEART RATE: 101 BPM | BODY MASS INDEX: 22.75 KG/M2 | SYSTOLIC BLOOD PRESSURE: 158 MMHG | WEIGHT: 138.8 LBS

## 2017-05-10 DIAGNOSIS — C83.30 DIFFUSE LARGE B-CELL LYMPHOMA, UNSPECIFIED BODY REGION (HCC): Primary | ICD-10-CM

## 2017-05-10 PROCEDURE — 25010000002 BENDAMUSTINE HCL 100 MG/4ML SOLUTION 4 ML VIAL: Performed by: INTERNAL MEDICINE

## 2017-05-10 PROCEDURE — 96375 TX/PRO/DX INJ NEW DRUG ADDON: CPT | Performed by: INTERNAL MEDICINE

## 2017-05-10 PROCEDURE — 25010000002 DEXAMETHASONE SODIUM PHOSPHATE 10 MG/ML SOLUTION 1 ML VIAL: Performed by: INTERNAL MEDICINE

## 2017-05-10 PROCEDURE — 96409 CHEMO IV PUSH SNGL DRUG: CPT | Performed by: INTERNAL MEDICINE

## 2017-05-10 RX ORDER — SODIUM CHLORIDE 9 MG/ML
250 INJECTION, SOLUTION INTRAVENOUS ONCE
Status: COMPLETED | OUTPATIENT
Start: 2017-05-10 | End: 2017-05-10

## 2017-05-10 RX ADMIN — BENDAMUSTINE HYDROCHLORIDE 150 MG: 25 INJECTION, SOLUTION INTRAVENOUS at 14:11

## 2017-05-10 RX ADMIN — DEXAMETHASONE SODIUM PHOSPHATE 12 MG: 10 INJECTION, SOLUTION INTRAMUSCULAR; INTRAVENOUS at 13:26

## 2017-05-10 RX ADMIN — SODIUM CHLORIDE 250 ML: 900 INJECTION, SOLUTION INTRAVENOUS at 13:16

## 2017-05-11 ENCOUNTER — DOCUMENTATION (OUTPATIENT)
Dept: ONCOLOGY | Facility: CLINIC | Age: 82
End: 2017-05-11

## 2017-05-11 ENCOUNTER — TELEPHONE (OUTPATIENT)
Dept: ONCOLOGY | Facility: CLINIC | Age: 82
End: 2017-05-11

## 2017-05-22 ENCOUNTER — TELEPHONE (OUTPATIENT)
Dept: ONCOLOGY | Facility: CLINIC | Age: 82
End: 2017-05-22

## 2017-05-22 DIAGNOSIS — R25.2 CRAMP AND SPASM: Primary | ICD-10-CM

## 2017-05-23 ENCOUNTER — INFUSION (OUTPATIENT)
Dept: ONCOLOGY | Facility: HOSPITAL | Age: 82
End: 2017-05-23

## 2017-05-23 ENCOUNTER — OFFICE VISIT (OUTPATIENT)
Dept: ONCOLOGY | Facility: CLINIC | Age: 82
End: 2017-05-23

## 2017-05-23 VITALS
RESPIRATION RATE: 14 BRPM | TEMPERATURE: 97.7 F | DIASTOLIC BLOOD PRESSURE: 76 MMHG | HEIGHT: 66 IN | HEART RATE: 70 BPM | OXYGEN SATURATION: 97 % | BODY MASS INDEX: 22.02 KG/M2 | SYSTOLIC BLOOD PRESSURE: 148 MMHG | WEIGHT: 137 LBS

## 2017-05-23 DIAGNOSIS — C83.30 DIFFUSE LARGE B-CELL LYMPHOMA, UNSPECIFIED BODY REGION (HCC): Primary | ICD-10-CM

## 2017-05-23 DIAGNOSIS — Z45.2 FITTING AND ADJUSTMENT OF VASCULAR CATHETER: ICD-10-CM

## 2017-05-23 DIAGNOSIS — R25.2 CRAMP AND SPASM: ICD-10-CM

## 2017-05-23 PROBLEM — R06.02 SHORT OF BREATH ON EXERTION: Status: RESOLVED | Noted: 2017-03-03 | Resolved: 2017-05-23

## 2017-05-23 LAB
ANION GAP SERPL CALCULATED.3IONS-SCNC: 14.2 MMOL/L
BASOPHILS # BLD AUTO: 0.06 10*3/MM3 (ref 0–0.1)
BASOPHILS NFR BLD AUTO: 0.8 % (ref 0–1.1)
BUN BLD-MCNC: 14 MG/DL (ref 6–20)
BUN/CREAT SERPL: 20 (ref 7.3–30)
CALCIUM SPEC-SCNC: 8.9 MG/DL (ref 8.5–10.2)
CHLORIDE SERPL-SCNC: 100 MMOL/L (ref 98–107)
CO2 SERPL-SCNC: 24.8 MMOL/L (ref 22–29)
CREAT BLD-MCNC: 0.7 MG/DL (ref 0.6–1.1)
DEPRECATED RDW RBC AUTO: 51.6 FL (ref 37–49)
EOSINOPHIL # BLD AUTO: 0.07 10*3/MM3 (ref 0–0.36)
EOSINOPHIL NFR BLD AUTO: 0.9 % (ref 1–5)
ERYTHROCYTE [DISTWIDTH] IN BLOOD BY AUTOMATED COUNT: 15.6 % (ref 11.7–14.5)
GFR SERPL CREATININE-BSD FRML MDRD: 80 ML/MIN/1.73
GLUCOSE BLD-MCNC: 116 MG/DL (ref 74–124)
HCT VFR BLD AUTO: 36.4 % (ref 34–45)
HGB BLD-MCNC: 12.1 G/DL (ref 11.5–14.9)
IMM GRANULOCYTES # BLD: 0.05 10*3/MM3 (ref 0–0.03)
IMM GRANULOCYTES NFR BLD: 0.7 % (ref 0–0.5)
LDH SERPL-CCNC: 186 U/L (ref 99–259)
LYMPHOCYTES # BLD AUTO: 0.3 10*3/MM3 (ref 1–3.5)
LYMPHOCYTES NFR BLD AUTO: 4 % (ref 20–49)
MCH RBC QN AUTO: 29.9 PG (ref 27–33)
MCHC RBC AUTO-ENTMCNC: 33.2 G/DL (ref 32–35)
MCV RBC AUTO: 89.9 FL (ref 83–97)
MONOCYTES # BLD AUTO: 0.61 10*3/MM3 (ref 0.25–0.8)
MONOCYTES NFR BLD AUTO: 8.1 % (ref 4–12)
NEUTROPHILS # BLD AUTO: 6.42 10*3/MM3 (ref 1.5–7)
NEUTROPHILS NFR BLD AUTO: 85.5 % (ref 39–75)
NRBC BLD MANUAL-RTO: 0 /100 WBC (ref 0–0)
PLATELET # BLD AUTO: 188 10*3/MM3 (ref 150–375)
PMV BLD AUTO: 9.1 FL (ref 8.9–12.1)
POTASSIUM BLD-SCNC: 3.7 MMOL/L (ref 3.5–4.7)
RBC # BLD AUTO: 4.05 10*6/MM3 (ref 3.9–5)
SODIUM BLD-SCNC: 139 MMOL/L (ref 134–145)
WBC NRBC COR # BLD: 7.51 10*3/MM3 (ref 4–10)

## 2017-05-23 PROCEDURE — 25010000002 HEPARIN FLUSH (PORCINE) 100 UNIT/ML SOLUTION: Performed by: NURSE PRACTITIONER

## 2017-05-23 PROCEDURE — 96523 IRRIG DRUG DELIVERY DEVICE: CPT | Performed by: NURSE PRACTITIONER

## 2017-05-23 PROCEDURE — 99213 OFFICE O/P EST LOW 20 MIN: CPT | Performed by: NURSE PRACTITIONER

## 2017-05-23 PROCEDURE — 80048 BASIC METABOLIC PNL TOTAL CA: CPT

## 2017-05-23 PROCEDURE — 85025 COMPLETE CBC W/AUTO DIFF WBC: CPT

## 2017-05-23 PROCEDURE — 83615 LACTATE (LD) (LDH) ENZYME: CPT | Performed by: NURSE PRACTITIONER

## 2017-05-23 RX ORDER — SODIUM CHLORIDE 0.9 % (FLUSH) 0.9 %
10 SYRINGE (ML) INJECTION AS NEEDED
Status: CANCELLED | OUTPATIENT
Start: 2017-05-23

## 2017-05-23 RX ORDER — SODIUM CHLORIDE 0.9 % (FLUSH) 0.9 %
10 SYRINGE (ML) INJECTION AS NEEDED
Status: DISCONTINUED | OUTPATIENT
Start: 2017-05-23 | End: 2017-05-23 | Stop reason: HOSPADM

## 2017-05-23 RX ADMIN — SODIUM CHLORIDE, PRESERVATIVE FREE 500 UNITS: 5 INJECTION INTRAVENOUS at 12:41

## 2017-05-23 RX ADMIN — Medication 10 ML: at 12:41

## 2017-05-27 ENCOUNTER — DOCUMENTATION (OUTPATIENT)
Dept: ONCOLOGY | Facility: CLINIC | Age: 82
End: 2017-05-27

## 2017-06-06 ENCOUNTER — INFUSION (OUTPATIENT)
Dept: ONCOLOGY | Facility: HOSPITAL | Age: 82
End: 2017-06-06

## 2017-06-06 ENCOUNTER — APPOINTMENT (OUTPATIENT)
Dept: ONCOLOGY | Facility: CLINIC | Age: 82
End: 2017-06-06

## 2017-06-06 ENCOUNTER — APPOINTMENT (OUTPATIENT)
Dept: ONCOLOGY | Facility: HOSPITAL | Age: 82
End: 2017-06-06

## 2017-06-06 ENCOUNTER — OFFICE VISIT (OUTPATIENT)
Dept: ONCOLOGY | Facility: CLINIC | Age: 82
End: 2017-06-06

## 2017-06-06 VITALS — SYSTOLIC BLOOD PRESSURE: 125 MMHG | DIASTOLIC BLOOD PRESSURE: 66 MMHG | HEART RATE: 62 BPM

## 2017-06-06 VITALS
OXYGEN SATURATION: 99 % | SYSTOLIC BLOOD PRESSURE: 128 MMHG | WEIGHT: 134.4 LBS | RESPIRATION RATE: 16 BRPM | HEIGHT: 66 IN | BODY MASS INDEX: 21.6 KG/M2 | DIASTOLIC BLOOD PRESSURE: 60 MMHG | HEART RATE: 66 BPM | TEMPERATURE: 97.4 F

## 2017-06-06 DIAGNOSIS — C83.30 DIFFUSE LARGE B-CELL LYMPHOMA, UNSPECIFIED BODY REGION (HCC): Primary | ICD-10-CM

## 2017-06-06 LAB
ALBUMIN SERPL-MCNC: 3.8 G/DL (ref 3.5–5.2)
ALBUMIN/GLOB SERPL: 1.2 G/DL (ref 1.1–2.4)
ALP SERPL-CCNC: 82 U/L (ref 38–116)
ALT SERPL W P-5'-P-CCNC: 19 U/L (ref 0–33)
ANION GAP SERPL CALCULATED.3IONS-SCNC: 14.9 MMOL/L
AST SERPL-CCNC: 23 U/L (ref 0–32)
BASOPHILS # BLD AUTO: 0.08 10*3/MM3 (ref 0–0.1)
BASOPHILS NFR BLD AUTO: 0.9 % (ref 0–1.1)
BILIRUB SERPL-MCNC: 0.2 MG/DL (ref 0.1–1.2)
BUN BLD-MCNC: 19 MG/DL (ref 6–20)
BUN/CREAT SERPL: 27.1 (ref 7.3–30)
CALCIUM SPEC-SCNC: 9 MG/DL (ref 8.5–10.2)
CHLORIDE SERPL-SCNC: 100 MMOL/L (ref 98–107)
CO2 SERPL-SCNC: 23.1 MMOL/L (ref 22–29)
CREAT BLD-MCNC: 0.7 MG/DL (ref 0.6–1.1)
DEPRECATED RDW RBC AUTO: 47.5 FL (ref 37–49)
EOSINOPHIL # BLD AUTO: 0.2 10*3/MM3 (ref 0–0.36)
EOSINOPHIL NFR BLD AUTO: 2.2 % (ref 1–5)
ERYTHROCYTE [DISTWIDTH] IN BLOOD BY AUTOMATED COUNT: 14.7 % (ref 11.7–14.5)
GFR SERPL CREATININE-BSD FRML MDRD: 80 ML/MIN/1.73
GLOBULIN UR ELPH-MCNC: 3.1 GM/DL (ref 1.8–3.5)
GLUCOSE BLD-MCNC: 91 MG/DL (ref 74–124)
HCT VFR BLD AUTO: 36.5 % (ref 34–45)
HGB BLD-MCNC: 12.2 G/DL (ref 11.5–14.9)
HOLD SPECIMEN: NORMAL
IMM GRANULOCYTES # BLD: 0.05 10*3/MM3 (ref 0–0.03)
IMM GRANULOCYTES NFR BLD: 0.5 % (ref 0–0.5)
LYMPHOCYTES # BLD AUTO: 0.8 10*3/MM3 (ref 1–3.5)
LYMPHOCYTES NFR BLD AUTO: 8.8 % (ref 20–49)
MCH RBC QN AUTO: 29.4 PG (ref 27–33)
MCHC RBC AUTO-ENTMCNC: 33.4 G/DL (ref 32–35)
MCV RBC AUTO: 88 FL (ref 83–97)
MONOCYTES # BLD AUTO: 0.77 10*3/MM3 (ref 0.25–0.8)
MONOCYTES NFR BLD AUTO: 8.4 % (ref 4–12)
NEUTROPHILS # BLD AUTO: 7.23 10*3/MM3 (ref 1.5–7)
NEUTROPHILS NFR BLD AUTO: 79.2 % (ref 39–75)
NRBC BLD MANUAL-RTO: 0 /100 WBC (ref 0–0)
PLATELET # BLD AUTO: 245 10*3/MM3 (ref 150–375)
PMV BLD AUTO: 8.9 FL (ref 8.9–12.1)
POTASSIUM BLD-SCNC: 3.7 MMOL/L (ref 3.5–4.7)
PROT SERPL-MCNC: 6.9 G/DL (ref 6.3–8)
RBC # BLD AUTO: 4.15 10*6/MM3 (ref 3.9–5)
SODIUM BLD-SCNC: 138 MMOL/L (ref 134–145)
WBC NRBC COR # BLD: 9.13 10*3/MM3 (ref 4–10)

## 2017-06-06 PROCEDURE — 36415 COLL VENOUS BLD VENIPUNCTURE: CPT

## 2017-06-06 PROCEDURE — 80053 COMPREHEN METABOLIC PANEL: CPT

## 2017-06-06 PROCEDURE — 99214 OFFICE O/P EST MOD 30 MIN: CPT | Performed by: NURSE PRACTITIONER

## 2017-06-06 PROCEDURE — 85025 COMPLETE CBC W/AUTO DIFF WBC: CPT

## 2017-06-06 RX ORDER — FAMOTIDINE 10 MG/ML
20 INJECTION, SOLUTION INTRAVENOUS ONCE
Status: COMPLETED | OUTPATIENT
Start: 2017-06-06 | End: 2017-06-06

## 2017-06-06 RX ORDER — ACETAMINOPHEN 325 MG/1
650 TABLET ORAL ONCE
Status: COMPLETED | OUTPATIENT
Start: 2017-06-06 | End: 2017-06-06

## 2017-06-06 RX ORDER — PALONOSETRON 0.05 MG/ML
0.25 INJECTION, SOLUTION INTRAVENOUS ONCE
Status: COMPLETED | OUTPATIENT
Start: 2017-06-06 | End: 2017-06-06

## 2017-06-06 RX ORDER — SODIUM CHLORIDE 9 MG/ML
250 INJECTION, SOLUTION INTRAVENOUS ONCE
Status: COMPLETED | OUTPATIENT
Start: 2017-06-06 | End: 2017-06-06

## 2017-06-06 RX ADMIN — HYDROCORTISONE SODIUM SUCCINATE 100 MG: 100 INJECTION, POWDER, FOR SOLUTION INTRAMUSCULAR; INTRAVENOUS at 11:35

## 2017-06-06 RX ADMIN — DEXAMETHASONE SODIUM PHOSPHATE 12 MG: 10 INJECTION INTRAMUSCULAR; INTRAVENOUS at 11:57

## 2017-06-06 RX ADMIN — SODIUM CHLORIDE 250 ML: 900 INJECTION, SOLUTION INTRAVENOUS at 11:33

## 2017-06-06 RX ADMIN — RITUXIMAB 630 MG: 10 INJECTION, SOLUTION INTRAVENOUS at 12:52

## 2017-06-06 RX ADMIN — PALONOSETRON HYDROCHLORIDE 0.25 MG: 0.25 INJECTION INTRAVENOUS at 11:37

## 2017-06-06 RX ADMIN — FAMOTIDINE 20 MG: 10 INJECTION, SOLUTION INTRAVENOUS at 11:36

## 2017-06-06 RX ADMIN — ACETAMINOPHEN 650 MG: 325 TABLET ORAL at 11:34

## 2017-06-06 RX ADMIN — BENDAMUSTINE HYDROCHLORIDE 150 MG: 25 INJECTION, SOLUTION INTRAVENOUS at 12:37

## 2017-06-06 RX ADMIN — DIPHENHYDRAMINE HYDROCHLORIDE 50 MG: 50 INJECTION, SOLUTION INTRAMUSCULAR; INTRAVENOUS at 11:39

## 2017-06-06 NOTE — PROGRESS NOTES
REASON FOR FOLLOW-UP:    1.  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  IPI 4 out of 5 = high risk.  2. Therapy with miniCHOP initiated on 5/5/2016, complete as of 8/19/2016.  (In retrospect, it appears she did not receive Rituxan).  Complete metabolic response to therapy by PET imaging.  3.  PET imaging on 3/22/2017 consistent with recurrence.  See below.    4.  Initiation of Rituxan weekly for 4 weeks on 4/3/2017.  Complete as of 4/25/2017.  5.  Initiation of bendamustine and Rituxan on 5/9/2017.    ONCOLOGIC HISTORY:     Diffuse large B cell lymphoma    4/14/2016 Initial Diagnosis    Diffuse large B cell lymphoma by ultrasound-guided left supraclavicular lymph node biopsy.  Ki-67>95%.      4/28/2016 Imaging    PET scan:  There is fairly extensive lymphoma as described in detail  above. There are areas of intense metabolic activity localizing to the  spinal canal at the midthoracic level as well as low lumbar and sacral  levels. There is remarkably little detectable abnormality at these  levels by CT. MRI would be the best means for further delineation of  anatomic pathology. The degree of activity certainly could indicate an  underlying mass and cord compression should be evaluated. Furthermore,  there is noted a very small nodular focus of uptake within the upper  inner quadrant of the left breast which appears to correspond to tiny  nodular foci seen at concurrent CT imaging. The finding is nonspecific,  however should be closely followed after appropriate therapy for  lymphoma to ensure resolution and to exclude a possible breast  malignancy.      4/29/2016 Surgery    Mediport placed by Dr. Mihir Arrieta.      5/5/2016 - 8/19/2016 Chemotherapy    R-miniCHOP      9/9/2016 Imaging    PET scan:  There has been a dramatic interval improvement. There has been  resolution of all of the previously seen intensely hypermetabolic  lesions involving the thorax,  spine, retroperitoneum, and pelvis.      There is a new focus of hypermetabolic activity at the mid sigmoid colon  where there is mild acute diverticulitis, image 284.      3/22/2017 Imaging    PET scan:  IMPRESSION:  1. The tiny hypermetabolic nodes at the left axilla and left internal  mammary chain are similar to the hypermetabolic nodes seen on the PET/CT  from 04/28/2016. This suggests recurrence. The new hypermetabolic focus  within the left T2 lamina is concerning for a metastasis as well.  2. There is unusual hypermetabolic activity within the central aspect of  the right hepatic lobe corresponds to a much larger right portal vein  than on previous examinations. This suggests right portal vein  thrombosis. Further evaluation with contrast-enhanced CT of the abdomen  is recommended.  3. The symmetrical hypermetabolic activity throughout both lung fields  is of uncertain etiology as no airspace consolidations are present on  the corresponding CT. Extensive pneumonitis may have diffuse lung  activity, but the appearance on CT is not suggestive of pneumonitis. The  etiology of this finding is uncertain.      3/24/2017 Imaging    IMPRESSION:  Mildly enlarged bilateral axillary and mediastinal and left  internal mammary chain lymph nodes that showed mild hypermetabolism on a  recent PET/CT study consistent with recurrent lymphoma. No lung  abnormalities are identified to account for the low level abnormal  uptake noted on the PET/CT study. There is also no evidence of a lytic  or blastic lesion in the left lamina of T2 were the PET/CT study showed  focal increased activity. This does not exclude metastatic disease in  this location. Images of the abdomen and pelvis show no evidence of  recurrent lymphoma. There is no evidence of portal vein thrombosis.      4/3/2017 - 4/25/2017 Chemotherapy    OP LYMPHOMA (CLL) RiTUXimab (Weekly X 4)        5/9/2017 -  Chemotherapy    OP LYMPHOMA Bendamustine 90 mg/m2 / RiTUXimab  "375 mg/m2           HISTORY OF PRESENT ILLNESS:  Guerita De La Fuente is a 84 y.o. female who returns today for follow up, due for cycle 2 of Treanda/Rituxan. She has had some various GI issues since last treatment. After cycle 1, she had difficulty with constipation, improving with milk of magnesia and fleet's enema.     She then had what she describes as a \"gall bladder\" attack. She states she has had similar episodes dating back to her early 20s.  She is quick to point out though that she has not had an attack in over 10 years.  She describes it as systemic heart attack with burning in her chest and abdominal cramping.  She has Lomotil prescribed by Dr. Alvarez which does relieve her symptoms.  She states that this happened after she ate kale.  She seems to be very aware of what foods trigger it, pointing out that apples can do it to.  She is feeling better now reporting normal bowel function and no abdominal discomfort.    Her only other concern is feeling like her left rib cage is getting out more.  She states it is more noticeable when she stands up.  This prompted examination which reveals curvature of her spine to the left and her entire torso slightly cocked to the left.  She denies history of scoliosis but blames this on a lifetime of carrying things on her right side and possibly poor posture.  She has also lost a bit of weight over the last year and has more sagging skin/fat noticeable.     Her weight has been fairly stable for some time now, though she does note her appetite diminishes right after chemotherapy as is typical. She continues prednisone 5 mg twice daily which she tolerates well with good appetite and stable weight.     Otherwise she denies further concerns at this time.    PAST MEDICAL, SURGICAL, FAMILY, AND SOCIAL HISTORIES were reviewed with the patient and in the electronic medical record, and were updated if indicated.    ALLERGIES:  Allergies   Allergen Reactions   • Allopurinol    • " "Codeine GI Intolerance   • Levaquin [Levofloxacin]    • Shellfish-Derived Products Other (See Comments)     \"CRABS\" \"I CAN'T REMEMBER WHAT THE REACTION WAS\"   • Zofran [Ondansetron Hcl] Hives   • Penicillins Rash   • Sulfa Antibiotics Rash       MEDICATIONS:  The medication list has been reviewed with the patient by the medical assistant, and the list has been updated in the electronic medical record, which I reviewed.  Medication dosages and frequencies were confirmed to be accurate.    REVIEW OF SYSTEMS:  PAIN:  See Vital Signs below.  GENERAL:  See HPI  SKIN: No rash  HEME/LYMPH:  No abnormal bleeding.    EYES:  No vision changes or diplopia.  ENT:  No sore throat or difficulty swallowing.Tongue swelling.  RESPIRATORY:  No cough, shortness of breath, hemoptysis, or wheezing.  CARDIOVASCULAR:  No chest pain, palpitations, orthopnea.  Mild dyspnea on exertion.  GASTROINTESTINAL:  See history of present illness  GENITOURINARY:  No dysuria or hematuria.  MUSCULOSKELETAL:  Occasional right hip and back pain.  NEUROLOGIC:  Grade 1-2 peripheral neuropathy, stable.  PSYCHIATRIC:  No depression, anxiety, or mood changes.    There were no vitals filed for this visit.  ECOG 1    PHYSICAL EXAMINATION:  GENERAL:  Well-developed well-nourished female; awake, alert and oriented, in no acute distress.  SKIN:  No rash or nonhealing lesions  HEAD:  Normocephalic, atraumatic.  EYES:  Pupils equal, round and reactive to light.  Extraocular movements intact.  Conjunctivae normal.  EARS:  Hearing intact.  NOSE:  Septum midline.  No excoriations or nasal discharge.  MOUTH:  No stomatitis or ulcers.  Lips are normal.  No gingival erythema.  THROAT:  Oropharynx without lesions or exudates.  NECK:  Supple with good range of motion; no thyromegaly or masses; no JVD or bruits.  CHEST:  Lungs are clear to auscultation bilaterally.  No wheezes, rales, or rhonchi.  A Mediport is present in the right subclavian area that is benign in " appearance.  HEART:  Regular rate and rhythm.  No murmurs, gallops or rubs.  ABDOMEN:  Soft, non-tender, non-distended.  Normal active bowel sounds.  No organomegaly.  EXTREMITIES:  No clubbing, cyanosis, or edema.  NEUROLOGICAL:  Mild decreased sensation to light touch present on her fingertips    DIAGNOSTIC DATA:  Lab Results   Component Value Date    WBC 7.51 05/23/2017    HGB 12.1 05/23/2017    HCT 36.4 05/23/2017    MCV 89.9 05/23/2017     05/23/2017       Chemistry        Component Value Date/Time     05/23/2017 1232    K 3.7 05/23/2017 1232     05/23/2017 1232    CO2 24.8 05/23/2017 1232    BUN 14 05/23/2017 1232    CREATININE 0.70 05/23/2017 1232    CREATININE 0.50 (L) 03/28/2017 0803        Component Value Date/Time    CALCIUM 8.9 05/23/2017 1232    ALKPHOS 68 05/09/2017 0845    AST 24 05/09/2017 0845    ALT 18 05/09/2017 0845    BILITOT 0.3 05/09/2017 0845          ASSESSMENT:  This is a 84 y.o. female with:  1.  Stage IVb aggressive appearing diffuse large B-cell lymphoma.  IPI score was 4.  There was likely central nervous system involvement in the spinal canal.  She declined intrathecal therapy.  She completed 6 cycles of therapy with miniCHOP with a complete response.  On review of her chemotherapy regimen from 2016 it actually does not appear that she received Rituxan with her chemotherapy, although this was intended.     The patient received Rituxan weekly ×4 beginning 4/3/2017.  She completed 4 weeks and tolerated this well with the addition of Solu-Cortef 100 mg for tongue swelling.      She initiated Treanda/Rituxan 5/9/17, again with Solu-Cortef 100 mg as premedication.  She tolerated treatment overall well with exception of some constipation and continued nightly mild tongue swelling.      She is due today for cycle 2 of therapy with plans to give a total of 6 cycles of Treanda/Rituxan.    LDH has normalized now which is a good sign of response, as this has gone along with her  "disease involvement.    She will undergo repeat CT scans in 3 weeks to re-evaluate disease. Ultimately, most of her suspected disease recurrence did not show up on CT imaging and required PET imaging.  We will plan to repeat PET imaging after 6 cycles, assuming she tolerates 6 cycles.      2.  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    3.  Nausea: She did not complain of this today.     4.  Global weakness: Her weakness has improved. She continues prednisone 5 mg with breakfast and 5 mg with lunch.    5.  Tongue swelling: Continue Solu-Cortef at 100 mg with treatment.  Continue Benadryl at night.  She will notify us if this worsens.    6.  Constipation related to chemotherapy. Improved with Milk of Magnesia as needed.    7.  Reported \"gallbladder\" attack with diffuse chest discomfort and abdominal cramping.  Patient states she has had attacks like this dating back to her 20s, though nothing in the last 10 years.  She has Lomotil prescribed by Dr. Alvarez which interestingly resolved her pain.  She was encouraged to let us know if she has repeated issues with this.  MRI done back in March 2017 did not show any abnormalities of the gallbladder.    8.  Patient reporting concerns that she has some type of bulging out on the left side of her rib cage. Examination reveals curvature of her spine to the left and her entire torso slightly cocked to the left.  She denies history of scoliosis but blames this on a lifetime of carrying things on her right side and possibly poor posture.  She has also lost a bit of weight over the last year and has more sagging skin/fat noticeable.  No palpable abnormalities.  Patient relieved to know this.      PLAN:  1. Proceed with cycle 2 of Treanda/Rituxan.  2. Repeat CT scans of the chest abdomen and pelvis in 3 weeks.  3. Return for follow-up with Dr. Ramírez Monday, July 3 to review imaging.  We will then plan for cycle #3 of bendamustine and Rituxan on July " 5 and sixth due to the [place] Day holiday.  4.  Continue when necessary medications for constipation.     Total of 30 minutes to the patient today, addressing multiple concerns outlined above beyond infusion therapy given today.

## 2017-06-07 ENCOUNTER — INFUSION (OUTPATIENT)
Dept: ONCOLOGY | Facility: HOSPITAL | Age: 82
End: 2017-06-07

## 2017-06-07 VITALS
SYSTOLIC BLOOD PRESSURE: 137 MMHG | HEART RATE: 67 BPM | DIASTOLIC BLOOD PRESSURE: 72 MMHG | BODY MASS INDEX: 22.75 KG/M2 | WEIGHT: 138.8 LBS | OXYGEN SATURATION: 97 % | TEMPERATURE: 97.8 F

## 2017-06-07 DIAGNOSIS — C83.30 DIFFUSE LARGE B-CELL LYMPHOMA, UNSPECIFIED BODY REGION (HCC): Primary | ICD-10-CM

## 2017-06-07 PROCEDURE — 25010000002 DEXAMETHASONE SODIUM PHOSPHATE 10 MG/ML SOLUTION 1 ML VIAL: Performed by: INTERNAL MEDICINE

## 2017-06-07 PROCEDURE — 96375 TX/PRO/DX INJ NEW DRUG ADDON: CPT | Performed by: NURSE PRACTITIONER

## 2017-06-07 PROCEDURE — 25010000002 BENDAMUSTINE HCL 100 MG/4ML SOLUTION 4 ML VIAL: Performed by: INTERNAL MEDICINE

## 2017-06-07 PROCEDURE — 96409 CHEMO IV PUSH SNGL DRUG: CPT | Performed by: NURSE PRACTITIONER

## 2017-06-07 RX ORDER — SODIUM CHLORIDE 9 MG/ML
250 INJECTION, SOLUTION INTRAVENOUS ONCE
Status: COMPLETED | OUTPATIENT
Start: 2017-06-07 | End: 2017-06-07

## 2017-06-07 RX ADMIN — SODIUM CHLORIDE 250 ML: 900 INJECTION, SOLUTION INTRAVENOUS at 14:03

## 2017-06-07 RX ADMIN — DEXAMETHASONE SODIUM PHOSPHATE 12 MG: 10 INJECTION, SOLUTION INTRAMUSCULAR; INTRAVENOUS at 14:02

## 2017-06-07 RX ADMIN — BENDAMUSTINE HYDROCHLORIDE 150 MG: 25 INJECTION, SOLUTION INTRAVENOUS at 14:23

## 2017-06-19 ENCOUNTER — TELEPHONE (OUTPATIENT)
Dept: GENERAL RADIOLOGY | Facility: HOSPITAL | Age: 82
End: 2017-06-19

## 2017-06-19 ENCOUNTER — TELEPHONE (OUTPATIENT)
Dept: ONCOLOGY | Facility: CLINIC | Age: 82
End: 2017-06-19

## 2017-06-19 DIAGNOSIS — C83.30 DIFFUSE LARGE B-CELL LYMPHOMA, UNSPECIFIED BODY REGION (HCC): Primary | ICD-10-CM

## 2017-06-19 RX ORDER — DIPHENOXYLATE HYDROCHLORIDE AND ATROPINE SULFATE 2.5; .025 MG/1; MG/1
2 TABLET ORAL 4 TIMES DAILY PRN
Qty: 30 TABLET | Refills: 0 | OUTPATIENT
Start: 2017-06-19 | End: 2017-06-20

## 2017-06-19 NOTE — TELEPHONE ENCOUNTER
Patient calling c/o diarrhea since she started treatment. Patient says she takes about 6 immodium a day. She is not able to keep it under control. Reviewed with candi Cohen to prescribe Lomotil for patient. Patient needs to come in this week to be seen by an NP and have labs checked since the diarrhea has lasted this long. Notified patient and she v/u. Message sent to scheduling and lomotil called in.

## 2017-06-19 NOTE — TELEPHONE ENCOUNTER
----- Message from Diana Elaine RN sent at 6/19/2017 12:20 PM EDT -----  Please set patient up for NP visit with CBC and CMP this week. Today or tomorrow preferably.

## 2017-06-20 ENCOUNTER — LAB (OUTPATIENT)
Dept: LAB | Facility: HOSPITAL | Age: 82
End: 2017-06-20

## 2017-06-20 ENCOUNTER — OFFICE VISIT (OUTPATIENT)
Dept: ONCOLOGY | Facility: CLINIC | Age: 82
End: 2017-06-20

## 2017-06-20 ENCOUNTER — OFFICE VISIT (OUTPATIENT)
Dept: FAMILY MEDICINE CLINIC | Facility: CLINIC | Age: 82
End: 2017-06-20

## 2017-06-20 VITALS
DIASTOLIC BLOOD PRESSURE: 73 MMHG | SYSTOLIC BLOOD PRESSURE: 139 MMHG | BODY MASS INDEX: 21.53 KG/M2 | RESPIRATION RATE: 16 BRPM | TEMPERATURE: 98.6 F | WEIGHT: 134 LBS | HEIGHT: 66 IN | HEART RATE: 83 BPM

## 2017-06-20 VITALS
HEIGHT: 66 IN | HEART RATE: 67 BPM | TEMPERATURE: 97.7 F | BODY MASS INDEX: 21.41 KG/M2 | OXYGEN SATURATION: 97 % | WEIGHT: 133.2 LBS | SYSTOLIC BLOOD PRESSURE: 142 MMHG | DIASTOLIC BLOOD PRESSURE: 70 MMHG | RESPIRATION RATE: 16 BRPM

## 2017-06-20 DIAGNOSIS — C83.30 DIFFUSE LARGE B-CELL LYMPHOMA, UNSPECIFIED BODY REGION (HCC): ICD-10-CM

## 2017-06-20 DIAGNOSIS — K58.0 IRRITABLE BOWEL SYNDROME WITH DIARRHEA: ICD-10-CM

## 2017-06-20 DIAGNOSIS — K58.0 IRRITABLE BOWEL SYNDROME WITH DIARRHEA: Primary | ICD-10-CM

## 2017-06-20 DIAGNOSIS — C83.30 DIFFUSE LARGE B-CELL LYMPHOMA, UNSPECIFIED BODY REGION (HCC): Primary | ICD-10-CM

## 2017-06-20 LAB
ALBUMIN SERPL-MCNC: 3.7 G/DL (ref 3.5–5.2)
ALBUMIN/GLOB SERPL: 1.2 G/DL (ref 1.1–2.4)
ALP SERPL-CCNC: 73 U/L (ref 38–116)
ALT SERPL W P-5'-P-CCNC: 18 U/L (ref 0–33)
ANION GAP SERPL CALCULATED.3IONS-SCNC: 14.4 MMOL/L
AST SERPL-CCNC: 22 U/L (ref 0–32)
BASOPHILS # BLD AUTO: 0.07 10*3/MM3 (ref 0–0.1)
BASOPHILS NFR BLD AUTO: 1 % (ref 0–1.1)
BILIRUB SERPL-MCNC: 0.2 MG/DL (ref 0.1–1.2)
BUN BLD-MCNC: 15 MG/DL (ref 6–20)
BUN/CREAT SERPL: 18.3 (ref 7.3–30)
CALCIUM SPEC-SCNC: 9 MG/DL (ref 8.5–10.2)
CHLORIDE SERPL-SCNC: 98 MMOL/L (ref 98–107)
CO2 SERPL-SCNC: 24.6 MMOL/L (ref 22–29)
CREAT BLD-MCNC: 0.82 MG/DL (ref 0.6–1.1)
DEPRECATED RDW RBC AUTO: 46.8 FL (ref 37–49)
EOSINOPHIL # BLD AUTO: 0.2 10*3/MM3 (ref 0–0.36)
EOSINOPHIL NFR BLD AUTO: 2.7 % (ref 1–5)
ERYTHROCYTE [DISTWIDTH] IN BLOOD BY AUTOMATED COUNT: 14.5 % (ref 11.7–14.5)
GFR SERPL CREATININE-BSD FRML MDRD: 66 ML/MIN/1.73
GLOBULIN UR ELPH-MCNC: 3 GM/DL (ref 1.8–3.5)
GLUCOSE BLD-MCNC: 88 MG/DL (ref 74–124)
HCT VFR BLD AUTO: 39.4 % (ref 34–45)
HGB BLD-MCNC: 13.1 G/DL (ref 11.5–14.9)
IMM GRANULOCYTES # BLD: 0.02 10*3/MM3 (ref 0–0.03)
IMM GRANULOCYTES NFR BLD: 0.3 % (ref 0–0.5)
LYMPHOCYTES # BLD AUTO: 0.36 10*3/MM3 (ref 1–3.5)
LYMPHOCYTES NFR BLD AUTO: 4.9 % (ref 20–49)
MCH RBC QN AUTO: 29.5 PG (ref 27–33)
MCHC RBC AUTO-ENTMCNC: 33.2 G/DL (ref 32–35)
MCV RBC AUTO: 88.7 FL (ref 83–97)
MONOCYTES # BLD AUTO: 0.63 10*3/MM3 (ref 0.25–0.8)
MONOCYTES NFR BLD AUTO: 8.6 % (ref 4–12)
NEUTROPHILS # BLD AUTO: 6.06 10*3/MM3 (ref 1.5–7)
NEUTROPHILS NFR BLD AUTO: 82.5 % (ref 39–75)
NRBC BLD MANUAL-RTO: 0 /100 WBC (ref 0–0)
PLATELET # BLD AUTO: 248 10*3/MM3 (ref 150–375)
PMV BLD AUTO: 9.4 FL (ref 8.9–12.1)
POTASSIUM BLD-SCNC: 3.6 MMOL/L (ref 3.5–4.7)
PROT SERPL-MCNC: 6.7 G/DL (ref 6.3–8)
RBC # BLD AUTO: 4.44 10*6/MM3 (ref 3.9–5)
SODIUM BLD-SCNC: 137 MMOL/L (ref 134–145)
WBC NRBC COR # BLD: 7.34 10*3/MM3 (ref 4–10)

## 2017-06-20 PROCEDURE — 99213 OFFICE O/P EST LOW 20 MIN: CPT | Performed by: NURSE PRACTITIONER

## 2017-06-20 PROCEDURE — 85025 COMPLETE CBC W/AUTO DIFF WBC: CPT

## 2017-06-20 PROCEDURE — 80053 COMPREHEN METABOLIC PANEL: CPT

## 2017-06-20 PROCEDURE — 99213 OFFICE O/P EST LOW 20 MIN: CPT | Performed by: FAMILY MEDICINE

## 2017-06-20 PROCEDURE — 36415 COLL VENOUS BLD VENIPUNCTURE: CPT

## 2017-06-20 RX ORDER — DIPHENOXYLATE HYDROCHLORIDE AND ATROPINE SULFATE 2.5; .025 MG/1; MG/1
1 TABLET ORAL 4 TIMES DAILY PRN
Qty: 30 TABLET | Refills: 0 | Status: SHIPPED | OUTPATIENT
Start: 2017-06-20 | End: 2017-06-29 | Stop reason: SDUPTHER

## 2017-06-20 NOTE — PROGRESS NOTES
REASON FOR FOLLOW-UP:    1.  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  IPI 4 out of 5 = high risk.  2. Therapy with miniCHOP initiated on 5/5/2016, complete as of 8/19/2016.  (In retrospect, it appears she did not receive Rituxan).  Complete metabolic response to therapy by PET imaging.  3.  PET imaging on 3/22/2017 consistent with recurrence.  See below.    4.  Initiation of Rituxan weekly for 4 weeks on 4/3/2017.  Complete as of 4/25/2017.  5.  Initiation of bendamustine and Rituxan on 5/9/2017.    ONCOLOGIC HISTORY:     Diffuse large B cell lymphoma    4/14/2016 Initial Diagnosis    Diffuse large B cell lymphoma by ultrasound-guided left supraclavicular lymph node biopsy.  Ki-67>95%.      4/28/2016 Imaging    PET scan:  There is fairly extensive lymphoma as described in detail  above. There are areas of intense metabolic activity localizing to the  spinal canal at the midthoracic level as well as low lumbar and sacral  levels. There is remarkably little detectable abnormality at these  levels by CT. MRI would be the best means for further delineation of  anatomic pathology. The degree of activity certainly could indicate an  underlying mass and cord compression should be evaluated. Furthermore,  there is noted a very small nodular focus of uptake within the upper  inner quadrant of the left breast which appears to correspond to tiny  nodular foci seen at concurrent CT imaging. The finding is nonspecific,  however should be closely followed after appropriate therapy for  lymphoma to ensure resolution and to exclude a possible breast  malignancy.      4/29/2016 Surgery    Mediport placed by Dr. Mihir Arrieta.      5/5/2016 - 8/19/2016 Chemotherapy    R-miniCHOP      9/9/2016 Imaging    PET scan:  There has been a dramatic interval improvement. There has been  resolution of all of the previously seen intensely hypermetabolic  lesions involving the thorax,  spine, retroperitoneum, and pelvis.      There is a new focus of hypermetabolic activity at the mid sigmoid colon  where there is mild acute diverticulitis, image 284.      3/22/2017 Imaging    PET scan:  IMPRESSION:  1. The tiny hypermetabolic nodes at the left axilla and left internal  mammary chain are similar to the hypermetabolic nodes seen on the PET/CT  from 04/28/2016. This suggests recurrence. The new hypermetabolic focus  within the left T2 lamina is concerning for a metastasis as well.  2. There is unusual hypermetabolic activity within the central aspect of  the right hepatic lobe corresponds to a much larger right portal vein  than on previous examinations. This suggests right portal vein  thrombosis. Further evaluation with contrast-enhanced CT of the abdomen  is recommended.  3. The symmetrical hypermetabolic activity throughout both lung fields  is of uncertain etiology as no airspace consolidations are present on  the corresponding CT. Extensive pneumonitis may have diffuse lung  activity, but the appearance on CT is not suggestive of pneumonitis. The  etiology of this finding is uncertain.      3/24/2017 Imaging    IMPRESSION:  Mildly enlarged bilateral axillary and mediastinal and left  internal mammary chain lymph nodes that showed mild hypermetabolism on a  recent PET/CT study consistent with recurrent lymphoma. No lung  abnormalities are identified to account for the low level abnormal  uptake noted on the PET/CT study. There is also no evidence of a lytic  or blastic lesion in the left lamina of T2 were the PET/CT study showed  focal increased activity. This does not exclude metastatic disease in  this location. Images of the abdomen and pelvis show no evidence of  recurrent lymphoma. There is no evidence of portal vein thrombosis.      4/3/2017 - 4/25/2017 Chemotherapy    OP LYMPHOMA (CLL) RiTUXimab (Weekly X 4)        5/9/2017 -  Chemotherapy    OP LYMPHOMA Bendamustine 90 mg/m2 / RiTUXimab  "375 mg/m2           HISTORY OF PRESENT ILLNESS:  Guerita De La Fuente is a 84 y.o. female who returns today for follow up, following initiation of cycle 2 of Treanda/Rituxan on 6/6/17. She has had some various GI issues since last treatment.     She reports fluctuations between constipation and diarrhea.  She relieves the constipation with 1 dose of Senokot S but the swings and diarrhea.  The diarrhea is not affected by the Imodium until 3 or 4 days following initiation.  She has approximately 4-6+ stools in a day during this time.  She is taking an adequate amount of fluids per her report and her weight is stable.  She did call into the office yesterday and we called in Lomotil which she has yet to .  We did want to recheck labs to make sure the diarrhea was not affecting her electrolytes.  She reports last cycle she had issues with constipation though the bowel side effects last a shorter period of time.  One differences between her regimen between last cycle and the most recent is that she had been on Align with cycle 1.  She states she just forgot to take it with this cycle.    She denies fevers, nausea, pain, or other concerns.    PAST MEDICAL, SURGICAL, FAMILY, AND SOCIAL HISTORIES were reviewed with the patient and in the electronic medical record, and were updated if indicated.    ALLERGIES:  Allergies   Allergen Reactions   • Allopurinol    • Codeine GI Intolerance   • Levaquin [Levofloxacin]    • Shellfish-Derived Products Other (See Comments)     \"CRABS\" \"I CAN'T REMEMBER WHAT THE REACTION WAS\"   • Zofran [Ondansetron Hcl] Hives   • Penicillins Rash   • Sulfa Antibiotics Rash       MEDICATIONS:  The medication list has been reviewed with the patient by the medical assistant, and the list has been updated in the electronic medical record, which I reviewed.  Medication dosages and frequencies were confirmed to be accurate.    REVIEW OF SYSTEMS:  PAIN:  See Vital Signs below.  GENERAL:  See HPI  SKIN: No " "rash  HEME/LYMPH:  No abnormal bleeding.    EYES:  No vision changes or diplopia.  ENT:  No sore throat or difficulty swallowing.Tongue swelling.  RESPIRATORY:  No cough, shortness of breath, hemoptysis, or wheezing.  CARDIOVASCULAR:  No chest pain, palpitations, orthopnea.  Mild dyspnea on exertion.  GASTROINTESTINAL:  See history of present illness  GENITOURINARY:  No dysuria or hematuria.  MUSCULOSKELETAL:  Occasional right hip and back pain.  NEUROLOGIC:  Grade 1-2 peripheral neuropathy, stable.  PSYCHIATRIC:  No depression, anxiety, or mood changes.    Vitals:    06/20/17 1108   BP: 142/70   Pulse: 67   Resp: 16   Temp: 97.7 °F (36.5 °C)   TempSrc: Oral   SpO2: 97%   Weight: 133 lb 3.2 oz (60.4 kg)   Height: 65.5\" (166.4 cm)   PainSc: 0-No pain     ECOG 1    PHYSICAL EXAMINATION:  GENERAL:  Well-developed well-nourished female; awake, alert and oriented, in no acute distress.  SKIN:  No rash or nonhealing lesions  HEAD:  Normocephalic, atraumatic.  EYES:  Pupils equal, round and reactive to light.  Extraocular movements intact.  Conjunctivae normal.  EARS:  Hearing intact.  NOSE:  Septum midline.  No excoriations or nasal discharge.  MOUTH:  No stomatitis or ulcers.  Lips are normal.  No gingival erythema.  THROAT:  Oropharynx without lesions or exudates.  NECK:  Supple with good range of motion; no thyromegaly or masses; no JVD or bruits.  CHEST:  Lungs are clear to auscultation bilaterally.  No wheezes, rales, or rhonchi.  A Mediport is present in the right subclavian area that is benign in appearance.  HEART:  Regular rate and rhythm.  No murmurs, gallops or rubs.  ABDOMEN:  Soft, non-tender, non-distended.  Normal active bowel sounds.  No organomegaly.  EXTREMITIES:  No clubbing, cyanosis, or edema.  NEUROLOGICAL:  Mild decreased sensation to light touch present on her fingertips    DIAGNOSTIC DATA:  Lab Results   Component Value Date    WBC 7.34 06/20/2017    HGB 13.1 06/20/2017    HCT 39.4 06/20/2017    " MCV 88.7 06/20/2017     06/20/2017       Chemistry        Component Value Date/Time     06/20/2017 1038    K 3.6 06/20/2017 1038    CL 98 06/20/2017 1038    CO2 24.6 06/20/2017 1038    BUN 15 06/20/2017 1038    CREATININE 0.82 06/20/2017 1038    CREATININE 0.50 (L) 03/28/2017 0803        Component Value Date/Time    CALCIUM 9.0 06/20/2017 1038    ALKPHOS 73 06/20/2017 1038    AST 22 06/20/2017 1038    ALT 18 06/20/2017 1038    BILITOT 0.2 06/20/2017 1038          ASSESSMENT:  This is a 84 y.o. female with:  1.  Stage IVb aggressive appearing diffuse large B-cell lymphoma.  IPI score was 4.  There was likely central nervous system involvement in the spinal canal.  She declined intrathecal therapy.  She completed 6 cycles of therapy with miniCHOP with a complete response.  On review of her chemotherapy regimen from 2016 it actually does not appear that she received Rituxan with her chemotherapy, although this was intended.     The patient received Rituxan weekly ×4 beginning 4/3/2017.  She completed 4 weeks and tolerated this well with the addition of Solu-Cortef 100 mg for tongue swelling.      She initiated Treanda/Rituxan 5/9/17, again with Solu-Cortef 100 mg as premedication.  She tolerated treatment overall well with exception of some constipation and continued nightly mild tongue swelling.      She initiated cycle 2 of Treanda Rituxan on 6/6/2017.  She reports fluctuations between constipation and diarrhea as discussed below.    LDH has normalized now which is a good sign of response, as this has gone along with her disease involvement.    She will undergo repeat CT scans in 1 weeks to re-evaluate disease. Ultimately, most of her suspected disease recurrence did not show up on CT imaging and required PET imaging.  We will plan to repeat PET imaging after 6 cycles, assuming she tolerates 6 cycles.      2.  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    3.  Nausea: She did  "not complain of this today.     4.  Global weakness: Her weakness has improved. She continues prednisone 5 mg with breakfast and 5 mg with lunch.    5.  Tongue swelling: Continue Solu-Cortef at 100 mg with treatment.  Continue Benadryl at night.  She will notify us if this worsens.    6.  Constipation and diarrhea related to chemotherapy.  The patient has had fluctuations back and forth following cycle 2.  We discussed changing her bowel regimen.  When she experiences constipation she will try docusate stool softener, hopefully lessening the switching to diarrhea.  The patient will discontinue Imodium and trial Lomotil for her diarrhea, hoping this will give her results quicker therefore reducing further constipation.  She will also restart her align probiotic as she seemed to have better bowel control last cycle.  We'll call the office if this is not improving.  Chemistry labs were reviewed as normal.      7.  Reported \"gallbladder\" attack with diffuse chest discomfort and abdominal cramping following cycle 1.  Patient states she has had attacks like this dating back to her 20s, though nothing in the last 10 years.  She has Lomotil prescribed by Dr. Alvarez which interestingly resolved her pain.  She was encouraged to let us know if she has repeated issues with this.  MRI done back in March 2017 did not show any abnormalities of the gallbladder.        PLAN:  1.  Lomotil sent to pharmacy.  2.  Written bowel protocol given to patient to include docusate for constipation, Lomotil for diarrhea, and Align daily.  3. Return for follow-up with Dr. Ramírez Monday, July 3 to review imaging.  We will then plan for cycle #3 of bendamustine and Rituxan on July 5 and sixth due to the [place] Day holiday.    "

## 2017-06-20 NOTE — PROGRESS NOTES
"Subjective   Guerita De La Fuente is a 84 y.o. female.     CC: Abdominal Pain/Diarrhea    History of Present Illness     Pt, currently undergoing chemotherapy for lymphoma, returns today to discuss her bowel issues. She reports that, since her 20's, she has had episodes throughout her life where she will get abdominal pain/bloating/cramps and diarrhea. Responds well to Lomotil prn use. Denies other sx related to this.      The following portions of the patient's history were reviewed and updated as appropriate: allergies, current medications, past family history, past medical history, past social history, past surgical history and problem list.    Review of Systems   Constitutional: Negative for activity change, chills, fatigue and fever.   Respiratory: Negative for cough and chest tightness.    Cardiovascular: Negative for chest pain and palpitations.   Gastrointestinal: Positive for abdominal pain and diarrhea. Negative for nausea.   Endocrine: Negative for cold intolerance.   Psychiatric/Behavioral: Negative for behavioral problems and dysphoric mood.     /73  Pulse 83  Temp 98.6 °F (37 °C) (Oral)   Resp 16  Ht 65.5\" (166.4 cm)  Wt 134 lb (60.8 kg)  BMI 21.96 kg/m2    Objective   Physical Exam   Constitutional: She appears well-developed and well-nourished.   Neck: Neck supple. No thyromegaly present.   Cardiovascular: Normal rate and regular rhythm.    No murmur heard.  Pulmonary/Chest: Effort normal and breath sounds normal.   Abdominal: Bowel sounds are normal. She exhibits no distension. There is no tenderness.   Psychiatric: She has a normal mood and affect. Her behavior is normal.   Nursing note and vitals reviewed.  Labs reviewed with pt today during visit. All questions answered.      Assessment/Plan   Guerita was seen today for diarrhea and abdominal pain.    Diagnoses and all orders for this visit:    Irritable bowel syndrome with diarrhea  -     diphenoxylate-atropine (LOMOTIL) 2.5-0.025 MG " per tablet; Take 1 tablet by mouth 4 (Four) Times a Day As Needed for Diarrhea.

## 2017-06-21 ENCOUNTER — TELEPHONE (OUTPATIENT)
Dept: ONCOLOGY | Facility: HOSPITAL | Age: 82
End: 2017-06-21

## 2017-06-21 ENCOUNTER — TELEPHONE (OUTPATIENT)
Dept: ONCOLOGY | Facility: CLINIC | Age: 82
End: 2017-06-21

## 2017-06-21 NOTE — TELEPHONE ENCOUNTER
Pt daughter, Karyn calling and wanting clarification on Lomotil. Informed her it was for diarrhea. She states her mother is having chemo brain. Mother thought that Dr. Barone office gave her Lomotil for gallbladder attacks. Informed her the only use know to RN was for diarrhea and her mother should take it as instructed if she is continuing to have diarrhea.She V/U and is going to call Dr. Barone office to see what was prescribed for gallbladder attacks.

## 2017-06-25 ENCOUNTER — APPOINTMENT (OUTPATIENT)
Dept: GENERAL RADIOLOGY | Facility: HOSPITAL | Age: 82
End: 2017-06-25

## 2017-06-25 ENCOUNTER — HOSPITAL ENCOUNTER (EMERGENCY)
Facility: HOSPITAL | Age: 82
Discharge: HOME | End: 2017-06-25
Attending: EMERGENCY MEDICINE | Admitting: EMERGENCY MEDICINE

## 2017-06-25 ENCOUNTER — APPOINTMENT (OUTPATIENT)
Dept: CT IMAGING | Facility: HOSPITAL | Age: 82
End: 2017-06-25

## 2017-06-25 VITALS
HEART RATE: 67 BPM | SYSTOLIC BLOOD PRESSURE: 146 MMHG | RESPIRATION RATE: 16 BRPM | TEMPERATURE: 96.5 F | BODY MASS INDEX: 21.66 KG/M2 | DIASTOLIC BLOOD PRESSURE: 75 MMHG | HEIGHT: 65 IN | OXYGEN SATURATION: 96 % | WEIGHT: 130 LBS

## 2017-06-25 DIAGNOSIS — R11.2 NAUSEA VOMITING AND DIARRHEA: Primary | ICD-10-CM

## 2017-06-25 DIAGNOSIS — R19.7 NAUSEA VOMITING AND DIARRHEA: Primary | ICD-10-CM

## 2017-06-25 LAB
ALBUMIN SERPL-MCNC: 4.3 G/DL (ref 3.5–5.2)
ALBUMIN/GLOB SERPL: 1.5 G/DL
ALP SERPL-CCNC: 69 U/L (ref 39–117)
ALT SERPL W P-5'-P-CCNC: 22 U/L (ref 1–33)
ANION GAP SERPL CALCULATED.3IONS-SCNC: 17.6 MMOL/L
AST SERPL-CCNC: 24 U/L (ref 1–32)
BASOPHILS # BLD AUTO: 0.01 10*3/MM3 (ref 0–0.2)
BASOPHILS NFR BLD AUTO: 0.1 % (ref 0–1.5)
BILIRUB SERPL-MCNC: 0.3 MG/DL (ref 0.1–1.2)
BILIRUB UR QL STRIP: NEGATIVE
BUN BLD-MCNC: 13 MG/DL (ref 8–23)
BUN/CREAT SERPL: 17.6 (ref 7–25)
CALCIUM SPEC-SCNC: 9.2 MG/DL (ref 8.6–10.5)
CHLORIDE SERPL-SCNC: 98 MMOL/L (ref 98–107)
CLARITY UR: CLEAR
CO2 SERPL-SCNC: 17.4 MMOL/L (ref 22–29)
COLOR UR: YELLOW
CREAT BLD-MCNC: 0.74 MG/DL (ref 0.57–1)
DEPRECATED RDW RBC AUTO: 46.5 FL (ref 37–54)
EOSINOPHIL # BLD AUTO: 0.01 10*3/MM3 (ref 0–0.7)
EOSINOPHIL NFR BLD AUTO: 0.1 % (ref 0.3–6.2)
ERYTHROCYTE [DISTWIDTH] IN BLOOD BY AUTOMATED COUNT: 14.9 % (ref 11.7–13)
GFR SERPL CREATININE-BSD FRML MDRD: 75 ML/MIN/1.73
GLOBULIN UR ELPH-MCNC: 2.8 GM/DL
GLUCOSE BLD-MCNC: 134 MG/DL (ref 65–99)
GLUCOSE UR STRIP-MCNC: NEGATIVE MG/DL
HCT VFR BLD AUTO: 38.1 % (ref 35.6–45.5)
HGB BLD-MCNC: 13.3 G/DL (ref 11.9–15.5)
HGB UR QL STRIP.AUTO: NEGATIVE
HOLD SPECIMEN: NORMAL
HOLD SPECIMEN: NORMAL
IMM GRANULOCYTES # BLD: 0.03 10*3/MM3 (ref 0–0.03)
IMM GRANULOCYTES NFR BLD: 0.4 % (ref 0–0.5)
KETONES UR QL STRIP: NEGATIVE
LEUKOCYTE ESTERASE UR QL STRIP.AUTO: NEGATIVE
LYMPHOCYTES # BLD AUTO: 0.4 10*3/MM3 (ref 0.9–4.8)
LYMPHOCYTES NFR BLD AUTO: 5.9 % (ref 19.6–45.3)
MAGNESIUM SERPL-MCNC: 2.4 MG/DL (ref 1.6–2.4)
MCH RBC QN AUTO: 29.6 PG (ref 26.9–32)
MCHC RBC AUTO-ENTMCNC: 34.9 G/DL (ref 32.4–36.3)
MCV RBC AUTO: 84.7 FL (ref 80.5–98.2)
MONOCYTES # BLD AUTO: 0.47 10*3/MM3 (ref 0.2–1.2)
MONOCYTES NFR BLD AUTO: 6.9 % (ref 5–12)
NEUTROPHILS # BLD AUTO: 5.89 10*3/MM3 (ref 1.9–8.1)
NEUTROPHILS NFR BLD AUTO: 86.6 % (ref 42.7–76)
NITRITE UR QL STRIP: NEGATIVE
PH UR STRIP.AUTO: 7.5 [PH] (ref 5–8)
PLATELET # BLD AUTO: 209 10*3/MM3 (ref 140–500)
PMV BLD AUTO: 9.6 FL (ref 6–12)
POTASSIUM BLD-SCNC: 3.8 MMOL/L (ref 3.5–5.2)
PROT SERPL-MCNC: 7.1 G/DL (ref 6–8.5)
PROT UR QL STRIP: NEGATIVE
RBC # BLD AUTO: 4.5 10*6/MM3 (ref 3.9–5.2)
SODIUM BLD-SCNC: 133 MMOL/L (ref 136–145)
SP GR UR STRIP: 1.01 (ref 1–1.03)
TROPONIN T SERPL-MCNC: <0.01 NG/ML (ref 0–0.03)
UROBILINOGEN UR QL STRIP: NORMAL
WBC NRBC COR # BLD: 6.81 10*3/MM3 (ref 4.5–10.7)
WHOLE BLOOD HOLD SPECIMEN: NORMAL
WHOLE BLOOD HOLD SPECIMEN: NORMAL

## 2017-06-25 PROCEDURE — 25010000002 PROMETHAZINE PER 50 MG: Performed by: EMERGENCY MEDICINE

## 2017-06-25 PROCEDURE — 93005 ELECTROCARDIOGRAM TRACING: CPT

## 2017-06-25 PROCEDURE — 83735 ASSAY OF MAGNESIUM: CPT | Performed by: EMERGENCY MEDICINE

## 2017-06-25 PROCEDURE — 96361 HYDRATE IV INFUSION ADD-ON: CPT

## 2017-06-25 PROCEDURE — 71010 HC CHEST PA OR AP: CPT

## 2017-06-25 PROCEDURE — 85025 COMPLETE CBC W/AUTO DIFF WBC: CPT | Performed by: EMERGENCY MEDICINE

## 2017-06-25 PROCEDURE — 81003 URINALYSIS AUTO W/O SCOPE: CPT | Performed by: EMERGENCY MEDICINE

## 2017-06-25 PROCEDURE — 93010 ELECTROCARDIOGRAM REPORT: CPT | Performed by: INTERNAL MEDICINE

## 2017-06-25 PROCEDURE — 99284 EMERGENCY DEPT VISIT MOD MDM: CPT

## 2017-06-25 PROCEDURE — 96374 THER/PROPH/DIAG INJ IV PUSH: CPT

## 2017-06-25 PROCEDURE — 70450 CT HEAD/BRAIN W/O DYE: CPT

## 2017-06-25 PROCEDURE — 80053 COMPREHEN METABOLIC PANEL: CPT | Performed by: EMERGENCY MEDICINE

## 2017-06-25 PROCEDURE — 84484 ASSAY OF TROPONIN QUANT: CPT | Performed by: EMERGENCY MEDICINE

## 2017-06-25 RX ORDER — SODIUM CHLORIDE 0.9 % (FLUSH) 0.9 %
10 SYRINGE (ML) INJECTION AS NEEDED
Status: DISCONTINUED | OUTPATIENT
Start: 2017-06-25 | End: 2017-06-25 | Stop reason: HOSPADM

## 2017-06-25 RX ORDER — PROMETHAZINE HYDROCHLORIDE 25 MG/ML
12.5 INJECTION, SOLUTION INTRAMUSCULAR; INTRAVENOUS ONCE
Status: COMPLETED | OUTPATIENT
Start: 2017-06-25 | End: 2017-06-25

## 2017-06-25 RX ADMIN — PROMETHAZINE HYDROCHLORIDE 12.5 MG: 25 INJECTION INTRAMUSCULAR; INTRAVENOUS at 19:02

## 2017-06-25 RX ADMIN — SODIUM CHLORIDE 1000 ML: 9 INJECTION, SOLUTION INTRAVENOUS at 19:00

## 2017-06-25 NOTE — ED PROVIDER NOTES
EMERGENCY DEPARTMENT ENCOUNTER    CHIEF COMPLAINT  Chief Complaint: Weakness  History given by: Pt  History limited by: None  Room Number: 09/09  PMD: Vern Alvarez MD  [facility] - Dr. Ramírez    HPI:  Pt is a 84 y.o. female who presents complaining of dizziness, weakness, nausea, headache, diarrhea and vomiting for 3 weeks since last round of chemo. She states her dizziness and weakness worsened today.  Hx of lymphoma    Duration:  3 weeks, worsened today  Onset: gradual  Timing: constant  Location: n/a  Radiation: n/a  Quality: dizzy, weak  Intensity/Severity: moderate  Progression: unchnaged  Associated Symptoms: nausea, headache, diarrhea, vomiting  Aggravating Factors: none  Alleviating Factors: none  Previous Episodes: Hx of lymphoma  Treatment before arrival: none    PAST MEDICAL HISTORY  Active Ambulatory Problems     Diagnosis Date Noted   • Hip pain 11/17/2015   • Diffuse large B cell lymphoma 04/21/2016   • Drug rash 05/20/2016   • Fitting and adjustment of vascular catheter 03/02/2017   • Cough 03/03/2017   • Irritable bowel syndrome with diarrhea 06/20/2017     Resolved Ambulatory Problems     Diagnosis Date Noted   • Chemotherapy induced diarrhea 06/17/2016   • Short of breath on exertion 03/03/2017     Past Medical History:   Diagnosis Date   • Arthritis    • Cancer    • GERD (gastroesophageal reflux disease)    • Hip pain    • History of transfusion    • Macular degeneration of both eyes    • Muscle ache    • Torn ligament        PAST SURGICAL HISTORY  Past Surgical History:   Procedure Laterality Date   • CARPAL TUNNEL RELEASE Bilateral    • EYE SURGERY Bilateral     cataracts   • HIP PINNING Right 07/1992   • HYSTERECTOMY     • MAMMO BILATERAL  2014    normal   • ORTHOPEDIC SURGERY      hip right 4 pins   • AK INSJ TUNNELED CVC W/O SUBQ PORT/ AGE 5 YR/> N/A 4/29/2016    Procedure: INSERTION POWERPORT WITH FLURO;  Surgeon: Mihir Arrieta MD;  [place] [facility] [place] [facility] [place]  Service: General   •  TONSILLECTOMY AND ADENOIDECTOMY     • VEIN SURGERY Bilateral     LEGS       FAMILY HISTORY  Family History   Problem Relation Age of Onset   • Alcohol abuse Mother    • Cancer Brother 65     lung cancer       SOCIAL HISTORY  Social History     Social History   • Marital status: Single     Spouse name: N/A   • Number of children: N/A   • Years of education: College     Occupational History   • RN Retired     Social History Main Topics   • Smoking status: Never Smoker   • Smokeless tobacco: Never Used   • Alcohol use No   • Drug use: No   • Sexual activity: Defer     Other Topics Concern   • Not on file     Social History Narrative       ALLERGIES  Allopurinol; Codeine; Levaquin [levofloxacin]; Shellfish-derived products; Zofran [ondansetron hcl]; Penicillins; and Sulfa antibiotics    REVIEW OF SYSTEMS  Review of Systems   Constitutional: Negative for fever.   HENT: Negative for sore throat.    Eyes: Negative.    Respiratory: Negative for cough and shortness of breath.    Cardiovascular: Negative for chest pain.   Gastrointestinal: Positive for diarrhea, nausea and vomiting. Negative for abdominal pain.   Genitourinary: Negative for dysuria.   Musculoskeletal: Negative for neck pain.   Skin: Negative for rash.   Allergic/Immunologic: Negative.    Neurological: Positive for dizziness, weakness and headaches. Negative for numbness.   Hematological: Negative.    Psychiatric/Behavioral: Negative.    All other systems reviewed and are negative.      PHYSICAL EXAM  ED Triage Vitals   Temp Heart Rate Resp BP SpO2   06/25/17 1715 06/25/17 1715 06/25/17 1715 06/25/17 1715 06/25/17 1715   96.5 °F (35.8 °C) 108 16 188/88 100 %      Temp src Heart Rate Source Patient Position BP Location FiO2 (%)   -- -- -- -- --              Physical Exam   Constitutional: She is oriented to person, place, and time and well-developed, well-nourished, and in no distress. No distress.   HENT:   Head: Normocephalic and atraumatic.   Eyes: EOM are  normal. Pupils are equal, round, and reactive to light.   Neck: Normal range of motion. Neck supple.   Cardiovascular: Normal rate, regular rhythm and normal heart sounds.    Pulmonary/Chest: Effort normal and breath sounds normal. No respiratory distress.   Abdominal: Soft. There is no tenderness. There is no rebound and no guarding.   Musculoskeletal: Normal range of motion. She exhibits no edema.   Neurological: She is alert and oriented to person, place, and time. She has normal sensation and normal strength.   Skin: Skin is warm and dry. No rash noted.   Psychiatric: Mood and affect normal.   Nursing note and vitals reviewed.      LAB RESULTS  Lab Results (last 24 hours)     Procedure Component Value Units Date/Time    CBC & Differential [[id number]] Collected:  06/25/17 1734    Specimen:  Blood Updated:  06/25/17 1745    Narrative:       The following orders were created for panel order CBC & Differential.  Procedure                               Abnormality         Status                     ---------                               -----------         ------                     CBC Auto Differential[[id number]]        Abnormal            Final result                 Please view results for these tests on the individual orders.    Comprehensive Metabolic Panel [[id number]]  (Abnormal) Collected:  06/25/17 1734    Specimen:  Blood Updated:  06/25/17 1814     Glucose 134 (H) mg/dL      BUN 13 mg/dL      Creatinine 0.74 mg/dL      Sodium 133 (L) mmol/L      Potassium 3.8 mmol/L      Chloride 98 mmol/L      CO2 17.4 (L) mmol/L      Calcium 9.2 mg/dL      Total Protein 7.1 g/dL      Albumin 4.30 g/dL      ALT (SGPT) 22 U/L      AST (SGOT) 24 U/L      Alkaline Phosphatase 69 U/L      Total Bilirubin 0.3 mg/dL      eGFR Non African Amer 75 mL/min/1.73      Globulin 2.8 gm/dL      A/G Ratio 1.5 g/dL      BUN/Creatinine Ratio 17.6     Anion Gap 17.6 mmol/L     Narrative:       The MDRD GFR formula is only valid for adults  with stable renal function between ages 18 and 70.    Troponin [[id number]]  (Normal) Collected:  06/25/17 1734    Specimen:  Blood Updated:  06/25/17 1807     Troponin T <0.010 ng/mL     Narrative:       Troponin T Reference Ranges:  Less than 0.03 ng/mL:    Negative for AMI  0.03 to 0.09 ng/mL:      Indeterminant for AMI  Greater than 0.09 ng/mL: Positive for AMI    Magnesium [[id number]]  (Normal) Collected:  06/25/17 1734    Specimen:  Blood Updated:  06/25/17 1807     Magnesium 2.4 mg/dL     CBC Auto Differential [[id number]]  (Abnormal) Collected:  06/25/17 1734    Specimen:  Blood Updated:  06/25/17 1745     WBC 6.81 10*3/mm3      RBC 4.50 10*6/mm3      Hemoglobin 13.3 g/dL      Hematocrit 38.1 %      MCV 84.7 fL      MCH 29.6 pg      MCHC 34.9 g/dL      RDW 14.9 (H) %      RDW-SD 46.5 fl      MPV 9.6 fL      Platelets 209 10*3/mm3      Neutrophil % 86.6 (H) %      Lymphocyte % 5.9 (L) %      Monocyte % 6.9 %      Eosinophil % 0.1 (L) %      Basophil % 0.1 %      Immature Grans % 0.4 %      Neutrophils, Absolute 5.89 10*3/mm3      Lymphocytes, Absolute 0.40 (L) 10*3/mm3      Monocytes, Absolute 0.47 10*3/mm3      Eosinophils, Absolute 0.01 10*3/mm3      Basophils, Absolute 0.01 10*3/mm3      Immature Grans, Absolute 0.03 10*3/mm3     Urinalysis With / Culture If Indicated [[id number]]  (Normal) Collected:  06/25/17 1803    Specimen:  Urine from Urine, Catheter Updated:  06/25/17 1823     Color, UA Yellow     Appearance, UA Clear     pH, UA 7.5     Specific Gravity, UA 1.014     Glucose, UA Negative     Ketones, UA Negative     Bilirubin, UA Negative     Blood, UA Negative     Protein, UA Negative     Leuk Esterase, UA Negative     Nitrite, UA Negative     Urobilinogen, UA 0.2 E.U./dL    Narrative:       Urine microscopic not indicated.          I ordered the above labs and reviewed the results    RADIOLOGY  CT Head Without Contrast   Final Result   1. Generalized atrophy without acute finding.                            This study was performed with techniques to keep radiation doses as low   as reasonably achievable (ALARA). Individualized dose reduction   techniques using automated exposure control or adjustment of mA and/or   kV according to the patient size were employed.        This report was finalized on 6/25/2017 8:11 PM by West Malcolm MD.          XR Chest 1 View   Final Result   No evidence for active disease in the chest.       This report was finalized on 6/25/2017 6:21 PM by Dr. Teddy Duffy MD.               I ordered the above noted radiological studies. Interpreted by radiologist. Reviewed by me in PACS.       PROCEDURES  Procedures  EKG           EKG time: 1741  Rhythm/Rate: sinus, 75  Jpoint elevation in V1-2    Interpreted Contemporaneously by me, independently viewed  Slightly different compared to prior on 3/3/17      PROGRESS AND CONSULTS  ED Course     1727  Ordered EKG, blood work, UA and CXR for further evaluation.     1857  Ordered CT head for further evaluation, Phenergan for N/V and IV fluids for hydration.     2106  Rechecked patient. She states her Sx have improved, but her headache and dizziness return upon standing. Pt prefers to go home and says her daughter can stay with her tonight. Discussed negative CT head and plan to consult CBC.   Placed a call to CBC for consult.     2131  Discussed case with Dr Bhatt (CBC)  Reviewed history, exam, results and treatments.  Discussed concerns and plan of care. Dr Bhatt agrees that patient is stable and can be discharged home to follow up outpatient.     2133  Discussed consult with Dr. Bhatt, plan to discharge, and need to f/u with CBC. Pt understands and agrees with plan. All questions addressed.     MEDICAL DECISION MAKING  Results were reviewed/discussed with the patient and they were also made aware of online access. Pt also made aware that some labs, such as cultures, will not be resulted during ER visit and follow up with PMD is  necessary.     MDM  Number of Diagnoses or Management Options  Nausea vomiting and diarrhea:      Amount and/or Complexity of Data Reviewed  Clinical lab tests: ordered and reviewed (Sodium - 133  Troponin<0.010  WBC - 6.81)  Tests in the radiology section of CPT®: reviewed and ordered (CXR - no active disease  CT head - nothing acute)  Tests in the medicine section of CPT®: reviewed and ordered (See procedure note for EKG interpretation)  Decide to obtain previous medical records or to obtain history from someone other than the patient: yes  Review and summarize past medical records: yes (Multiple phone calls to Oncology regarding diarrhea. She stated that she was too sick to come into the office. )  Discuss the patient with other providers: yes (Dr. Bhatt - [facility])           DIAGNOSIS  Final diagnoses:   Nausea vomiting and diarrhea       DISPOSITION  DISCHARGE    Patient discharged in stable condition.    Reviewed implications of results, diagnosis, meds, responsibility to follow up, warning signs and symptoms of possible worsening, potential complications and reasons to return to ER.    Patient/Family voiced understanding of above instructions.    Discussed plan for discharge, as there is no emergent indication for admission.  Pt/family is agreeable and understands need for follow up and repeat testing.  Pt is aware that discharge does not mean that nothing is wrong but it indicates no emergency is present that requires admission and they must continue care with follow-up as given below or physician of their choice.     FOLLOW-UP  Vern Alvarez MD  [address] [facility] [address]  [facility] [id number] [phone]          Zeus Bhatt MD  [address] [facility] [address] [facility] [address]  [facility] [id number] [phone]    Schedule an appointment as soon as possible for a visit           Medication List      Changed          predniSONE 10 MG tablet   Commonly known as:  DELTASONE   Take 2 tablets by mouth Daily.   What changed:     - how much to take  - when to take this  - additional instructions               Latest Documented Vital Signs:  As of 9:37 PM  BP- 146/75 HR- 67 Temp- 96.5 °F (35.8 °C) O2 sat- 96%    --  Documentation assistance provided by beni Pool for Dr. Rosa.  Information recorded by the scribe was done at my direction and has been verified and validated by me.       Lindsey Pool  06/25/17 [id number]       Michele Rsoa MD  06/26/17 [id number]

## 2017-06-27 ENCOUNTER — HOSPITAL ENCOUNTER (OUTPATIENT)
Dept: PET IMAGING | Facility: HOSPITAL | Age: 82
Discharge: HOME | End: 2017-06-27
Attending: INTERNAL MEDICINE | Admitting: INTERNAL MEDICINE

## 2017-06-27 ENCOUNTER — TELEPHONE (OUTPATIENT)
Dept: SOCIAL WORK | Facility: HOSPITAL | Age: 82
End: 2017-06-27

## 2017-06-27 DIAGNOSIS — C83.30 DIFFUSE LARGE B-CELL LYMPHOMA, UNSPECIFIED BODY REGION (HCC): ICD-10-CM

## 2017-06-27 LAB — CREAT BLDA-MCNC: 0.8 MG/DL (ref 0.6–1.3)

## 2017-06-27 PROCEDURE — 0 DIATRIZOATE MEGLUMINE & SODIUM PER 1 ML: Performed by: INTERNAL MEDICINE

## 2017-06-27 PROCEDURE — 82565 ASSAY OF CREATININE: CPT

## 2017-06-27 PROCEDURE — 71260 CT THORAX DX C+: CPT

## 2017-06-27 PROCEDURE — 0 IOPAMIDOL 61 % SOLUTION: Performed by: INTERNAL MEDICINE

## 2017-06-27 PROCEDURE — 74177 CT ABD & PELVIS W/CONTRAST: CPT

## 2017-06-27 RX ADMIN — IOPAMIDOL 85 ML: 612 INJECTION, SOLUTION INTRAVENOUS at 08:14

## 2017-06-27 RX ADMIN — DIATRIZOATE MEGLUMINE AND DIATRIZOATE SODIUM 30 ML: 660; 100 LIQUID ORAL; RECTAL at 07:30

## 2017-06-27 NOTE — TELEPHONE ENCOUNTER
ER F/U phone call:   Pt states that she is still having diarrhea due to her chemo. Discussed with pt various ointments that she can apply to her rectum. She is to see her PCP on 6-29-17 and her oncologist on 7-3-17. No other questions or concerns voiced at this time. Lupe Shin RN

## 2017-06-29 ENCOUNTER — OFFICE VISIT (OUTPATIENT)
Dept: FAMILY MEDICINE CLINIC | Facility: CLINIC | Age: 82
End: 2017-06-29

## 2017-06-29 VITALS
BODY MASS INDEX: 21.38 KG/M2 | TEMPERATURE: 98.9 F | RESPIRATION RATE: 16 BRPM | HEART RATE: 79 BPM | SYSTOLIC BLOOD PRESSURE: 144 MMHG | DIASTOLIC BLOOD PRESSURE: 85 MMHG | HEIGHT: 66 IN | WEIGHT: 133 LBS

## 2017-06-29 DIAGNOSIS — K58.0 IRRITABLE BOWEL SYNDROME WITH DIARRHEA: ICD-10-CM

## 2017-06-29 DIAGNOSIS — Z79.890 POSTMENOPAUSAL HRT (HORMONE REPLACEMENT THERAPY): Primary | ICD-10-CM

## 2017-06-29 PROCEDURE — 99213 OFFICE O/P EST LOW 20 MIN: CPT | Performed by: FAMILY MEDICINE

## 2017-06-29 RX ORDER — DIPHENOXYLATE HYDROCHLORIDE AND ATROPINE SULFATE 2.5; .025 MG/1; MG/1
1 TABLET ORAL 3 TIMES DAILY
Qty: 90 TABLET | Refills: 2 | Status: SHIPPED | OUTPATIENT
Start: 2017-06-29

## 2017-06-29 NOTE — PROGRESS NOTES
"Subjective   Guerita De La Fuente is a 84 y.o. female.     History of Present Illness     Chief Complaint:   Chief Complaint   Patient presents with   • Irritable Bowel Syndrome     med refill venus sandhu       Guerita De La Fuente 84 y.o. female who presents today for Medical Management of the below listed issues and medication refills.  she has a history of   Patient Active Problem List   Diagnosis   • Hip pain   • Diffuse large B cell lymphoma   • Drug rash   • Fitting and adjustment of vascular catheter   • Cough   • Irritable bowel syndrome with diarrhea   • Postmenopausal HRT (hormone replacement therapy)   .  Since the last visit, she has overall felt well taking the Lomotil, yet did eat some \"moldy\" peanuts and had to go to the ED on the 25th as she got sick. She is feeling better now.  she has been compliant with   Current Outpatient Prescriptions:   •  diphenoxylate-atropine (LOMOTIL) 2.5-0.025 MG per tablet, Take 1 tablet by mouth 3 (Three) Times a Day., Disp: 90 tablet, Rfl: 2  •  acetaminophen (TYLENOL) 500 MG tablet, Take 500 mg by mouth every 6 (six) hours as needed for mild pain (1-3)., Disp: , Rfl:   •  aspirin 81 MG tablet, Take 81 mg by mouth., Disp: , Rfl:   •  calcium carbonate-cholecalciferol 500-400 MG-UNIT tablet tablet, Take 1 tablet by mouth 3 (three) times a day., Disp: , Rfl:   •  estrogens, conjugated, (PREMARIN) 0.3 MG tablet, Take 1 tablet by mouth Daily., Disp: 90 tablet, Rfl: 0  •  famotidine-calcium carb-mag hydroxide (PEPCID COMPLETE) -165 MG chewable tablet, Chew 1 tablet Daily., Disp: , Rfl:   •  glucosamine-chondroitin 500-400 MG capsule capsule, Take 1 capsule by mouth daily. PT HOLDING FOR SURGERY, Disp: , Rfl:   •  loratadine (CLARITIN) 10 MG tablet, Take 10 mg by mouth Daily., Disp: , Rfl:   •  Multiple Vitamins-Minerals (PRESERVISION AREDS 2) capsule, Take 1 capsule by mouth 2 (two) times a day., Disp: , Rfl:   •  predniSONE (DELTASONE) 10 MG tablet, Take 2 tablets by mouth " "Daily. (Patient taking differently: Take 15 mg by mouth Take As Directed. 5 mg tablet in the morning 10 mg tablet after lunch  Total of 15mg per day), Disp: 60 tablet, Rfl: 1  •  prochlorperazine (COMPAZINE) 10 MG tablet, Take 1 tablet by mouth Every 6 (Six) Hours As Needed for Nausea or Vomiting., Disp: 30 tablet, Rfl: 2  •  tiZANidine (ZANAFLEX) 2 MG tablet, Take 1 and 1/2 pill daily, Disp: 45 tablet, Rfl: 11.  she denies medication side effects.    All of the chronic condition(s) listed above are stable w/o issues.    Ancillary, she is going to be seeing her GYN shortly yet is out of her Premarin and is needing a temporary refill.    /85  Pulse 79  Temp 98.9 °F (37.2 °C) (Oral)   Resp 16  Ht 65.5\" (166.4 cm)  Wt 133 lb (60.3 kg)  BMI 21.8 kg/m2    Results for orders placed or performed during the hospital encounter of 06/27/17   POC Creatinine   Result Value Ref Range    Creatinine 0.80 0.60 - 1.30 mg/dL         The following portions of the patient's history were reviewed and updated as appropriate: allergies, current medications, past family history, past medical history, past social history, past surgical history and problem list.    Review of Systems   Constitutional: Negative for activity change, chills, fatigue and fever.   Respiratory: Negative for cough and chest tightness.    Cardiovascular: Negative for chest pain and palpitations.   Gastrointestinal: Negative for abdominal pain and nausea.   Endocrine: Negative for cold intolerance.   Psychiatric/Behavioral: Negative for behavioral problems and dysphoric mood.       Objective   Physical Exam   Constitutional: She appears well-developed and well-nourished.   Neck: Neck supple. No thyromegaly present.   Cardiovascular: Normal rate and regular rhythm.    No murmur heard.  Pulmonary/Chest: Effort normal and breath sounds normal.   Abdominal: Bowel sounds are normal.   Psychiatric: She has a normal mood and affect. Her behavior is normal. "   Nursing note and vitals reviewed.    The patient has read and signed the [facility] Controlled Substance Contract.  I will continue to see patient for regular follow up appointments.  They are well controlled on their medication.  RUSSELL has been reviewed by me and is updated every 3 months. The patient is aware of the potential for addiction and dependence.    Assessment/Plan   Guerita was seen today for irritable bowel syndrome.    Diagnoses and all orders for this visit:    Postmenopausal HRT (hormone replacement therapy)  -     estrogens, conjugated, (PREMARIN) 0.3 MG tablet; Take 1 tablet by mouth Daily.    Irritable bowel syndrome with diarrhea  -     diphenoxylate-atropine (LOMOTIL) 2.5-0.025 MG per tablet; Take 1 tablet by mouth 3 (Three) Times a Day.

## 2017-07-03 ENCOUNTER — INFUSION (OUTPATIENT)
Dept: ONCOLOGY | Facility: HOSPITAL | Age: 82
End: 2017-07-03

## 2017-07-03 ENCOUNTER — OFFICE VISIT (OUTPATIENT)
Dept: ONCOLOGY | Facility: CLINIC | Age: 82
End: 2017-07-03

## 2017-07-03 VITALS
SYSTOLIC BLOOD PRESSURE: 148 MMHG | RESPIRATION RATE: 16 BRPM | BODY MASS INDEX: 21.57 KG/M2 | HEIGHT: 66 IN | WEIGHT: 134.2 LBS | OXYGEN SATURATION: 97 % | TEMPERATURE: 97.7 F | DIASTOLIC BLOOD PRESSURE: 64 MMHG | HEART RATE: 76 BPM

## 2017-07-03 DIAGNOSIS — R39.9 UTI SYMPTOMS: Primary | ICD-10-CM

## 2017-07-03 DIAGNOSIS — R30.0 DYSURIA: ICD-10-CM

## 2017-07-03 DIAGNOSIS — Z45.2 FITTING AND ADJUSTMENT OF VASCULAR CATHETER: ICD-10-CM

## 2017-07-03 DIAGNOSIS — C83.30 DIFFUSE LARGE B-CELL LYMPHOMA, UNSPECIFIED BODY REGION (HCC): Primary | ICD-10-CM

## 2017-07-03 DIAGNOSIS — C83.30 DIFFUSE LARGE B-CELL LYMPHOMA, UNSPECIFIED BODY REGION (HCC): ICD-10-CM

## 2017-07-03 LAB
ALBUMIN SERPL-MCNC: 3.7 G/DL (ref 3.5–5.2)
ALBUMIN/GLOB SERPL: 1.3 G/DL (ref 1.1–2.4)
ALP SERPL-CCNC: 70 U/L (ref 38–116)
ALT SERPL W P-5'-P-CCNC: 19 U/L (ref 0–33)
ANION GAP SERPL CALCULATED.3IONS-SCNC: 16.6 MMOL/L
AST SERPL-CCNC: 24 U/L (ref 0–32)
BACTERIA UR QL AUTO: ABNORMAL /HPF
BASOPHILS # BLD AUTO: 0.06 10*3/MM3 (ref 0–0.1)
BASOPHILS NFR BLD AUTO: 0.7 % (ref 0–1.1)
BILIRUB SERPL-MCNC: 0.3 MG/DL (ref 0.1–1.2)
BILIRUB UR QL STRIP: NEGATIVE
BUN BLD-MCNC: 14 MG/DL (ref 6–20)
BUN/CREAT SERPL: 18.7 (ref 7.3–30)
CALCIUM SPEC-SCNC: 9.6 MG/DL (ref 8.5–10.2)
CHLORIDE SERPL-SCNC: 99 MMOL/L (ref 98–107)
CLARITY UR: CLEAR
CO2 SERPL-SCNC: 24.4 MMOL/L (ref 22–29)
COLOR UR: YELLOW
CREAT BLD-MCNC: 0.75 MG/DL (ref 0.6–1.1)
DEPRECATED RDW RBC AUTO: 45.5 FL (ref 37–49)
EOSINOPHIL # BLD AUTO: 0.11 10*3/MM3 (ref 0–0.36)
EOSINOPHIL NFR BLD AUTO: 1.3 % (ref 1–5)
ERYTHROCYTE [DISTWIDTH] IN BLOOD BY AUTOMATED COUNT: 14.3 % (ref 11.7–14.5)
GFR SERPL CREATININE-BSD FRML MDRD: 74 ML/MIN/1.73
GLOBULIN UR ELPH-MCNC: 2.8 GM/DL (ref 1.8–3.5)
GLUCOSE BLD-MCNC: 98 MG/DL (ref 74–124)
GLUCOSE UR STRIP-MCNC: NEGATIVE MG/DL
HCT VFR BLD AUTO: 38.7 % (ref 34–45)
HGB BLD-MCNC: 13.1 G/DL (ref 11.5–14.9)
HGB UR QL STRIP.AUTO: ABNORMAL
IMM GRANULOCYTES # BLD: 0.04 10*3/MM3 (ref 0–0.03)
IMM GRANULOCYTES NFR BLD: 0.5 % (ref 0–0.5)
KETONES UR QL STRIP: NEGATIVE
LDH SERPL-CCNC: 191 U/L (ref 99–259)
LEUKOCYTE ESTERASE UR QL STRIP.AUTO: ABNORMAL
LYMPHOCYTES # BLD AUTO: 0.27 10*3/MM3 (ref 1–3.5)
LYMPHOCYTES NFR BLD AUTO: 3.3 % (ref 20–49)
MCH RBC QN AUTO: 29.6 PG (ref 27–33)
MCHC RBC AUTO-ENTMCNC: 33.9 G/DL (ref 32–35)
MCV RBC AUTO: 87.6 FL (ref 83–97)
MONOCYTES # BLD AUTO: 0.51 10*3/MM3 (ref 0.25–0.8)
MONOCYTES NFR BLD AUTO: 6.2 % (ref 4–12)
NEUTROPHILS # BLD AUTO: 7.24 10*3/MM3 (ref 1.5–7)
NEUTROPHILS NFR BLD AUTO: 88 % (ref 39–75)
NITRITE UR QL STRIP: NEGATIVE
NRBC BLD MANUAL-RTO: 0 /100 WBC (ref 0–0)
PH UR STRIP.AUTO: 6 [PH] (ref 4.5–8)
PLATELET # BLD AUTO: 196 10*3/MM3 (ref 150–375)
PMV BLD AUTO: 9.2 FL (ref 8.9–12.1)
POTASSIUM BLD-SCNC: 3.4 MMOL/L (ref 3.5–4.7)
PROT SERPL-MCNC: 6.5 G/DL (ref 6.3–8)
PROT UR QL STRIP: NEGATIVE
RBC # BLD AUTO: 4.42 10*6/MM3 (ref 3.9–5)
RBC # UR: ABNORMAL /HPF
REF LAB TEST METHOD: ABNORMAL
SODIUM BLD-SCNC: 140 MMOL/L (ref 134–145)
SP GR UR STRIP: 1.01 (ref 1–1.03)
SQUAMOUS #/AREA URNS HPF: ABNORMAL /HPF
UROBILINOGEN UR QL STRIP: ABNORMAL
WBC NRBC COR # BLD: 8.23 10*3/MM3 (ref 4–10)
WBC UR QL AUTO: ABNORMAL /HPF

## 2017-07-03 PROCEDURE — 83615 LACTATE (LD) (LDH) ENZYME: CPT | Performed by: INTERNAL MEDICINE

## 2017-07-03 PROCEDURE — 87186 SC STD MICRODIL/AGAR DIL: CPT | Performed by: INTERNAL MEDICINE

## 2017-07-03 PROCEDURE — 81001 URINALYSIS AUTO W/SCOPE: CPT | Performed by: INTERNAL MEDICINE

## 2017-07-03 PROCEDURE — 25010000002 HEPARIN FLUSH (PORCINE) 100 UNIT/ML SOLUTION: Performed by: INTERNAL MEDICINE

## 2017-07-03 PROCEDURE — 96523 IRRIG DRUG DELIVERY DEVICE: CPT | Performed by: INTERNAL MEDICINE

## 2017-07-03 PROCEDURE — 85025 COMPLETE CBC W/AUTO DIFF WBC: CPT | Performed by: INTERNAL MEDICINE

## 2017-07-03 PROCEDURE — 87086 URINE CULTURE/COLONY COUNT: CPT | Performed by: INTERNAL MEDICINE

## 2017-07-03 PROCEDURE — 87077 CULTURE AEROBIC IDENTIFY: CPT | Performed by: INTERNAL MEDICINE

## 2017-07-03 PROCEDURE — 99215 OFFICE O/P EST HI 40 MIN: CPT | Performed by: INTERNAL MEDICINE

## 2017-07-03 PROCEDURE — 80053 COMPREHEN METABOLIC PANEL: CPT | Performed by: INTERNAL MEDICINE

## 2017-07-03 RX ORDER — SODIUM CHLORIDE 0.9 % (FLUSH) 0.9 %
10 SYRINGE (ML) INJECTION AS NEEDED
Status: DISCONTINUED | OUTPATIENT
Start: 2017-07-03 | End: 2017-07-03 | Stop reason: HOSPADM

## 2017-07-03 RX ORDER — CHOLESTYRAMINE 4 G/9G
1 POWDER, FOR SUSPENSION ORAL
Qty: 60 PACKET | Refills: 4 | Status: SHIPPED | OUTPATIENT
Start: 2017-07-03 | End: 2017-07-03 | Stop reason: SDUPTHER

## 2017-07-03 RX ORDER — CEFPODOXIME PROXETIL 100 MG/1
100 TABLET, FILM COATED ORAL EVERY 12 HOURS
Qty: 14 TABLET | Refills: 0 | Status: SHIPPED | OUTPATIENT
Start: 2017-07-03 | End: 2017-09-13 | Stop reason: SDUPTHER

## 2017-07-03 RX ORDER — SODIUM CHLORIDE 0.9 % (FLUSH) 0.9 %
10 SYRINGE (ML) INJECTION AS NEEDED
Status: CANCELLED | OUTPATIENT
Start: 2017-07-03

## 2017-07-03 RX ORDER — CHOLESTYRAMINE 4 G/9G
POWDER, FOR SUSPENSION ORAL
Qty: 180 PACKET | Refills: 4 | Status: SHIPPED | OUTPATIENT
Start: 2017-07-03

## 2017-07-03 RX ADMIN — Medication 10 ML: at 10:59

## 2017-07-03 RX ADMIN — SODIUM CHLORIDE, PRESERVATIVE FREE 500 UNITS: 5 INJECTION INTRAVENOUS at 11:00

## 2017-07-03 NOTE — PROGRESS NOTES
REASON FOR FOLLOW-UP:    1.  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  IPI 4 out of 5 = high risk.  2. Therapy with miniCHOP initiated on 5/5/2016, complete as of 8/19/2016.  (In retrospect, it appears she did not receive Rituxan).  Complete metabolic response to therapy by PET imaging.  3.  PET imaging on 3/22/2017 consistent with recurrence.  See below.    4.  Initiation of Rituxan weekly for 4 weeks on 4/3/2017.  Complete as of 4/25/2017.  5.  Initiation of bendamustine and Rituxan on 5/9/2017.    HISTORY OF PRESENT ILLNESS:  Guerita De La Fuente is a 84 y.o. female who returns today for follow up, anticipating cycle #3 of bendamustine and Rituxan which will start on Wednesday.     Her biggest complaint continues to be diarrhea.  This has been occurring over the past 4 weeks.  She did not experience and after cycle #1 of chemotherapy but has experienced after cycle #2.  She notes that bowel movements are soft but somewhat firm in the morning but then throughout the morning they become more watery.  These have occurred multiple times per day in spite of Imodium and Lomotil.  She denies any abdominal pain.  No fevers or chills.  No bright red blood per rectum.    She did present to the emergency department recently on 6/25/2017 with headache and multiple other GI symptoms.  She believes that she may have eaten some bad peanuts.  They did an in and out catheterization to obtain urine specimen.  That urinalysis appeared normal.  She felt better as soon as she vomited.  Since then, however, she has felt some urinary urgency and some discomfort.  She believes that she may have a urinary tract infection.       ONCOLOGIC HISTORY:     Diffuse large B cell lymphoma    4/14/2016 Initial Diagnosis    Diffuse large B cell lymphoma by ultrasound-guided left supraclavicular lymph node biopsy.  Ki-67>95%.      4/28/2016 Imaging    PET scan:  There is fairly extensive  lymphoma as described in detail  above. There are areas of intense metabolic activity localizing to the  spinal canal at the midthoracic level as well as low lumbar and sacral  levels. There is remarkably little detectable abnormality at these  levels by CT. MRI would be the best means for further delineation of  anatomic pathology. The degree of activity certainly could indicate an  underlying mass and cord compression should be evaluated. Furthermore,  there is noted a very small nodular focus of uptake within the upper  inner quadrant of the left breast which appears to correspond to tiny  nodular foci seen at concurrent CT imaging. The finding is nonspecific,  however should be closely followed after appropriate therapy for  lymphoma to ensure resolution and to exclude a possible breast  malignancy.      4/29/2016 Surgery    Mediport placed by Dr. Mihir Arrieta.      5/5/2016 - 8/19/2016 Chemotherapy    R-miniCHOP      9/9/2016 Imaging    PET scan:  There has been a dramatic interval improvement. There has been  resolution of all of the previously seen intensely hypermetabolic  lesions involving the thorax, spine, retroperitoneum, and pelvis.      There is a new focus of hypermetabolic activity at the mid sigmoid colon  where there is mild acute diverticulitis, image 284.      3/22/2017 Imaging    PET scan:  IMPRESSION:  1. The tiny hypermetabolic nodes at the left axilla and left internal  mammary chain are similar to the hypermetabolic nodes seen on the PET/CT  from 04/28/2016. This suggests recurrence. The new hypermetabolic focus  within the left T2 lamina is concerning for a metastasis as well.  2. There is unusual hypermetabolic activity within the central aspect of  the right hepatic lobe corresponds to a much larger right portal vein  than on previous examinations. This suggests right portal vein  thrombosis. Further evaluation with contrast-enhanced CT of the abdomen  is recommended.  3. The symmetrical  "hypermetabolic activity throughout both lung fields  is of uncertain etiology as no airspace consolidations are present on  the corresponding CT. Extensive pneumonitis may have diffuse lung  activity, but the appearance on CT is not suggestive of pneumonitis. The  etiology of this finding is uncertain.      3/24/2017 Imaging    IMPRESSION:  Mildly enlarged bilateral axillary and mediastinal and left  internal mammary chain lymph nodes that showed mild hypermetabolism on a  recent PET/CT study consistent with recurrent lymphoma. No lung  abnormalities are identified to account for the low level abnormal  uptake noted on the PET/CT study. There is also no evidence of a lytic  or blastic lesion in the left lamina of T2 were the PET/CT study showed  focal increased activity. This does not exclude metastatic disease in  this location. Images of the abdomen and pelvis show no evidence of  recurrent lymphoma. There is no evidence of portal vein thrombosis.      4/3/2017 - 4/25/2017 Chemotherapy    OP LYMPHOMA (CLL) RiTUXimab (Weekly X 4)        5/9/2017 -  Chemotherapy    OP LYMPHOMA Bendamustine 90 mg/m2 / RiTUXimab 375 mg/m2        6/27/2017 Imaging    CT imaging with improved lymphadenopathy         PAST MEDICAL, SURGICAL, FAMILY, AND SOCIAL HISTORIES were reviewed with the patient and in the electronic medical record, and were updated if indicated.    ALLERGIES:  Allergies   Allergen Reactions   • Allopurinol    • Codeine GI Intolerance   • Levaquin [Levofloxacin]    • Shellfish-Derived Products Other (See Comments)     \"CRABS\" \"I CAN'T REMEMBER WHAT THE REACTION WAS\"   • Zofran [Ondansetron Hcl] Hives   • Penicillins Rash   • Sulfa Antibiotics Rash       MEDICATIONS:  The medication list has been reviewed with the patient by the medical assistant, and the list has been updated in the electronic medical record, which I reviewed.  Medication dosages and frequencies were confirmed to be accurate.    REVIEW OF " "SYSTEMS:  PAIN:  See Vital Signs below.  GENERAL:  See HPI  SKIN: No rash  HEME/LYMPH:  No abnormal bleeding.    EYES:  No vision changes or diplopia.  ENT:  No sore throat or difficulty swallowing.Tongue swelling.  RESPIRATORY:  No cough, shortness of breath, hemoptysis, or wheezing.  CARDIOVASCULAR:  No chest pain, palpitations, orthopnea.  Mild dyspnea on exertion.  GASTROINTESTINAL:  See history of present illness  GENITOURINARY:  See history of present illness  MUSCULOSKELETAL:  Occasional right hip and back pain.  NEUROLOGIC:  Grade 1-2 peripheral neuropathy, stable.  PSYCHIATRIC:  No depression, anxiety, or mood changes.    Vitals:    07/03/17 1111   BP: 148/64   Pulse: 76   Resp: 16   Temp: 97.7 °F (36.5 °C)   SpO2: 97%   Weight: 134 lb 3.2 oz (60.9 kg)   Height: 65.5\" (166.4 cm)   PainSc: 0-No pain     ECOG 1    PHYSICAL EXAMINATION:  GENERAL:  Well-developed well-nourished female; awake, alert and oriented, in no acute distress.  SKIN:  No rash or nonhealing lesions  HEAD:  Normocephalic, atraumatic.  EYES:  Pupils equal, round and reactive to light.  Extraocular movements intact.  Conjunctivae normal.  EARS:  Hearing intact.  NOSE:  Septum midline.  No excoriations or nasal discharge.  MOUTH:  No stomatitis or ulcers.  Lips are normal.  No gingival erythema.  THROAT:  Oropharynx without lesions or exudates.  NECK:  Supple with good range of motion; no thyromegaly or masses; no JVD or bruits.  CHEST:  Lungs are clear to auscultation bilaterally.  No wheezes, rales, or rhonchi.  A Mediport is present in the right subclavian area that is benign in appearance.  HEART:  Regular rate and rhythm.  No murmurs, gallops or rubs.  ABDOMEN:  Soft, non-tender, non-distended.  Normal active bowel sounds.  No organomegaly.  EXTREMITIES:  No clubbing, cyanosis, or edema.  NEUROLOGICAL:  Mild decreased sensation to light touch present on her fingertips    DIAGNOSTIC DATA:  Lab Results   Component Value Date    WBC 8.23 " 07/03/2017    HGB 13.1 07/03/2017    HCT 38.7 07/03/2017    MCV 87.6 07/03/2017     07/03/2017       Chemistry        Component Value Date/Time     (L) 06/25/2017 1734    K 3.8 06/25/2017 1734    CL 98 06/25/2017 1734    CO2 17.4 (L) 06/25/2017 1734    BUN 13 06/25/2017 1734    CREATININE 0.80 06/27/2017 0814        Component Value Date/Time    CALCIUM 9.2 06/25/2017 1734    ALKPHOS 69 06/25/2017 1734    AST 24 06/25/2017 1734    ALT 22 06/25/2017 1734    BILITOT 0.3 06/25/2017 1734        Urinalysis too numerous to count white blood cells, 3+ bacteria, large leukocyte esterase    IMAGING:  CT imaging from 6/27/2017 personally reviewed.  No lymphadenopathy.  Lungs and liver appear normal as they did on prior CT imaging.    ASSESSMENT:  This is a 84 y.o. female with:  1.  Stage IVb aggressive appearing diffuse large B-cell lymphoma.  IPI score was 4.  There was likely central nervous system involvement in the spinal canal.  She declined intrathecal therapy.  She completed 6 cycles of therapy with miniCHOP with a complete response.  On review of her chemotherapy regimen from 2016 it actually does not appear that she received Rituxan with her chemotherapy, although this was intended.     The patient received Rituxan weekly ×4 beginning 4/3/2017.  She completed 4 weeks and tolerated this well with the addition of Solu-Cortef 100 mg for tongue swelling.      She initiated Treanda/Rituxan 5/9/17, again with Solu-Cortef 100 mg as premedication.  She tolerated treatment overall well with exception of some constipation and continued nightly mild tongue swelling.      She initiated cycle 2 of Treanda Rituxan on 6/6/2017.  She reports fluctuations between constipation and diarrhea as discussed below.    LDH has normalized now which is a good sign of response.    CT imaging shows an excellent response.  However, as discussed below, she is having worsening diarrhea.  We will hold the bendamustine for this cycle and  proceed with Rituxan alone on Wednesday.    2.  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    3.  Nausea: She did not complain of this today.     4.  Global weakness: Her weakness has improved. She continues prednisone 5 mg with breakfast and 5 mg with lunch.    5.  Tongue swelling: Continue Solu-Cortef at 100 mg with treatment.  Continue Benadryl at night.  She will notify us if this worsens.    6.  Diarrhea: Potentially chemotherapy related.  I have prescribed cholestyramine for her to use in addition to Imodium and Lomotil if necessary.  We will not check stool studies at this time but these may be necessary.  Again, we will hold bendamustine anticipating that the diarrhea will improve.    7.  Urinary symptoms: This was a new issue for me today.  Her urinalysis is consistent with a urinary tract infection.  We await culture data.  She notes an allergy to Levaquin, sulfa drugs, and penicillin.    PLAN:  1.  I will need to discuss her antibiotic allergies with her in order to treat her urinary tract infection.  2.  Hold bendamustine with this cycle.  We will, however, proceed with Rituxan this week on Wednesday as previous scheduled.  3.  Start Questran.  Electronically prescribed to her pharmacy.  Continue Imodium and Lomotil if needed.  4.  Return in 4 weeks for follow-up with bendamustine and Rituxan at that time if she is doing better.  She will notify us if the diarrhea persists.  We will need to check a stool specimen at that time.

## 2017-07-05 ENCOUNTER — INFUSION (OUTPATIENT)
Dept: ONCOLOGY | Facility: HOSPITAL | Age: 82
End: 2017-07-05

## 2017-07-05 ENCOUNTER — TELEPHONE (OUTPATIENT)
Dept: ONCOLOGY | Facility: CLINIC | Age: 82
End: 2017-07-05

## 2017-07-05 VITALS — DIASTOLIC BLOOD PRESSURE: 82 MMHG | SYSTOLIC BLOOD PRESSURE: 145 MMHG | HEART RATE: 70 BPM

## 2017-07-05 DIAGNOSIS — C83.30 DIFFUSE LARGE B-CELL LYMPHOMA, UNSPECIFIED BODY REGION (HCC): ICD-10-CM

## 2017-07-05 DIAGNOSIS — C83.30 DIFFUSE LARGE B-CELL LYMPHOMA, UNSPECIFIED BODY REGION (HCC): Primary | ICD-10-CM

## 2017-07-05 LAB — BACTERIA SPEC AEROBE CULT: ABNORMAL

## 2017-07-05 PROCEDURE — 96375 TX/PRO/DX INJ NEW DRUG ADDON: CPT | Performed by: INTERNAL MEDICINE

## 2017-07-05 PROCEDURE — 25010000002 RITUXIMAB 10 MG/ML SOLUTION 50 ML VIAL: Performed by: INTERNAL MEDICINE

## 2017-07-05 PROCEDURE — 96413 CHEMO IV INFUSION 1 HR: CPT | Performed by: INTERNAL MEDICINE

## 2017-07-05 PROCEDURE — 25010000002 DEXAMETHASONE SODIUM PHOSPHATE 10 MG/ML SOLUTION 1 ML VIAL: Performed by: INTERNAL MEDICINE

## 2017-07-05 PROCEDURE — 96415 CHEMO IV INFUSION ADDL HR: CPT | Performed by: INTERNAL MEDICINE

## 2017-07-05 PROCEDURE — 25010000002 RITUXIMAB 10 MG/ML SOLUTION 10 ML VIAL: Performed by: INTERNAL MEDICINE

## 2017-07-05 PROCEDURE — 25010000002 DIPHENHYDRAMINE PER 50 MG: Performed by: INTERNAL MEDICINE

## 2017-07-05 PROCEDURE — 25010000002 HYDROCORTISONE SODIUM SUCCINATE 100 MG RECONSTITUTED SOLUTION: Performed by: INTERNAL MEDICINE

## 2017-07-05 PROCEDURE — 25010000002 PALONOSETRON PER 25 MCG: Performed by: INTERNAL MEDICINE

## 2017-07-05 RX ORDER — DIPHENHYDRAMINE HYDROCHLORIDE 50 MG/ML
50 INJECTION INTRAMUSCULAR; INTRAVENOUS AS NEEDED
Status: CANCELLED | OUTPATIENT
Start: 2017-07-05

## 2017-07-05 RX ORDER — NITROFURANTOIN 25; 75 MG/1; MG/1
100 CAPSULE ORAL 2 TIMES DAILY
Qty: 10 CAPSULE | Refills: 0 | Status: SHIPPED | OUTPATIENT
Start: 2017-07-05 | End: 2017-07-10

## 2017-07-05 RX ORDER — FAMOTIDINE 10 MG/ML
20 INJECTION, SOLUTION INTRAVENOUS ONCE
Status: COMPLETED | OUTPATIENT
Start: 2017-07-05 | End: 2017-07-05

## 2017-07-05 RX ORDER — PALONOSETRON 0.05 MG/ML
0.25 INJECTION, SOLUTION INTRAVENOUS ONCE
Status: COMPLETED | OUTPATIENT
Start: 2017-07-05 | End: 2017-07-05

## 2017-07-05 RX ORDER — ACETAMINOPHEN 325 MG/1
650 TABLET ORAL ONCE
Status: COMPLETED | OUTPATIENT
Start: 2017-07-05 | End: 2017-07-05

## 2017-07-05 RX ORDER — SODIUM CHLORIDE 9 MG/ML
250 INJECTION, SOLUTION INTRAVENOUS ONCE
Status: CANCELLED | OUTPATIENT
Start: 2017-07-05

## 2017-07-05 RX ORDER — FAMOTIDINE 10 MG/ML
20 INJECTION, SOLUTION INTRAVENOUS AS NEEDED
Status: CANCELLED | OUTPATIENT
Start: 2017-07-05

## 2017-07-05 RX ORDER — MEPERIDINE HYDROCHLORIDE 50 MG/ML
25 INJECTION INTRAMUSCULAR; INTRAVENOUS; SUBCUTANEOUS
Status: CANCELLED | OUTPATIENT
Start: 2017-07-05

## 2017-07-05 RX ORDER — FAMOTIDINE 10 MG/ML
20 INJECTION, SOLUTION INTRAVENOUS ONCE
Status: CANCELLED | OUTPATIENT
Start: 2017-07-05

## 2017-07-05 RX ORDER — PALONOSETRON 0.05 MG/ML
0.25 INJECTION, SOLUTION INTRAVENOUS ONCE
Status: CANCELLED | OUTPATIENT
Start: 2017-07-05

## 2017-07-05 RX ORDER — ACETAMINOPHEN 325 MG/1
650 TABLET ORAL ONCE
Status: CANCELLED | OUTPATIENT
Start: 2017-07-05

## 2017-07-05 RX ORDER — SODIUM CHLORIDE 9 MG/ML
250 INJECTION, SOLUTION INTRAVENOUS ONCE
Status: COMPLETED | OUTPATIENT
Start: 2017-07-05 | End: 2017-07-05

## 2017-07-05 RX ADMIN — HYDROCORTISONE SODIUM SUCCINATE 100 MG: 100 INJECTION, POWDER, FOR SOLUTION INTRAMUSCULAR; INTRAVENOUS at 09:51

## 2017-07-05 RX ADMIN — RITUXIMAB 630 MG: 10 INJECTION, SOLUTION INTRAVENOUS at 10:55

## 2017-07-05 RX ADMIN — SODIUM CHLORIDE 250 ML: 900 INJECTION, SOLUTION INTRAVENOUS at 09:51

## 2017-07-05 RX ADMIN — ACETAMINOPHEN 650 MG: 325 TABLET ORAL at 09:51

## 2017-07-05 RX ADMIN — DIPHENHYDRAMINE HYDROCHLORIDE 50 MG: 50 INJECTION, SOLUTION INTRAMUSCULAR; INTRAVENOUS at 09:51

## 2017-07-05 RX ADMIN — DEXAMETHASONE SODIUM PHOSPHATE 12 MG: 10 INJECTION, SOLUTION INTRAMUSCULAR; INTRAVENOUS at 10:06

## 2017-07-05 RX ADMIN — FAMOTIDINE 20 MG: 10 INJECTION, SOLUTION INTRAVENOUS at 09:51

## 2017-07-05 RX ADMIN — PALONOSETRON HYDROCHLORIDE 0.25 MG: 0.25 INJECTION INTRAVENOUS at 09:51

## 2017-07-05 NOTE — TELEPHONE ENCOUNTER
Urine culture results received from lab. Message sent to Dr Ramírez to make sure he is aware of results.

## 2017-07-05 NOTE — TELEPHONE ENCOUNTER
Citrobacter urinary tract infection noted.  Susceptibilities noted.  She notes allergies to penicillins, sulfa, and quinolones.  The bacteria is susceptible to nitrofurantoin.  A prescription for 100 mg twice a day for 5 days was electronically sent to her [facility] pharmacy.      I discussed the urine culture results with her by phone today.

## 2017-07-06 ENCOUNTER — APPOINTMENT (OUTPATIENT)
Dept: ONCOLOGY | Facility: HOSPITAL | Age: 82
End: 2017-07-06

## 2017-07-20 RX ORDER — PREDNISONE 10 MG/1
TABLET ORAL
Qty: 60 TABLET | Refills: 0 | Status: SHIPPED | OUTPATIENT
Start: 2017-07-20 | End: 2017-09-21 | Stop reason: SDUPTHER

## 2017-08-01 ENCOUNTER — INFUSION (OUTPATIENT)
Dept: ONCOLOGY | Facility: HOSPITAL | Age: 82
End: 2017-08-01

## 2017-08-01 ENCOUNTER — OFFICE VISIT (OUTPATIENT)
Dept: ONCOLOGY | Facility: CLINIC | Age: 82
End: 2017-08-01

## 2017-08-01 VITALS
OXYGEN SATURATION: 98 % | SYSTOLIC BLOOD PRESSURE: 142 MMHG | TEMPERATURE: 97.8 F | RESPIRATION RATE: 16 BRPM | HEART RATE: 71 BPM | HEIGHT: 66 IN | DIASTOLIC BLOOD PRESSURE: 70 MMHG | BODY MASS INDEX: 21.79 KG/M2 | WEIGHT: 135.6 LBS

## 2017-08-01 VITALS — SYSTOLIC BLOOD PRESSURE: 165 MMHG | DIASTOLIC BLOOD PRESSURE: 78 MMHG | HEART RATE: 80 BPM

## 2017-08-01 DIAGNOSIS — C83.30 DIFFUSE LARGE B-CELL LYMPHOMA, UNSPECIFIED BODY REGION (HCC): ICD-10-CM

## 2017-08-01 DIAGNOSIS — C83.30 DIFFUSE LARGE B-CELL LYMPHOMA, UNSPECIFIED BODY REGION (HCC): Primary | ICD-10-CM

## 2017-08-01 DIAGNOSIS — C83.34 DIFFUSE LARGE B-CELL LYMPHOMA OF LYMPH NODES OF UPPER EXTREMITY (HCC): Primary | ICD-10-CM

## 2017-08-01 LAB
ALBUMIN SERPL-MCNC: 4 G/DL (ref 3.5–5.2)
ALBUMIN/GLOB SERPL: 1.5 G/DL (ref 1.1–2.4)
ALP SERPL-CCNC: 73 U/L (ref 38–116)
ALT SERPL W P-5'-P-CCNC: 28 U/L (ref 0–33)
ANION GAP SERPL CALCULATED.3IONS-SCNC: 14.8 MMOL/L
AST SERPL-CCNC: 25 U/L (ref 0–32)
BASOPHILS # BLD AUTO: 0.07 10*3/MM3 (ref 0–0.1)
BASOPHILS NFR BLD AUTO: 1 % (ref 0–1.1)
BILIRUB SERPL-MCNC: 0.3 MG/DL (ref 0.1–1.2)
BUN BLD-MCNC: 17 MG/DL (ref 6–20)
BUN/CREAT SERPL: 24.3 (ref 7.3–30)
CALCIUM SPEC-SCNC: 9.1 MG/DL (ref 8.5–10.2)
CHLORIDE SERPL-SCNC: 99 MMOL/L (ref 98–107)
CO2 SERPL-SCNC: 24.2 MMOL/L (ref 22–29)
CREAT BLD-MCNC: 0.7 MG/DL (ref 0.6–1.1)
DEPRECATED RDW RBC AUTO: 47.9 FL (ref 37–49)
EOSINOPHIL # BLD AUTO: 0.19 10*3/MM3 (ref 0–0.36)
EOSINOPHIL NFR BLD AUTO: 2.7 % (ref 1–5)
ERYTHROCYTE [DISTWIDTH] IN BLOOD BY AUTOMATED COUNT: 14.5 % (ref 11.7–14.5)
GFR SERPL CREATININE-BSD FRML MDRD: 80 ML/MIN/1.73
GLOBULIN UR ELPH-MCNC: 2.6 GM/DL (ref 1.8–3.5)
GLUCOSE BLD-MCNC: 100 MG/DL (ref 74–124)
HCT VFR BLD AUTO: 37.3 % (ref 34–45)
HGB BLD-MCNC: 12.6 G/DL (ref 11.5–14.9)
HOLD SPECIMEN: NORMAL
IMM GRANULOCYTES # BLD: 0.05 10*3/MM3 (ref 0–0.03)
IMM GRANULOCYTES NFR BLD: 0.7 % (ref 0–0.5)
LDH SERPL-CCNC: 189 U/L (ref 99–259)
LYMPHOCYTES # BLD AUTO: 0.41 10*3/MM3 (ref 1–3.5)
LYMPHOCYTES NFR BLD AUTO: 5.8 % (ref 20–49)
MCH RBC QN AUTO: 30.4 PG (ref 27–33)
MCHC RBC AUTO-ENTMCNC: 33.8 G/DL (ref 32–35)
MCV RBC AUTO: 89.9 FL (ref 83–97)
MONOCYTES # BLD AUTO: 0.7 10*3/MM3 (ref 0.25–0.8)
MONOCYTES NFR BLD AUTO: 9.8 % (ref 4–12)
NEUTROPHILS # BLD AUTO: 5.69 10*3/MM3 (ref 1.5–7)
NEUTROPHILS NFR BLD AUTO: 80 % (ref 39–75)
NRBC BLD MANUAL-RTO: 0 /100 WBC (ref 0–0)
PLATELET # BLD AUTO: 206 10*3/MM3 (ref 150–375)
PMV BLD AUTO: 8.9 FL (ref 8.9–12.1)
POTASSIUM BLD-SCNC: 3.6 MMOL/L (ref 3.5–4.7)
PROT SERPL-MCNC: 6.6 G/DL (ref 6.3–8)
RBC # BLD AUTO: 4.15 10*6/MM3 (ref 3.9–5)
SODIUM BLD-SCNC: 138 MMOL/L (ref 134–145)
WBC NRBC COR # BLD: 7.11 10*3/MM3 (ref 4–10)

## 2017-08-01 PROCEDURE — 25010000002 PALONOSETRON PER 25 MCG: Performed by: INTERNAL MEDICINE

## 2017-08-01 PROCEDURE — 25010000002 DIPHENHYDRAMINE PER 50 MG: Performed by: INTERNAL MEDICINE

## 2017-08-01 PROCEDURE — 25010000002 RITUXIMAB 10 MG/ML SOLUTION 10 ML VIAL: Performed by: INTERNAL MEDICINE

## 2017-08-01 PROCEDURE — 25010000002 DEXAMETHASONE SODIUM PHOSPHATE 10 MG/ML SOLUTION 1 ML VIAL: Performed by: INTERNAL MEDICINE

## 2017-08-01 PROCEDURE — 96415 CHEMO IV INFUSION ADDL HR: CPT | Performed by: INTERNAL MEDICINE

## 2017-08-01 PROCEDURE — 80053 COMPREHEN METABOLIC PANEL: CPT | Performed by: INTERNAL MEDICINE

## 2017-08-01 PROCEDURE — 85025 COMPLETE CBC W/AUTO DIFF WBC: CPT | Performed by: INTERNAL MEDICINE

## 2017-08-01 PROCEDURE — 25010000002 HYDROCORTISONE SODIUM SUCCINATE 100 MG RECONSTITUTED SOLUTION: Performed by: INTERNAL MEDICINE

## 2017-08-01 PROCEDURE — 96417 CHEMO IV INFUS EACH ADDL SEQ: CPT | Performed by: INTERNAL MEDICINE

## 2017-08-01 PROCEDURE — 83615 LACTATE (LD) (LDH) ENZYME: CPT | Performed by: INTERNAL MEDICINE

## 2017-08-01 PROCEDURE — 25010000002 RITUXIMAB 10 MG/ML SOLUTION 50 ML VIAL: Performed by: INTERNAL MEDICINE

## 2017-08-01 PROCEDURE — 25010000002 BENDAMUSTINE HCL 100 MG/4ML SOLUTION 4 ML VIAL: Performed by: INTERNAL MEDICINE

## 2017-08-01 PROCEDURE — 99212-NC PR NO CHARGE CBC OFFICE OUTPATIENT VISIT 10 MINUTES: Performed by: INTERNAL MEDICINE

## 2017-08-01 PROCEDURE — 96413 CHEMO IV INFUSION 1 HR: CPT | Performed by: INTERNAL MEDICINE

## 2017-08-01 PROCEDURE — 96375 TX/PRO/DX INJ NEW DRUG ADDON: CPT | Performed by: INTERNAL MEDICINE

## 2017-08-01 PROCEDURE — 36415 COLL VENOUS BLD VENIPUNCTURE: CPT | Performed by: INTERNAL MEDICINE

## 2017-08-01 RX ORDER — PALONOSETRON 0.05 MG/ML
0.25 INJECTION, SOLUTION INTRAVENOUS ONCE
Status: COMPLETED | OUTPATIENT
Start: 2017-08-01 | End: 2017-08-01

## 2017-08-01 RX ORDER — SODIUM CHLORIDE 9 MG/ML
250 INJECTION, SOLUTION INTRAVENOUS ONCE
Status: CANCELLED | OUTPATIENT
Start: 2017-08-02

## 2017-08-01 RX ORDER — FAMOTIDINE 10 MG/ML
20 INJECTION, SOLUTION INTRAVENOUS ONCE
Status: COMPLETED | OUTPATIENT
Start: 2017-08-01 | End: 2017-08-01

## 2017-08-01 RX ORDER — SODIUM CHLORIDE 9 MG/ML
250 INJECTION, SOLUTION INTRAVENOUS ONCE
Status: COMPLETED | OUTPATIENT
Start: 2017-08-01 | End: 2017-08-01

## 2017-08-01 RX ORDER — MEPERIDINE HYDROCHLORIDE 50 MG/ML
25 INJECTION INTRAMUSCULAR; INTRAVENOUS; SUBCUTANEOUS
Status: CANCELLED | OUTPATIENT
Start: 2017-08-02

## 2017-08-01 RX ORDER — FAMOTIDINE 10 MG/ML
20 INJECTION, SOLUTION INTRAVENOUS ONCE
Status: CANCELLED | OUTPATIENT
Start: 2017-08-02

## 2017-08-01 RX ORDER — ACETAMINOPHEN 325 MG/1
650 TABLET ORAL ONCE
Status: CANCELLED | OUTPATIENT
Start: 2017-08-02

## 2017-08-01 RX ORDER — DIPHENHYDRAMINE HYDROCHLORIDE 50 MG/ML
50 INJECTION INTRAMUSCULAR; INTRAVENOUS AS NEEDED
Status: CANCELLED | OUTPATIENT
Start: 2017-08-02

## 2017-08-01 RX ORDER — ACETAMINOPHEN 325 MG/1
650 TABLET ORAL ONCE
Status: COMPLETED | OUTPATIENT
Start: 2017-08-01 | End: 2017-08-01

## 2017-08-01 RX ORDER — FAMOTIDINE 10 MG/ML
20 INJECTION, SOLUTION INTRAVENOUS AS NEEDED
Status: CANCELLED | OUTPATIENT
Start: 2017-08-02

## 2017-08-01 RX ORDER — PALONOSETRON 0.05 MG/ML
0.25 INJECTION, SOLUTION INTRAVENOUS ONCE
Status: CANCELLED | OUTPATIENT
Start: 2017-08-02

## 2017-08-01 RX ADMIN — ACETAMINOPHEN 650 MG: 325 TABLET ORAL at 11:47

## 2017-08-01 RX ADMIN — DIPHENHYDRAMINE HYDROCHLORIDE 25 MG: 50 INJECTION, SOLUTION INTRAMUSCULAR; INTRAVENOUS at 11:53

## 2017-08-01 RX ADMIN — BENDAMUSTINE HYDROCHLORIDE 150 MG: 25 INJECTION, SOLUTION INTRAVENOUS at 12:27

## 2017-08-01 RX ADMIN — SODIUM CHLORIDE 250 ML: 900 INJECTION, SOLUTION INTRAVENOUS at 11:37

## 2017-08-01 RX ADMIN — HYDROCORTISONE SODIUM SUCCINATE 100 MG: 100 INJECTION, POWDER, FOR SOLUTION INTRAMUSCULAR; INTRAVENOUS at 11:52

## 2017-08-01 RX ADMIN — RITUXIMAB 630 MG: 10 INJECTION, SOLUTION INTRAVENOUS at 12:54

## 2017-08-01 RX ADMIN — PALONOSETRON HYDROCHLORIDE 0.25 MG: 0.25 INJECTION INTRAVENOUS at 11:48

## 2017-08-01 RX ADMIN — FAMOTIDINE 20 MG: 10 INJECTION, SOLUTION INTRAVENOUS at 11:49

## 2017-08-01 RX ADMIN — DEXAMETHASONE SODIUM PHOSPHATE 12 MG: 10 INJECTION, SOLUTION INTRAMUSCULAR; INTRAVENOUS at 12:11

## 2017-08-01 NOTE — PROGRESS NOTES
REASON FOR FOLLOW-UP:    1.  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  IPI 4 out of 5 = high risk.  2. Therapy with miniCHOP initiated on 5/5/2016, complete as of 8/19/2016.  (In retrospect, it appears she did not receive Rituxan).  Complete metabolic response to therapy by PET imaging.  3.  PET imaging on 3/22/2017 consistent with recurrence.  See below.    4.  Initiation of Rituxan weekly for 4 weeks on 4/3/2017.  Complete as of 4/25/2017.  5.  Initiation of bendamustine and Rituxan on 5/9/2017.    HISTORY OF PRESENT ILLNESS:  Guerita De La Fuente is a 84 y.o. female who returns today for follow up, anticipating cycle #4 of bendamustine and Rituxan.    We held the bendamustine with cycle #3 due to severe diarrhea.  She does continue cholestyramine but has not had any further issues with diarrhea.  She is not taking Imodium or Lomotil.  She did have a urinary tract infection with symptoms persistent after Macrobid.  She was prescribed a cephalosporin and her symptoms resolved.    She is taking prednisone 5 mg twice daily.  She requires an afternoon dose to maintain an appropriate energy level to finish the day.  She does continue to report some fatigue but overall is feeling significantly better compared with how she was feeling prior to beginning therapy.         ONCOLOGIC HISTORY:     Diffuse large B cell lymphoma    4/14/2016 Initial Diagnosis     Diffuse large B cell lymphoma by ultrasound-guided left supraclavicular lymph node biopsy.  Ki-67>95%.         4/28/2016 Imaging     PET scan:  There is fairly extensive lymphoma as described in detail  above. There are areas of intense metabolic activity localizing to the  spinal canal at the midthoracic level as well as low lumbar and sacral  levels. There is remarkably little detectable abnormality at these  levels by CT. MRI would be the best means for further delineation of  anatomic pathology. The degree  of activity certainly could indicate an  underlying mass and cord compression should be evaluated. Furthermore,  there is noted a very small nodular focus of uptake within the upper  inner quadrant of the left breast which appears to correspond to tiny  nodular foci seen at concurrent CT imaging. The finding is nonspecific,  however should be closely followed after appropriate therapy for  lymphoma to ensure resolution and to exclude a possible breast  malignancy.         4/29/2016 Surgery     Mediport placed by Dr. Mihir Arrieta.         5/5/2016 - 8/19/2016 Chemotherapy     R-miniCHOP         9/9/2016 Imaging     PET scan:  There has been a dramatic interval improvement. There has been  resolution of all of the previously seen intensely hypermetabolic  lesions involving the thorax, spine, retroperitoneum, and pelvis.      There is a new focus of hypermetabolic activity at the mid sigmoid colon  where there is mild acute diverticulitis, image 284.         3/22/2017 Imaging     PET scan:  IMPRESSION:  1. The tiny hypermetabolic nodes at the left axilla and left internal  mammary chain are similar to the hypermetabolic nodes seen on the PET/CT  from 04/28/2016. This suggests recurrence. The new hypermetabolic focus  within the left T2 lamina is concerning for a metastasis as well.  2. There is unusual hypermetabolic activity within the central aspect of  the right hepatic lobe corresponds to a much larger right portal vein  than on previous examinations. This suggests right portal vein  thrombosis. Further evaluation with contrast-enhanced CT of the abdomen  is recommended.  3. The symmetrical hypermetabolic activity throughout both lung fields  is of uncertain etiology as no airspace consolidations are present on  the corresponding CT. Extensive pneumonitis may have diffuse lung  activity, but the appearance on CT is not suggestive of pneumonitis. The  etiology of this finding is uncertain.         3/24/2017 Imaging  "    IMPRESSION:  Mildly enlarged bilateral axillary and mediastinal and left  internal mammary chain lymph nodes that showed mild hypermetabolism on a  recent PET/CT study consistent with recurrent lymphoma. No lung  abnormalities are identified to account for the low level abnormal  uptake noted on the PET/CT study. There is also no evidence of a lytic  or blastic lesion in the left lamina of T2 were the PET/CT study showed  focal increased activity. This does not exclude metastatic disease in  this location. Images of the abdomen and pelvis show no evidence of  recurrent lymphoma. There is no evidence of portal vein thrombosis.         4/3/2017 - 4/25/2017 Chemotherapy     OP LYMPHOMA (CLL) RiTUXimab (Weekly X 4)           5/9/2017 -  Chemotherapy     OP LYMPHOMA Bendamustine 90 mg/m2 / RiTUXimab 375 mg/m2           6/27/2017 Imaging     CT imaging with improved lymphadenopathy            PAST MEDICAL, SURGICAL, FAMILY, AND SOCIAL HISTORIES were reviewed with the patient and in the electronic medical record, and were updated if indicated.    ALLERGIES:  Allergies   Allergen Reactions   • Allopurinol    • Codeine GI Intolerance   • Levaquin [Levofloxacin]    • Shellfish-Derived Products Other (See Comments)     \"CRABS\" \"I CAN'T REMEMBER WHAT THE REACTION WAS\"   • Zofran [Ondansetron Hcl] Hives   • Penicillins Rash   • Sulfa Antibiotics Rash       MEDICATIONS:  The medication list has been reviewed with the patient by the medical assistant, and the list has been updated in the electronic medical record, which I reviewed.  Medication dosages and frequencies were confirmed to be accurate.    REVIEW OF SYSTEMS:  PAIN:  See Vital Signs below.  GENERAL:  See HPI  SKIN: No rash  HEME/LYMPH:  No abnormal bleeding.    EYES:  No vision changes or diplopia.  ENT:  No sore throat or difficulty swallowing.Tongue swelling.  RESPIRATORY:  No cough, shortness of breath, hemoptysis, or wheezing.  CARDIOVASCULAR:  No chest pain, " "palpitations, orthopnea.  Mild dyspnea on exertion.  GASTROINTESTINAL:  See history of present illness  GENITOURINARY:  See history of present illness  MUSCULOSKELETAL:  Some hip and back pain this morning which has improved  NEUROLOGIC:  Grade 1-2 peripheral neuropathy, stable.  PSYCHIATRIC:  No depression, anxiety, or mood changes.    Vitals:    08/01/17 0956   BP: 142/70   Pulse: 71   Resp: 16   Temp: 97.8 °F (36.6 °C)   TempSrc: Oral   SpO2: 98%   Weight: 135 lb 9.6 oz (61.5 kg)   Height: 65.5\" (166.4 cm)   PainSc: 0-No pain     ECOG 1    PHYSICAL EXAMINATION:  GENERAL:  Well-developed well-nourished female; awake, alert and oriented, in no acute distress.  SKIN:  No rash or nonhealing lesions  HEAD:  Normocephalic, atraumatic.  EYES:  Pupils equal, round and reactive to light.  Extraocular movements intact.  Conjunctivae normal.  EARS:  Hearing intact.  NOSE:  Septum midline.  No excoriations or nasal discharge.  MOUTH:  No stomatitis or ulcers.  Lips are normal.  No gingival erythema.  THROAT:  Oropharynx without lesions or exudates.  NECK:  Supple with good range of motion; no thyromegaly or masses; no JVD or bruits.  CHEST:  Lungs are clear to auscultation bilaterally.  No wheezes, rales, or rhonchi.  A Mediport is present in the right subclavian area that is benign in appearance.  HEART:  Regular rate and rhythm.  No murmurs, gallops or rubs.  ABDOMEN:  Soft, non-tender, non-distended.  Normal active bowel sounds.  No organomegaly.  EXTREMITIES:  No clubbing, cyanosis, or edema.  NEUROLOGICAL:  Mild decreased sensation to light touch present on her fingertips    DIAGNOSTIC DATA:  Lab Results   Component Value Date    WBC 7.11 08/01/2017    HGB 12.6 08/01/2017    HCT 37.3 08/01/2017    MCV 89.9 08/01/2017     08/01/2017       Chemistry        Component Value Date/Time     08/01/2017 0940    K 3.6 08/01/2017 0940    CL 99 08/01/2017 0940    CO2 24.2 08/01/2017 0940    BUN 17 08/01/2017 0940    " CREATININE 0.70 08/01/2017 0940    CREATININE 0.80 06/27/2017 0814        Component Value Date/Time    CALCIUM 9.1 08/01/2017 0940    ALKPHOS 73 08/01/2017 0940    AST 25 08/01/2017 0940    ALT 28 08/01/2017 0940    BILITOT 0.3 08/01/2017 0940          IMAGING:  CT imaging from 6/27/2017.  No lymphadenopathy.  Lungs and liver appear normal as they did on prior CT imaging.    ASSESSMENT:  This is a 84 y.o. female with:  1.  Stage IVb aggressive appearing diffuse large B-cell lymphoma.  IPI score was 4.  There was likely central nervous system involvement in the spinal canal.  She declined intrathecal therapy.  She completed 6 cycles of therapy with miniCHOP with a complete response.  On review of her chemotherapy regimen from 2016 it actually does not appear that she received Rituxan with her chemotherapy, although frankly this was intended.     The patient received Rituxan weekly ×4 beginning 4/3/2017.  She completed 4 weeks and tolerated this well with the addition of Solu-Cortef 100 mg for tongue swelling.      She initiated bendamustine and Rituxan 5/9/17, again with Solu-Cortef 100 mg as premedication.  She tolerated treatment overall well with exception of some constipation and continued nightly mild tongue swelling.      LDH has normalized now which is a good sign of response.    CT imaging shows an excellent response.  However, as discussed below, she was having worsening diarrhea.  We held bendamustine for cycle 3 and proceed with Rituxan alone.    Today her diarrhea is much better.  We will proceed with bendamustine and Rituxan but omit the bendamustine dose tomorrow.  If she tolerates this cycle well we can consider proceeding with both days of bendamustine with her next cycle.    2.  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    3.  Nausea: She did not complain of this today.     4.  Global weakness: Her weakness has improved. She continues prednisone 5 mg with breakfast and  5 mg with lunch.    5.  Tongue swelling: Continue Solu-Cortef at 100 mg with treatment.  Continue Benadryl at night.  She will notify us if this worsens.    6.  Diarrhea: Potentially chemotherapy related.  She is not taking Imodium and Lomotil but does continue the cholestyramine which is helping.  We will introduce the bendamustine again today but only give her today's dose and not tomorrow's.    7.  Urinary symptoms: She did have urinary tract infection and was treated with Macrobid and subsequently a cephalosporin with resolution.    PLAN:  1.  Proceed with bendamustine and Rituxan today.  2.  Omit the bendamustine or dose tomorrow  3.  Continue cholestyramine for diarrhea but she can consider stopping this to see if her diarrhea returns.  4.  Continue prednisone 5 mg twice daily  5.  Return in 4 weeks to consider her fifth cycle of therapy.  6.  We anticipate 6 cycles of therapy and we will pursue a PET scan after that.

## 2017-08-02 ENCOUNTER — APPOINTMENT (OUTPATIENT)
Dept: ONCOLOGY | Facility: HOSPITAL | Age: 82
End: 2017-08-02

## 2017-08-24 DIAGNOSIS — C83.30 DIFFUSE LARGE B-CELL LYMPHOMA, UNSPECIFIED BODY REGION (HCC): ICD-10-CM

## 2017-08-29 ENCOUNTER — INFUSION (OUTPATIENT)
Dept: ONCOLOGY | Facility: HOSPITAL | Age: 82
End: 2017-08-29

## 2017-08-29 ENCOUNTER — OFFICE VISIT (OUTPATIENT)
Dept: ONCOLOGY | Facility: CLINIC | Age: 82
End: 2017-08-29

## 2017-08-29 VITALS
SYSTOLIC BLOOD PRESSURE: 162 MMHG | RESPIRATION RATE: 18 BRPM | BODY MASS INDEX: 21.89 KG/M2 | DIASTOLIC BLOOD PRESSURE: 76 MMHG | HEIGHT: 66 IN | TEMPERATURE: 97.8 F | HEART RATE: 81 BPM | WEIGHT: 136.2 LBS | OXYGEN SATURATION: 98 %

## 2017-08-29 VITALS — HEART RATE: 80 BPM | SYSTOLIC BLOOD PRESSURE: 146 MMHG | DIASTOLIC BLOOD PRESSURE: 74 MMHG

## 2017-08-29 DIAGNOSIS — C83.30 DIFFUSE LARGE B-CELL LYMPHOMA, UNSPECIFIED BODY REGION (HCC): ICD-10-CM

## 2017-08-29 DIAGNOSIS — C83.30 DIFFUSE LARGE B-CELL LYMPHOMA, UNSPECIFIED BODY REGION (HCC): Primary | ICD-10-CM

## 2017-08-29 DIAGNOSIS — C83.34 DIFFUSE LARGE B-CELL LYMPHOMA OF LYMPH NODES OF UPPER EXTREMITY (HCC): Primary | ICD-10-CM

## 2017-08-29 LAB
ALBUMIN SERPL-MCNC: 4.1 G/DL (ref 3.5–5.2)
ALBUMIN/GLOB SERPL: 1.6 G/DL (ref 1.1–2.4)
ALP SERPL-CCNC: 73 U/L (ref 38–116)
ALT SERPL W P-5'-P-CCNC: 20 U/L (ref 0–33)
ANION GAP SERPL CALCULATED.3IONS-SCNC: 13 MMOL/L
AST SERPL-CCNC: 30 U/L (ref 0–32)
BASOPHILS # BLD AUTO: 0.07 10*3/MM3 (ref 0–0.1)
BASOPHILS NFR BLD AUTO: 1.2 % (ref 0–1.1)
BILIRUB SERPL-MCNC: 0.3 MG/DL (ref 0.1–1.2)
BUN BLD-MCNC: 15 MG/DL (ref 6–20)
BUN/CREAT SERPL: 20 (ref 7.3–30)
CALCIUM SPEC-SCNC: 9.3 MG/DL (ref 8.5–10.2)
CHLORIDE SERPL-SCNC: 104 MMOL/L (ref 98–107)
CO2 SERPL-SCNC: 24 MMOL/L (ref 22–29)
CREAT BLD-MCNC: 0.75 MG/DL (ref 0.6–1.1)
DEPRECATED RDW RBC AUTO: 45.3 FL (ref 37–49)
EOSINOPHIL # BLD AUTO: 0.13 10*3/MM3 (ref 0–0.36)
EOSINOPHIL NFR BLD AUTO: 2.2 % (ref 1–5)
ERYTHROCYTE [DISTWIDTH] IN BLOOD BY AUTOMATED COUNT: 13.9 % (ref 11.7–14.5)
GFR SERPL CREATININE-BSD FRML MDRD: 74 ML/MIN/1.73
GLOBULIN UR ELPH-MCNC: 2.6 GM/DL (ref 1.8–3.5)
GLUCOSE BLD-MCNC: 83 MG/DL (ref 74–124)
HCT VFR BLD AUTO: 36.7 % (ref 34–45)
HGB BLD-MCNC: 12.5 G/DL (ref 11.5–14.9)
HOLD SPECIMEN: NORMAL
IMM GRANULOCYTES # BLD: 0.03 10*3/MM3 (ref 0–0.03)
IMM GRANULOCYTES NFR BLD: 0.5 % (ref 0–0.5)
LDH SERPL-CCNC: 227 U/L (ref 99–259)
LYMPHOCYTES # BLD AUTO: 0.26 10*3/MM3 (ref 1–3.5)
LYMPHOCYTES NFR BLD AUTO: 4.4 % (ref 20–49)
MCH RBC QN AUTO: 30.5 PG (ref 27–33)
MCHC RBC AUTO-ENTMCNC: 34.1 G/DL (ref 32–35)
MCV RBC AUTO: 89.5 FL (ref 83–97)
MONOCYTES # BLD AUTO: 0.57 10*3/MM3 (ref 0.25–0.8)
MONOCYTES NFR BLD AUTO: 9.6 % (ref 4–12)
NEUTROPHILS # BLD AUTO: 4.88 10*3/MM3 (ref 1.5–7)
NEUTROPHILS NFR BLD AUTO: 82.1 % (ref 39–75)
NRBC BLD MANUAL-RTO: 0 /100 WBC (ref 0–0)
PLATELET # BLD AUTO: 202 10*3/MM3 (ref 150–375)
PMV BLD AUTO: 9.4 FL (ref 8.9–12.1)
POTASSIUM BLD-SCNC: 4 MMOL/L (ref 3.5–4.7)
PROT SERPL-MCNC: 6.7 G/DL (ref 6.3–8)
RBC # BLD AUTO: 4.1 10*6/MM3 (ref 3.9–5)
SODIUM BLD-SCNC: 141 MMOL/L (ref 134–145)
WBC NRBC COR # BLD: 5.94 10*3/MM3 (ref 4–10)

## 2017-08-29 PROCEDURE — 96415 CHEMO IV INFUSION ADDL HR: CPT | Performed by: INTERNAL MEDICINE

## 2017-08-29 PROCEDURE — 96411 CHEMO IV PUSH ADDL DRUG: CPT | Performed by: INTERNAL MEDICINE

## 2017-08-29 PROCEDURE — 96413 CHEMO IV INFUSION 1 HR: CPT | Performed by: INTERNAL MEDICINE

## 2017-08-29 PROCEDURE — 25010000002 RITUXIMAB 10 MG/ML SOLUTION 10 ML VIAL: Performed by: INTERNAL MEDICINE

## 2017-08-29 PROCEDURE — 80053 COMPREHEN METABOLIC PANEL: CPT

## 2017-08-29 PROCEDURE — 25010000002 DIPHENHYDRAMINE PER 50 MG: Performed by: INTERNAL MEDICINE

## 2017-08-29 PROCEDURE — 25010000002 HYDROCORTISONE SODIUM SUCCINATE 100 MG RECONSTITUTED SOLUTION: Performed by: INTERNAL MEDICINE

## 2017-08-29 PROCEDURE — 25010000002 BENDAMUSTINE HCL 100 MG/4ML SOLUTION 4 ML VIAL: Performed by: INTERNAL MEDICINE

## 2017-08-29 PROCEDURE — 83615 LACTATE (LD) (LDH) ENZYME: CPT | Performed by: INTERNAL MEDICINE

## 2017-08-29 PROCEDURE — 25010000002 PALONOSETRON PER 25 MCG: Performed by: INTERNAL MEDICINE

## 2017-08-29 PROCEDURE — 25010000002 RITUXIMAB 10 MG/ML SOLUTION 50 ML VIAL: Performed by: INTERNAL MEDICINE

## 2017-08-29 PROCEDURE — 96375 TX/PRO/DX INJ NEW DRUG ADDON: CPT | Performed by: INTERNAL MEDICINE

## 2017-08-29 PROCEDURE — 36415 COLL VENOUS BLD VENIPUNCTURE: CPT | Performed by: INTERNAL MEDICINE

## 2017-08-29 PROCEDURE — 99212-NC PR NO CHARGE CBC OFFICE OUTPATIENT VISIT 10 MINUTES: Performed by: INTERNAL MEDICINE

## 2017-08-29 PROCEDURE — 85025 COMPLETE CBC W/AUTO DIFF WBC: CPT

## 2017-08-29 PROCEDURE — 25010000002 DEXAMETHASONE PER 1 MG: Performed by: INTERNAL MEDICINE

## 2017-08-29 RX ORDER — SODIUM CHLORIDE 9 MG/ML
250 INJECTION, SOLUTION INTRAVENOUS ONCE
Status: CANCELLED | OUTPATIENT
Start: 2017-08-29

## 2017-08-29 RX ORDER — FAMOTIDINE 10 MG/ML
20 INJECTION, SOLUTION INTRAVENOUS AS NEEDED
Status: CANCELLED | OUTPATIENT
Start: 2017-08-29

## 2017-08-29 RX ORDER — FAMOTIDINE 10 MG/ML
20 INJECTION, SOLUTION INTRAVENOUS ONCE
Status: COMPLETED | OUTPATIENT
Start: 2017-08-29 | End: 2017-08-29

## 2017-08-29 RX ORDER — FAMOTIDINE 10 MG/ML
20 INJECTION, SOLUTION INTRAVENOUS ONCE
Status: CANCELLED | OUTPATIENT
Start: 2017-08-29

## 2017-08-29 RX ORDER — DIPHENHYDRAMINE HYDROCHLORIDE 50 MG/ML
50 INJECTION INTRAMUSCULAR; INTRAVENOUS AS NEEDED
Status: CANCELLED | OUTPATIENT
Start: 2017-08-29

## 2017-08-29 RX ORDER — SODIUM CHLORIDE 9 MG/ML
250 INJECTION, SOLUTION INTRAVENOUS ONCE
Status: COMPLETED | OUTPATIENT
Start: 2017-08-29 | End: 2017-08-29

## 2017-08-29 RX ORDER — ACETAMINOPHEN 325 MG/1
650 TABLET ORAL ONCE
Status: CANCELLED | OUTPATIENT
Start: 2017-08-29

## 2017-08-29 RX ORDER — PALONOSETRON 0.05 MG/ML
0.25 INJECTION, SOLUTION INTRAVENOUS ONCE
Status: CANCELLED | OUTPATIENT
Start: 2017-08-29

## 2017-08-29 RX ORDER — ACETAMINOPHEN 325 MG/1
650 TABLET ORAL ONCE
Status: COMPLETED | OUTPATIENT
Start: 2017-08-29 | End: 2017-08-29

## 2017-08-29 RX ORDER — MEPERIDINE HYDROCHLORIDE 50 MG/ML
25 INJECTION INTRAMUSCULAR; INTRAVENOUS; SUBCUTANEOUS
Status: CANCELLED | OUTPATIENT
Start: 2017-08-29

## 2017-08-29 RX ORDER — PALONOSETRON 0.05 MG/ML
0.25 INJECTION, SOLUTION INTRAVENOUS ONCE
Status: COMPLETED | OUTPATIENT
Start: 2017-08-29 | End: 2017-08-29

## 2017-08-29 RX ADMIN — DEXAMETHASONE SODIUM PHOSPHATE 12 MG: 10 INJECTION INTRAMUSCULAR; INTRAVENOUS at 10:16

## 2017-08-29 RX ADMIN — BENDAMUSTINE HYDROCHLORIDE 150 MG: 25 INJECTION, SOLUTION INTRAVENOUS at 11:16

## 2017-08-29 RX ADMIN — PALONOSETRON HYDROCHLORIDE 0.25 MG: 0.25 INJECTION INTRAVENOUS at 10:08

## 2017-08-29 RX ADMIN — SODIUM CHLORIDE 250 ML: 900 INJECTION, SOLUTION INTRAVENOUS at 10:08

## 2017-08-29 RX ADMIN — ACETAMINOPHEN 650 MG: 325 TABLET ORAL at 10:15

## 2017-08-29 RX ADMIN — HYDROCORTISONE SODIUM SUCCINATE 100 MG: 100 INJECTION, POWDER, FOR SOLUTION INTRAMUSCULAR; INTRAVENOUS at 10:12

## 2017-08-29 RX ADMIN — FAMOTIDINE 20 MG: 10 INJECTION, SOLUTION INTRAVENOUS at 10:10

## 2017-08-29 RX ADMIN — DIPHENHYDRAMINE HYDROCHLORIDE 25 MG: 50 INJECTION, SOLUTION INTRAMUSCULAR; INTRAVENOUS at 10:38

## 2017-08-29 RX ADMIN — RITUXIMAB 630 MG: 10 INJECTION, SOLUTION INTRAVENOUS at 11:35

## 2017-08-29 NOTE — PROGRESS NOTES
REASON FOR FOLLOW-UP:    1.  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  IPI 4 out of 5 = high risk.  2. Therapy with miniCHOP initiated on 5/5/2016, complete as of 8/19/2016.  (In retrospect, it appears she did not receive Rituxan).  Complete metabolic response to therapy by PET imaging.  3.  PET imaging on 3/22/2017 consistent with recurrence.  See below.    4.  Initiation of Rituxan weekly for 4 weeks on 4/3/2017.  Complete as of 4/25/2017.  5.  Initiation of bendamustine and Rituxan on 5/9/2017.    HISTORY OF PRESENT ILLNESS:  Guerita De La Fuente is a 84 y.o. female who returns today for follow up, anticipating cycle #5 of bendamustine and Rituxan.    We held the bendamustine with cycle #3 secondary to severe diarrhea.  We reintroduced the bendamustine with day 1 treatment only with cycle #4 that was completed 4 weeks ago.  She tolerated this well.  She states that she has minimal diarrhea.  She is eating well.  Her energy level is excellent.  She denies any other problems today.         ONCOLOGIC HISTORY:     Diffuse large B cell lymphoma    4/14/2016 Initial Diagnosis     Diffuse large B cell lymphoma by ultrasound-guided left supraclavicular lymph node biopsy.  Ki-67>95%.         4/28/2016 Imaging     PET scan:  There is fairly extensive lymphoma as described in detail  above. There are areas of intense metabolic activity localizing to the  spinal canal at the midthoracic level as well as low lumbar and sacral  levels. There is remarkably little detectable abnormality at these  levels by CT. MRI would be the best means for further delineation of  anatomic pathology. The degree of activity certainly could indicate an  underlying mass and cord compression should be evaluated. Furthermore,  there is noted a very small nodular focus of uptake within the upper  inner quadrant of the left breast which appears to correspond to tiny  nodular foci seen at  concurrent CT imaging. The finding is nonspecific,  however should be closely followed after appropriate therapy for  lymphoma to ensure resolution and to exclude a possible breast  malignancy.         4/29/2016 Surgery     Mediport placed by Dr. Mihir Arrieta.         5/5/2016 - 8/19/2016 Chemotherapy     R-miniCHOP         9/9/2016 Imaging     PET scan:  There has been a dramatic interval improvement. There has been  resolution of all of the previously seen intensely hypermetabolic  lesions involving the thorax, spine, retroperitoneum, and pelvis.      There is a new focus of hypermetabolic activity at the mid sigmoid colon  where there is mild acute diverticulitis, image 284.         3/22/2017 Imaging     PET scan:  IMPRESSION:  1. The tiny hypermetabolic nodes at the left axilla and left internal  mammary chain are similar to the hypermetabolic nodes seen on the PET/CT  from 04/28/2016. This suggests recurrence. The new hypermetabolic focus  within the left T2 lamina is concerning for a metastasis as well.  2. There is unusual hypermetabolic activity within the central aspect of  the right hepatic lobe corresponds to a much larger right portal vein  than on previous examinations. This suggests right portal vein  thrombosis. Further evaluation with contrast-enhanced CT of the abdomen  is recommended.  3. The symmetrical hypermetabolic activity throughout both lung fields  is of uncertain etiology as no airspace consolidations are present on  the corresponding CT. Extensive pneumonitis may have diffuse lung  activity, but the appearance on CT is not suggestive of pneumonitis. The  etiology of this finding is uncertain.         3/24/2017 Imaging     IMPRESSION:  Mildly enlarged bilateral axillary and mediastinal and left  internal mammary chain lymph nodes that showed mild hypermetabolism on a  recent PET/CT study consistent with recurrent lymphoma. No lung  abnormalities are identified to account for the low level  "abnormal  uptake noted on the PET/CT study. There is also no evidence of a lytic  or blastic lesion in the left lamina of T2 were the PET/CT study showed  focal increased activity. This does not exclude metastatic disease in  this location. Images of the abdomen and pelvis show no evidence of  recurrent lymphoma. There is no evidence of portal vein thrombosis.         4/3/2017 - 4/25/2017 Chemotherapy     OP LYMPHOMA (CLL) RiTUXimab (Weekly X 4)           5/9/2017 -  Chemotherapy     OP LYMPHOMA Bendamustine 90 mg/m2 / RiTUXimab 375 mg/m2           6/27/2017 Imaging     CT imaging with improved lymphadenopathy            PAST MEDICAL, SURGICAL, FAMILY, AND SOCIAL HISTORIES were reviewed with the patient and in the electronic medical record, and were updated if indicated.    ALLERGIES:  Allergies   Allergen Reactions   • Allopurinol    • Codeine GI Intolerance   • Levaquin [Levofloxacin]    • Shellfish-Derived Products Other (See Comments)     \"CRABS\" \"I CAN'T REMEMBER WHAT THE REACTION WAS\"   • Zofran [Ondansetron Hcl] Hives   • Penicillins Rash   • Sulfa Antibiotics Rash       MEDICATIONS:  The medication list has been reviewed with the patient by the medical assistant, and the list has been updated in the electronic medical record, which I reviewed.  Medication dosages and frequencies were confirmed to be accurate.    REVIEW OF SYSTEMS:  PAIN:  See Vital Signs below.  GENERAL:  See HPI  SKIN: No rash  HEME/LYMPH:  No abnormal bleeding.    EYES:  No vision changes or diplopia.  ENT:  No sore throat or difficulty swallowing.  RESPIRATORY:  No cough, shortness of breath, hemoptysis, or wheezing.  CARDIOVASCULAR:  No chest pain, palpitations, orthopnea.  Mild dyspnea on exertion.  GASTROINTESTINAL:  See history of present illness  GENITOURINARY:  Did not complain of urinary symptoms today  MUSCULOSKELETAL:  Some hip and back pain this morning which has improved  NEUROLOGIC:  Grade 1-2 peripheral neuropathy, " "stable.  PSYCHIATRIC:  No depression, anxiety, or mood changes.    Vitals:    08/29/17 0857   BP: 162/76   Pulse: 81   Resp: 18   Temp: 97.8 °F (36.6 °C)   TempSrc: Oral   SpO2: 98%   Weight: 136 lb 3.2 oz (61.8 kg)   Height: 65.5\" (166.4 cm)   PainSc: 0-No pain     ECOG 1    PHYSICAL EXAMINATION:  GENERAL:  Well-developed well-nourished female; awake, alert and oriented, in no acute distress.  SKIN:  No rash or nonhealing lesions  HEAD:  Normocephalic, atraumatic.  EYES:  Pupils equal, round and reactive to light.  Extraocular movements intact.  Conjunctivae normal.  EARS:  Hearing intact.  NOSE:  Septum midline.  No excoriations or nasal discharge.  MOUTH:  No stomatitis or ulcers.  Lips are normal.  No gingival erythema.  THROAT:  Oropharynx without lesions or exudates.  NECK:  Supple with good range of motion; no thyromegaly or masses; no JVD or bruits.  CHEST:  Lungs are clear to auscultation bilaterally.  No wheezes, rales, or rhonchi.  A Mediport is present in the right subclavian area that is benign in appearance.  HEART:  Regular rate and rhythm.  No murmurs, gallops or rubs.  ABDOMEN:  Soft, non-tender, non-distended.  Normal active bowel sounds.  No organomegaly.  EXTREMITIES:  No clubbing, cyanosis, or edema.  NEUROLOGICAL:  Mild decreased sensation to light touch present on her fingertips    DIAGNOSTIC DATA:  Lab Results   Component Value Date    WBC 5.94 08/29/2017    HGB 12.5 08/29/2017    HCT 36.7 08/29/2017    MCV 89.5 08/29/2017     08/29/2017       Chemistry        Component Value Date/Time     08/29/2017 0836    K 4.0 08/29/2017 0836     08/29/2017 0836    CO2 24.0 08/29/2017 0836    BUN 15 08/29/2017 0836    CREATININE 0.75 08/29/2017 0836    CREATININE 0.80 06/27/2017 0814        Component Value Date/Time    CALCIUM 9.3 08/29/2017 0836    ALKPHOS 73 08/29/2017 0836    AST 30 08/29/2017 0836    ALT 20 08/29/2017 0836    BILITOT 0.3 08/29/2017 0836          IMAGING:  CT imaging " from 6/27/2017.  No lymphadenopathy.  Lungs and liver appear normal as they did on prior CT imaging.    ASSESSMENT:  This is a 84 y.o. female with:  1.  Stage IVb aggressive appearing diffuse large B-cell lymphoma.  IPI score was 4.  There was likely central nervous system involvement in the spinal canal.  She declined intrathecal therapy.  She completed 6 cycles of therapy with miniCHOP with a complete response.  On review of her chemotherapy regimen from 2016 it actually does not appear that she received Rituxan with her chemotherapy, although frankly this was intended.     The patient received Rituxan weekly ×4 beginning 4/3/2017.  She completed 4 weeks and tolerated this well with the addition of Solu-Cortef 100 mg for tongue swelling.      She initiated bendamustine and Rituxan 5/9/17, again with Solu-Cortef 100 mg as premedication.  She tolerated treatment overall well with exception of some constipation and continued nightly mild tongue swelling.      LDH has normalized now which is a good sign of response.    CT imaging shows an excellent response.  However, as discussed below, she was having worsening diarrhea.  We held bendamustine for cycle 3 and proceeded with Rituxan alone.    With cycle #4 we reintroduced bendamustine with day 1 only and she tolerated this well.    We proceed with cycle #5 today at the same doses, again with bendamustine only on day 1.    2.  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    3.  Nausea: She did not complain of this today.     4.  Global weakness: Her weakness has improved. She continues prednisone 5 mg with breakfast and 5 mg with lunch, although over the past couple of days she has only taken 5 mg in the morning.    5.  Tongue swelling: Continue Solu-Cortef at 100 mg with treatment.  Continue Benadryl at night.  She will notify us if this worsens.    6.  Diarrhea: Likely chemotherapy related.  She tolerated bendamustine on day 1 well and  therefore we will proceed only with day 1 today and omit day 2 tomorrow.    7.  Urinary symptoms: She did have urinary tract infection and was treated with Macrobid and subsequently a cephalosporin with resolution.    PLAN:  1.  Proceed with bendamustine and Rituxan today.  2.  Omit the bendamustine dose tomorrow  3.  Continue prednisone 5 mg twice daily but she will decrease to once daily if tolerated  4.  Return in 4 weeks to consider her 6th cycle of therapy.  5.  We anticipate 6 cycles of therapy and we will pursue a PET scan after that.

## 2017-08-30 ENCOUNTER — APPOINTMENT (OUTPATIENT)
Dept: ONCOLOGY | Facility: HOSPITAL | Age: 82
End: 2017-08-30

## 2017-09-11 ENCOUNTER — CLINICAL SUPPORT (OUTPATIENT)
Dept: ONCOLOGY | Facility: HOSPITAL | Age: 82
End: 2017-09-11

## 2017-09-11 ENCOUNTER — APPOINTMENT (OUTPATIENT)
Dept: LAB | Facility: HOSPITAL | Age: 82
End: 2017-09-11

## 2017-09-11 DIAGNOSIS — R30.0 DYSURIA: Primary | ICD-10-CM

## 2017-09-11 LAB
BACTERIA UR QL AUTO: NEGATIVE /HPF
BASOPHILS # BLD AUTO: 0.07 10*3/MM3 (ref 0–0.1)
BASOPHILS NFR BLD AUTO: 0.8 % (ref 0–1.1)
BILIRUB UR QL STRIP: NEGATIVE
CLARITY UR: CLEAR
COLOR UR: ABNORMAL
DEPRECATED RDW RBC AUTO: 45.2 FL (ref 37–49)
EOSINOPHIL # BLD AUTO: 0.09 10*3/MM3 (ref 0–0.36)
EOSINOPHIL NFR BLD AUTO: 1 % (ref 1–5)
ERYTHROCYTE [DISTWIDTH] IN BLOOD BY AUTOMATED COUNT: 14.1 % (ref 11.7–14.5)
GLUCOSE UR STRIP-MCNC: NEGATIVE MG/DL
HCT VFR BLD AUTO: 37.9 % (ref 34–45)
HGB BLD-MCNC: 13.2 G/DL (ref 11.5–14.9)
HGB UR QL STRIP.AUTO: ABNORMAL
IMM GRANULOCYTES # BLD: 0.04 10*3/MM3 (ref 0–0.03)
IMM GRANULOCYTES NFR BLD: 0.4 % (ref 0–0.5)
KETONES UR QL STRIP: NEGATIVE
LEUKOCYTE ESTERASE UR QL STRIP.AUTO: ABNORMAL
LYMPHOCYTES # BLD AUTO: 0.18 10*3/MM3 (ref 1–3.5)
LYMPHOCYTES NFR BLD AUTO: 1.9 % (ref 20–49)
MCH RBC QN AUTO: 30.8 PG (ref 27–33)
MCHC RBC AUTO-ENTMCNC: 34.8 G/DL (ref 32–35)
MCV RBC AUTO: 88.3 FL (ref 83–97)
MONOCYTES # BLD AUTO: 0.75 10*3/MM3 (ref 0.25–0.8)
MONOCYTES NFR BLD AUTO: 8 % (ref 4–12)
NEUTROPHILS # BLD AUTO: 8.19 10*3/MM3 (ref 1.5–7)
NEUTROPHILS NFR BLD AUTO: 87.9 % (ref 39–75)
NITRITE UR QL STRIP: NEGATIVE
NRBC BLD MANUAL-RTO: 0 /100 WBC (ref 0–0)
PH UR STRIP.AUTO: <=5 [PH] (ref 4.5–8)
PLATELET # BLD AUTO: 201 10*3/MM3 (ref 150–375)
PMV BLD AUTO: 9.9 FL (ref 8.9–12.1)
PROT UR QL STRIP: NEGATIVE
RBC # BLD AUTO: 4.29 10*6/MM3 (ref 3.9–5)
RBC # UR: ABNORMAL /HPF
REF LAB TEST METHOD: ABNORMAL
SP GR UR STRIP: <=1.005 (ref 1–1.03)
SQUAMOUS #/AREA URNS HPF: ABNORMAL /HPF
UROBILINOGEN UR QL STRIP: ABNORMAL
WBC CLUMPS # UR AUTO: ABNORMAL /HPF
WBC NRBC COR # BLD: 9.32 10*3/MM3 (ref 4–10)
WBC UR QL AUTO: ABNORMAL /HPF

## 2017-09-11 PROCEDURE — 81001 URINALYSIS AUTO W/SCOPE: CPT | Performed by: INTERNAL MEDICINE

## 2017-09-11 PROCEDURE — 36416 COLLJ CAPILLARY BLOOD SPEC: CPT | Performed by: INTERNAL MEDICINE

## 2017-09-11 PROCEDURE — 87086 URINE CULTURE/COLONY COUNT: CPT | Performed by: INTERNAL MEDICINE

## 2017-09-11 PROCEDURE — 85025 COMPLETE CBC W/AUTO DIFF WBC: CPT | Performed by: INTERNAL MEDICINE

## 2017-09-11 PROCEDURE — 87186 SC STD MICRODIL/AGAR DIL: CPT | Performed by: INTERNAL MEDICINE

## 2017-09-11 RX ORDER — CEFPODOXIME PROXETIL 100 MG/1
TABLET, FILM COATED ORAL
Qty: 14 TABLET | Refills: 0 | OUTPATIENT
Start: 2017-09-11

## 2017-09-11 NOTE — PROGRESS NOTES
See previous RN note regarding symptoms.  UA and micro done.  CBC WNL.  Reviewed with DANIAL Storey.  Culture added to urine today and we will call pt with any positive culture results.  No abx prescribed at this time.  Pt feels like she is getting the UTI after chemo d/t forceful episodes of diarrhea.  She had previously been on Cefpodoxime Pro 100mg q12hrs x 7 days for her other UTI and she said this cleared up her symptoms.  Encouraged increased po fluids and copy of labs given.  Pt and family member v/u.

## 2017-09-11 NOTE — TELEPHONE ENCOUNTER
Pt calling because she feels that she has a UTI. She has an uncomfortable feeling when she urinates and slight back pain. She denies any burning with urination or fevers. Symptoms started yesterday. She was previously been on Vantin 100mg BID for a UTI back in July. Reviewed with Betsey Valdez NP. Per Betsey, have pt come in today for CBC, UA and RN review. Informed pt and she v/u. Orders placed and message sent to scheduling.

## 2017-09-13 ENCOUNTER — TELEPHONE (OUTPATIENT)
Dept: ONCOLOGY | Facility: CLINIC | Age: 82
End: 2017-09-13

## 2017-09-13 LAB — BACTERIA SPEC AEROBE CULT: ABNORMAL

## 2017-09-13 RX ORDER — CEFPODOXIME PROXETIL 100 MG/1
TABLET, FILM COATED ORAL
Qty: 14 TABLET | Refills: 0 | OUTPATIENT
Start: 2017-09-13 | End: 2017-09-24

## 2017-09-13 NOTE — TELEPHONE ENCOUNTER
Urine culture shown to rudy noriega np.  Will place pt. On vantin 100mg  1 po bid x 7 days.  Left message on pt. V/m at 10:10am to call office regarding this.

## 2017-09-14 NOTE — TELEPHONE ENCOUNTER
On call note:    Pt called stating that her pharmacy did not receive an antibiotic for her UTI.  Culture reviewed.  Pansensitive E.coli.  She states that the cefpodoxime she previously took worked, so I had her [facility] pharmacy fill this for her again.

## 2017-09-21 ENCOUNTER — TELEPHONE (OUTPATIENT)
Dept: ONCOLOGY | Facility: HOSPITAL | Age: 82
End: 2017-09-21

## 2017-09-21 RX ORDER — PREDNISONE 10 MG/1
TABLET ORAL
Qty: 60 TABLET | Refills: 0 | Status: SHIPPED | OUTPATIENT
Start: 2017-09-21 | End: 2017-10-23 | Stop reason: SDUPTHER

## 2017-09-21 NOTE — TELEPHONE ENCOUNTER
Received refill request from pharmacy for prednisone 10mg with directions to take twice daily. Dr. Ramírez's last note states pt is taking 5mg twice daily. Called pt to clarify and she states she is taking 10mg in the morning and 5mg at night. She states at times she will only take once a day depending on how she is feeling. Informed pt RN would approve refill for 10mg tablets and for her to call back with any complaints or concerns. Pt v/u. Refill approved.

## 2017-09-22 DIAGNOSIS — C83.30 DIFFUSE LARGE B-CELL LYMPHOMA, UNSPECIFIED BODY REGION (HCC): ICD-10-CM

## 2017-09-24 DIAGNOSIS — Z79.890 POSTMENOPAUSAL HRT (HORMONE REPLACEMENT THERAPY): ICD-10-CM

## 2017-09-24 RX ORDER — CEFPODOXIME PROXETIL 100 MG/1
100 TABLET, FILM COATED ORAL EVERY 12 HOURS
Qty: 14 TABLET | Refills: 0 | Status: SHIPPED | OUTPATIENT
Start: 2017-09-24 | End: 2017-10-01

## 2017-09-24 NOTE — PROGRESS NOTES
Patient's UTI seems to have returned. She has fever, dysuria and weakness. Daughter notes that patient had taken prior antibiotic incorrectly, accidentally only taking half of her dose on days 1-4. Will refill Vantin 100 mg bid for 7 days. If no improvement, may no evidence of disease to change therapy.

## 2017-09-25 RX ORDER — CONJUGATED ESTROGENS 0.3 MG/1
TABLET, FILM COATED ORAL
Qty: 90 TABLET | Refills: 0 | Status: SHIPPED | OUTPATIENT
Start: 2017-09-25 | End: 2017-10-13

## 2017-09-26 ENCOUNTER — INFUSION (OUTPATIENT)
Dept: ONCOLOGY | Facility: HOSPITAL | Age: 82
End: 2017-09-26

## 2017-09-26 ENCOUNTER — OFFICE VISIT (OUTPATIENT)
Dept: ONCOLOGY | Facility: CLINIC | Age: 82
End: 2017-09-26

## 2017-09-26 VITALS
WEIGHT: 139.2 LBS | BODY MASS INDEX: 22.37 KG/M2 | HEART RATE: 82 BPM | HEIGHT: 66 IN | RESPIRATION RATE: 18 BRPM | DIASTOLIC BLOOD PRESSURE: 70 MMHG | TEMPERATURE: 98.7 F | SYSTOLIC BLOOD PRESSURE: 132 MMHG

## 2017-09-26 VITALS — DIASTOLIC BLOOD PRESSURE: 76 MMHG | SYSTOLIC BLOOD PRESSURE: 138 MMHG | HEART RATE: 100 BPM

## 2017-09-26 DIAGNOSIS — C83.30 DIFFUSE LARGE B-CELL LYMPHOMA, UNSPECIFIED BODY REGION (HCC): ICD-10-CM

## 2017-09-26 DIAGNOSIS — C83.34 DIFFUSE LARGE B-CELL LYMPHOMA OF LYMPH NODES OF UPPER EXTREMITY (HCC): Primary | ICD-10-CM

## 2017-09-26 DIAGNOSIS — C83.30 DIFFUSE LARGE B-CELL LYMPHOMA, UNSPECIFIED BODY REGION (HCC): Primary | ICD-10-CM

## 2017-09-26 LAB
ALBUMIN SERPL-MCNC: 3.6 G/DL (ref 3.5–5.2)
ALBUMIN/GLOB SERPL: 1.4 G/DL (ref 1.1–2.4)
ALP SERPL-CCNC: 122 U/L (ref 38–116)
ALT SERPL W P-5'-P-CCNC: 55 U/L (ref 0–33)
ANION GAP SERPL CALCULATED.3IONS-SCNC: 12.9 MMOL/L
AST SERPL-CCNC: 259 U/L (ref 0–32)
BASOPHILS # BLD AUTO: 0.03 10*3/MM3 (ref 0–0.1)
BASOPHILS NFR BLD AUTO: 0.7 % (ref 0–1.1)
BILIRUB SERPL-MCNC: 0.4 MG/DL (ref 0.1–1.2)
BUN BLD-MCNC: 17 MG/DL (ref 6–20)
BUN/CREAT SERPL: 21 (ref 7.3–30)
CALCIUM SPEC-SCNC: 9.1 MG/DL (ref 8.5–10.2)
CHLORIDE SERPL-SCNC: 100 MMOL/L (ref 98–107)
CO2 SERPL-SCNC: 23.1 MMOL/L (ref 22–29)
CREAT BLD-MCNC: 0.81 MG/DL (ref 0.6–1.1)
DEPRECATED RDW RBC AUTO: 46.1 FL (ref 37–49)
EOSINOPHIL # BLD AUTO: 0.08 10*3/MM3 (ref 0–0.36)
EOSINOPHIL NFR BLD AUTO: 1.9 % (ref 1–5)
ERYTHROCYTE [DISTWIDTH] IN BLOOD BY AUTOMATED COUNT: 14.1 % (ref 11.7–14.5)
GFR SERPL CREATININE-BSD FRML MDRD: 67 ML/MIN/1.73
GLOBULIN UR ELPH-MCNC: 2.6 GM/DL (ref 1.8–3.5)
GLUCOSE BLD-MCNC: 108 MG/DL (ref 74–124)
HCT VFR BLD AUTO: 36 % (ref 34–45)
HGB BLD-MCNC: 12.3 G/DL (ref 11.5–14.9)
HOLD SPECIMEN: NORMAL
IMM GRANULOCYTES # BLD: 0.02 10*3/MM3 (ref 0–0.03)
IMM GRANULOCYTES NFR BLD: 0.5 % (ref 0–0.5)
LDH SERPL-CCNC: 1432 U/L (ref 99–259)
LYMPHOCYTES # BLD AUTO: 0.09 10*3/MM3 (ref 1–3.5)
LYMPHOCYTES NFR BLD AUTO: 2.1 % (ref 20–49)
MCH RBC QN AUTO: 30.6 PG (ref 27–33)
MCHC RBC AUTO-ENTMCNC: 34.2 G/DL (ref 32–35)
MCV RBC AUTO: 89.6 FL (ref 83–97)
MONOCYTES # BLD AUTO: 0.53 10*3/MM3 (ref 0.25–0.8)
MONOCYTES NFR BLD AUTO: 12.4 % (ref 4–12)
NEUTROPHILS # BLD AUTO: 3.51 10*3/MM3 (ref 1.5–7)
NEUTROPHILS NFR BLD AUTO: 82.4 % (ref 39–75)
NRBC BLD MANUAL-RTO: 0 /100 WBC (ref 0–0)
PLATELET # BLD AUTO: 106 10*3/MM3 (ref 150–375)
PMV BLD AUTO: 9.9 FL (ref 8.9–12.1)
POTASSIUM BLD-SCNC: 3.6 MMOL/L (ref 3.5–4.7)
PROT SERPL-MCNC: 6.2 G/DL (ref 6.3–8)
RBC # BLD AUTO: 4.02 10*6/MM3 (ref 3.9–5)
SODIUM BLD-SCNC: 136 MMOL/L (ref 134–145)
WBC NRBC COR # BLD: 4.26 10*3/MM3 (ref 4–10)

## 2017-09-26 PROCEDURE — 96413 CHEMO IV INFUSION 1 HR: CPT | Performed by: INTERNAL MEDICINE

## 2017-09-26 PROCEDURE — 36415 COLL VENOUS BLD VENIPUNCTURE: CPT | Performed by: INTERNAL MEDICINE

## 2017-09-26 PROCEDURE — 83615 LACTATE (LD) (LDH) ENZYME: CPT | Performed by: INTERNAL MEDICINE

## 2017-09-26 PROCEDURE — 25010000002 HYDROCORTISONE SODIUM SUCCINATE 100 MG RECONSTITUTED SOLUTION: Performed by: INTERNAL MEDICINE

## 2017-09-26 PROCEDURE — 96375 TX/PRO/DX INJ NEW DRUG ADDON: CPT | Performed by: INTERNAL MEDICINE

## 2017-09-26 PROCEDURE — 85025 COMPLETE CBC W/AUTO DIFF WBC: CPT | Performed by: INTERNAL MEDICINE

## 2017-09-26 PROCEDURE — 25010000002 PALONOSETRON PER 25 MCG: Performed by: INTERNAL MEDICINE

## 2017-09-26 PROCEDURE — 99214 OFFICE O/P EST MOD 30 MIN: CPT | Performed by: INTERNAL MEDICINE

## 2017-09-26 PROCEDURE — 25010000002 DEXAMETHASONE PER 1 MG: Performed by: INTERNAL MEDICINE

## 2017-09-26 PROCEDURE — 25010000002 RITUXIMAB 10 MG/ML SOLUTION 50 ML VIAL: Performed by: INTERNAL MEDICINE

## 2017-09-26 PROCEDURE — 25010000002 BENDAMUSTINE HCL 100 MG/4ML SOLUTION 4 ML VIAL: Performed by: INTERNAL MEDICINE

## 2017-09-26 PROCEDURE — 25010000002 DIPHENHYDRAMINE PER 50 MG: Performed by: INTERNAL MEDICINE

## 2017-09-26 PROCEDURE — 80053 COMPREHEN METABOLIC PANEL: CPT | Performed by: INTERNAL MEDICINE

## 2017-09-26 PROCEDURE — 96411 CHEMO IV PUSH ADDL DRUG: CPT | Performed by: INTERNAL MEDICINE

## 2017-09-26 PROCEDURE — 96415 CHEMO IV INFUSION ADDL HR: CPT | Performed by: INTERNAL MEDICINE

## 2017-09-26 PROCEDURE — 25010000002 RITUXIMAB 10 MG/ML SOLUTION 10 ML VIAL: Performed by: INTERNAL MEDICINE

## 2017-09-26 RX ORDER — FAMOTIDINE 10 MG/ML
20 INJECTION, SOLUTION INTRAVENOUS ONCE
Status: CANCELLED | OUTPATIENT
Start: 2017-09-26

## 2017-09-26 RX ORDER — SODIUM CHLORIDE 9 MG/ML
250 INJECTION, SOLUTION INTRAVENOUS ONCE
Status: CANCELLED | OUTPATIENT
Start: 2017-09-26

## 2017-09-26 RX ORDER — DIPHENHYDRAMINE HYDROCHLORIDE 50 MG/ML
50 INJECTION INTRAMUSCULAR; INTRAVENOUS AS NEEDED
Status: CANCELLED | OUTPATIENT
Start: 2017-09-26

## 2017-09-26 RX ORDER — PALONOSETRON 0.05 MG/ML
0.25 INJECTION, SOLUTION INTRAVENOUS ONCE
Status: CANCELLED | OUTPATIENT
Start: 2017-09-26

## 2017-09-26 RX ORDER — PALONOSETRON 0.05 MG/ML
0.25 INJECTION, SOLUTION INTRAVENOUS ONCE
Status: COMPLETED | OUTPATIENT
Start: 2017-09-26 | End: 2017-09-26

## 2017-09-26 RX ORDER — SODIUM CHLORIDE 9 MG/ML
250 INJECTION, SOLUTION INTRAVENOUS ONCE
Status: COMPLETED | OUTPATIENT
Start: 2017-09-26 | End: 2017-09-26

## 2017-09-26 RX ORDER — ACETAMINOPHEN 325 MG/1
650 TABLET ORAL ONCE
Status: COMPLETED | OUTPATIENT
Start: 2017-09-26 | End: 2017-09-26

## 2017-09-26 RX ORDER — FAMOTIDINE 10 MG/ML
20 INJECTION, SOLUTION INTRAVENOUS AS NEEDED
Status: CANCELLED | OUTPATIENT
Start: 2017-09-26

## 2017-09-26 RX ORDER — ACETAMINOPHEN 325 MG/1
650 TABLET ORAL ONCE
Status: CANCELLED | OUTPATIENT
Start: 2017-09-26

## 2017-09-26 RX ORDER — FAMOTIDINE 10 MG/ML
20 INJECTION, SOLUTION INTRAVENOUS ONCE
Status: COMPLETED | OUTPATIENT
Start: 2017-09-26 | End: 2017-09-26

## 2017-09-26 RX ORDER — MEPERIDINE HYDROCHLORIDE 50 MG/ML
25 INJECTION INTRAMUSCULAR; INTRAVENOUS; SUBCUTANEOUS
Status: CANCELLED | OUTPATIENT
Start: 2017-09-26

## 2017-09-26 RX ADMIN — ACETAMINOPHEN 650 MG: 325 TABLET ORAL at 09:12

## 2017-09-26 RX ADMIN — DEXAMETHASONE SODIUM PHOSPHATE 12 MG: 10 INJECTION INTRAMUSCULAR; INTRAVENOUS at 09:17

## 2017-09-26 RX ADMIN — HYDROCORTISONE SODIUM SUCCINATE 100 MG: 100 INJECTION, POWDER, FOR SOLUTION INTRAMUSCULAR; INTRAVENOUS at 09:16

## 2017-09-26 RX ADMIN — RITUXIMAB 630 MG: 10 INJECTION, SOLUTION INTRAVENOUS at 10:26

## 2017-09-26 RX ADMIN — SODIUM CHLORIDE 250 ML: 900 INJECTION, SOLUTION INTRAVENOUS at 09:05

## 2017-09-26 RX ADMIN — PALONOSETRON HYDROCHLORIDE 0.25 MG: 0.25 INJECTION INTRAVENOUS at 09:14

## 2017-09-26 RX ADMIN — DIPHENHYDRAMINE HYDROCHLORIDE 25 MG: 50 INJECTION, SOLUTION INTRAMUSCULAR; INTRAVENOUS at 09:33

## 2017-09-26 RX ADMIN — FAMOTIDINE 20 MG: 10 INJECTION, SOLUTION INTRAVENOUS at 09:12

## 2017-09-26 RX ADMIN — BENDAMUSTINE HYDROCHLORIDE 150 MG: 25 INJECTION, SOLUTION INTRAVENOUS at 10:12

## 2017-09-26 NOTE — PROGRESS NOTES
REASON FOR FOLLOW-UP:    1.  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  IPI 4 out of 5 = high risk.  2. Therapy with miniCHOP initiated on 5/5/2016, complete as of 8/19/2016.  (In retrospect, it appears she did not receive Rituxan).  Complete metabolic response to therapy by PET imaging.  3.  PET imaging on 3/22/2017 consistent with recurrence.  See below.    4.  Initiation of Rituxan weekly for 4 weeks on 4/3/2017.  Complete as of 4/25/2017.  5.  Initiation of bendamustine and Rituxan on 5/9/2017.    HISTORY OF PRESENT ILLNESS:  Guerita De La Fuente is a 84 y.o. female who returns today for follow up, anticipating cycle #6 of bendamustine and Rituxan.    We held the bendamustine with cycle #3 secondary to severe diarrhea.  We reintroduced the bendamustine with day 1 treatment only with cycle #4.    She has been tolerating therapy well.  She has intermittent diarrhea related to chemotherapy.  This has led to recurrent urinary tract infections.  She is currently completing antibiotics for recurrent symptoms.  She had fever and malaise last week but this is improving on antibiotics.  No lymphadenopathy.         ONCOLOGIC HISTORY:     Diffuse large B cell lymphoma    4/14/2016 Initial Diagnosis     Diffuse large B cell lymphoma by ultrasound-guided left supraclavicular lymph node biopsy.  Ki-67>95%.         4/28/2016 Imaging     PET scan:  There is fairly extensive lymphoma as described in detail  above. There are areas of intense metabolic activity localizing to the  spinal canal at the midthoracic level as well as low lumbar and sacral  levels. There is remarkably little detectable abnormality at these  levels by CT. MRI would be the best means for further delineation of  anatomic pathology. The degree of activity certainly could indicate an  underlying mass and cord compression should be evaluated. Furthermore,  there is noted a very small nodular focus of  uptake within the upper  inner quadrant of the left breast which appears to correspond to tiny  nodular foci seen at concurrent CT imaging. The finding is nonspecific,  however should be closely followed after appropriate therapy for  lymphoma to ensure resolution and to exclude a possible breast  malignancy.         4/29/2016 Surgery     Mediport placed by Dr. Mihir Arrieta.         5/5/2016 - 8/19/2016 Chemotherapy     R-miniCHOP         9/9/2016 Imaging     PET scan:  There has been a dramatic interval improvement. There has been  resolution of all of the previously seen intensely hypermetabolic  lesions involving the thorax, spine, retroperitoneum, and pelvis.      There is a new focus of hypermetabolic activity at the mid sigmoid colon  where there is mild acute diverticulitis, image 284.         3/22/2017 Imaging     PET scan:  IMPRESSION:  1. The tiny hypermetabolic nodes at the left axilla and left internal  mammary chain are similar to the hypermetabolic nodes seen on the PET/CT  from 04/28/2016. This suggests recurrence. The new hypermetabolic focus  within the left T2 lamina is concerning for a metastasis as well.  2. There is unusual hypermetabolic activity within the central aspect of  the right hepatic lobe corresponds to a much larger right portal vein  than on previous examinations. This suggests right portal vein  thrombosis. Further evaluation with contrast-enhanced CT of the abdomen  is recommended.  3. The symmetrical hypermetabolic activity throughout both lung fields  is of uncertain etiology as no airspace consolidations are present on  the corresponding CT. Extensive pneumonitis may have diffuse lung  activity, but the appearance on CT is not suggestive of pneumonitis. The  etiology of this finding is uncertain.         3/24/2017 Imaging     IMPRESSION:  Mildly enlarged bilateral axillary and mediastinal and left  internal mammary chain lymph nodes that showed mild hypermetabolism on a  recent  "PET/CT study consistent with recurrent lymphoma. No lung  abnormalities are identified to account for the low level abnormal  uptake noted on the PET/CT study. There is also no evidence of a lytic  or blastic lesion in the left lamina of T2 were the PET/CT study showed  focal increased activity. This does not exclude metastatic disease in  this location. Images of the abdomen and pelvis show no evidence of  recurrent lymphoma. There is no evidence of portal vein thrombosis.         4/3/2017 - 4/25/2017 Chemotherapy     OP LYMPHOMA (CLL) RiTUXimab (Weekly X 4)           5/9/2017 -  Chemotherapy     OP LYMPHOMA Bendamustine 90 mg/m2 / RiTUXimab 375 mg/m2           6/27/2017 Imaging     CT imaging with improved lymphadenopathy            PAST MEDICAL, SURGICAL, FAMILY, AND SOCIAL HISTORIES were reviewed with the patient and in the electronic medical record, and were updated if indicated.    ALLERGIES:  Allergies   Allergen Reactions   • Allopurinol    • Codeine GI Intolerance   • Levaquin [Levofloxacin]    • Shellfish-Derived Products Other (See Comments)     \"CRABS\" \"I CAN'T REMEMBER WHAT THE REACTION WAS\"   • Zofran [Ondansetron Hcl] Hives   • Penicillins Rash   • Sulfa Antibiotics Rash       MEDICATIONS:  The medication list has been reviewed with the patient by the medical assistant, and the list has been updated in the electronic medical record, which I reviewed.  Medication dosages and frequencies were confirmed to be accurate.    REVIEW OF SYSTEMS:  PAIN:  See Vital Signs below.  GENERAL:  See HPI  SKIN: No rash  HEME/LYMPH:  No abnormal bleeding.    EYES:  No vision changes or diplopia.  ENT:  No sore throat or difficulty swallowing.  RESPIRATORY:  No cough, shortness of breath, hemoptysis, or wheezing.  CARDIOVASCULAR:  No chest pain, palpitations, orthopnea.  Mild dyspnea on exertion.  GASTROINTESTINAL:  See history of present illness  GENITOURINARY:  See history of present illness  MUSCULOSKELETAL:  Some hip " "and back pain this morning which has improved  NEUROLOGIC:  Grade 1-2 peripheral neuropathy, stable.  PSYCHIATRIC:  No depression, anxiety, or mood changes.    Vitals:    09/26/17 0812   BP: 132/70   Pulse: 82   Resp: 18   Temp: 98.7 °F (37.1 °C)   Weight: 139 lb 3.2 oz (63.1 kg)   Height: 65.5\" (166.4 cm)   PainSc: 0-No pain     ECOG 1    PHYSICAL EXAMINATION:  GENERAL:  Well-developed well-nourished female; awake, alert and oriented, in no acute distress.  SKIN:  No rash or nonhealing lesions  HEAD:  Normocephalic, atraumatic.  EYES:  Pupils equal, round and reactive to light.  Extraocular movements intact.  Conjunctivae normal.  EARS:  Hearing intact.  NOSE:  Septum midline.  No excoriations or nasal discharge.  MOUTH:  No stomatitis or ulcers.  Lips are normal.  No gingival erythema.  THROAT:  Oropharynx without lesions or exudates.  NECK:  Supple with good range of motion; no thyromegaly or masses; no JVD or bruits.  CHEST:  Lungs are clear to auscultation bilaterally.  No wheezes, rales, or rhonchi.  A Mediport is present in the right subclavian area that is benign in appearance.  HEART:  Regular rate and rhythm.  No murmurs, gallops or rubs.  ABDOMEN:  Soft, non-tender, non-distended.  Normal active bowel sounds.  No organomegaly.  EXTREMITIES:  No clubbing, cyanosis, or edema.  NEUROLOGICAL:  Mild decreased sensation to light touch present on her fingertips    DIAGNOSTIC DATA:  Lab Results   Component Value Date    WBC 4.26 09/26/2017    HGB 12.3 09/26/2017    HCT 36.0 09/26/2017    MCV 89.6 09/26/2017     (L) 09/26/2017       Chemistry        Component Value Date/Time     08/29/2017 0836    K 4.0 08/29/2017 0836     08/29/2017 0836    CO2 24.0 08/29/2017 0836    BUN 15 08/29/2017 0836    CREATININE 0.75 08/29/2017 0836    CREATININE 0.80 06/27/2017 0814        Component Value Date/Time    CALCIUM 9.3 08/29/2017 0836    ALKPHOS 73 08/29/2017 0836    AST 30 08/29/2017 0836    ALT 20 " 08/29/2017 0836    BILITOT 0.3 08/29/2017 0836          IMAGING:  CT imaging from 6/27/2017.  No lymphadenopathy.  Lungs and liver appear normal as they did on prior CT imaging.    ASSESSMENT:  This is a 84 y.o. female with:  1.  Stage IVb aggressive appearing diffuse large B-cell lymphoma.  IPI score was 4.  There was likely central nervous system involvement in the spinal canal.  She declined intrathecal therapy.  She completed 6 cycles of therapy with miniCHOP with a complete response.  On review of her chemotherapy regimen from 2016 it actually does not appear that she received Rituxan with her chemotherapy, although frankly this was intended.     The patient received Rituxan weekly ×4 beginning 4/3/2017.  She completed 4 weeks and tolerated this well with the addition of Solu-Cortef 100 mg for tongue swelling.      She initiated bendamustine and Rituxan 5/9/17, again with Solu-Cortef 100 mg as premedication.  She tolerated treatment overall well with exception of some constipation and continued nightly mild tongue swelling.      LDH had normalized which was a good sign of response; however, her LDH is now elevated today of uncertain significance in the setting of recent urinary tract infection.    CT imaging showed an excellent response.  However, as discussed below, she was having worsening diarrhea.  We held bendamustine for cycle 3 and proceeded with Rituxan alone.    With cycle #4 we reintroduced bendamustine with day 1 only and she tolerated this well.    We now proceed with cycle #6 which will be her final cycle of therapy.    2.  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    3.  Nausea: She did not complain of this today.     4.  Global weakness: Her weakness has improved. She continues prednisone twice daily.    5.  Tongue swelling: Continue Solu-Cortef at 100 mg with treatment.  Continue Benadryl at night.  She will notify us if this worsens.    6.  Diarrhea: Likely  chemotherapy related.  She tolerated bendamustine on day 1 well and therefore we will proceed only with day 1 today and omit day 2 tomorrow.    7.  Urinary symptoms: Recurrent urinary tract infection.  Currently on therapy with cefpodoxime.  Hopefully this will resolve as her diarrhea resolves with completion of chemotherapy.  If she continues to have urinary tract infections she may need to see urology or gynecology.    PLAN:  1.  Proceed with bendamustine and Rituxan today.  2.  Omit the bendamustine dose tomorrow  3.  Continue prednisone  4.  She will finish her antibiotics  5.  We will obtain a PET scan and labs in about a month and I will see her back after that to review the imaging.    We discussed more today than her chemotherapy and side effects.  We discussed her urinary tract infection.

## 2017-09-26 NOTE — PROGRESS NOTES
CMP and LDH results reviewed with Dr. Ramírez. Per Dr. Ramírez, ok to proceed with treatment today. Pt will have scans and f/u MD appt as ordered by Dr. Ramírez today.

## 2017-10-13 ENCOUNTER — LAB (OUTPATIENT)
Dept: ONCOLOGY | Facility: HOSPITAL | Age: 82
End: 2017-10-13

## 2017-10-13 ENCOUNTER — OFFICE VISIT (OUTPATIENT)
Dept: OBSTETRICS AND GYNECOLOGY | Facility: CLINIC | Age: 82
End: 2017-10-13

## 2017-10-13 ENCOUNTER — OFFICE VISIT (OUTPATIENT)
Dept: ONCOLOGY | Facility: CLINIC | Age: 82
End: 2017-10-13

## 2017-10-13 VITALS
SYSTOLIC BLOOD PRESSURE: 154 MMHG | HEART RATE: 93 BPM | TEMPERATURE: 97.5 F | OXYGEN SATURATION: 95 % | RESPIRATION RATE: 18 BRPM | DIASTOLIC BLOOD PRESSURE: 80 MMHG | BODY MASS INDEX: 22.53 KG/M2 | WEIGHT: 140.2 LBS | HEIGHT: 66 IN

## 2017-10-13 VITALS
DIASTOLIC BLOOD PRESSURE: 66 MMHG | WEIGHT: 139.6 LBS | SYSTOLIC BLOOD PRESSURE: 150 MMHG | HEIGHT: 66 IN | BODY MASS INDEX: 22.43 KG/M2

## 2017-10-13 DIAGNOSIS — Z79.890 POSTMENOPAUSAL HRT (HORMONE REPLACEMENT THERAPY): Primary | ICD-10-CM

## 2017-10-13 DIAGNOSIS — C83.34 DIFFUSE LARGE B-CELL LYMPHOMA OF LYMPH NODES OF UPPER EXTREMITY (HCC): Primary | ICD-10-CM

## 2017-10-13 DIAGNOSIS — Z45.2 FITTING AND ADJUSTMENT OF VASCULAR CATHETER: ICD-10-CM

## 2017-10-13 LAB
ALBUMIN SERPL-MCNC: 3.4 G/DL (ref 3.5–5.2)
ALBUMIN/GLOB SERPL: 1.3 G/DL (ref 1.1–2.4)
ALP SERPL-CCNC: 176 U/L (ref 38–116)
ALT SERPL W P-5'-P-CCNC: 75 U/L (ref 0–33)
ANION GAP SERPL CALCULATED.3IONS-SCNC: 14.4 MMOL/L
AST SERPL-CCNC: 319 U/L (ref 0–32)
BASOPHILS # BLD AUTO: 0.01 10*3/MM3 (ref 0–0.1)
BASOPHILS NFR BLD AUTO: 0.2 % (ref 0–1.1)
BILIRUB SERPL-MCNC: 0.4 MG/DL (ref 0.1–1.2)
BUN BLD-MCNC: 17 MG/DL (ref 6–20)
BUN/CREAT SERPL: 25 (ref 7.3–30)
CALCIUM SPEC-SCNC: 8.7 MG/DL (ref 8.5–10.2)
CHLORIDE SERPL-SCNC: 98 MMOL/L (ref 98–107)
CO2 SERPL-SCNC: 20.6 MMOL/L (ref 22–29)
CREAT BLD-MCNC: 0.68 MG/DL (ref 0.6–1.1)
DEPRECATED RDW RBC AUTO: 48.3 FL (ref 37–49)
EOSINOPHIL # BLD AUTO: 0.01 10*3/MM3 (ref 0–0.36)
EOSINOPHIL NFR BLD AUTO: 0.2 % (ref 1–5)
ERYTHROCYTE [DISTWIDTH] IN BLOOD BY AUTOMATED COUNT: 14.7 % (ref 11.7–14.5)
GFR SERPL CREATININE-BSD FRML MDRD: 82 ML/MIN/1.73
GLOBULIN UR ELPH-MCNC: 2.6 GM/DL (ref 1.8–3.5)
GLUCOSE BLD-MCNC: 188 MG/DL (ref 74–124)
HCT VFR BLD AUTO: 34.9 % (ref 34–45)
HGB BLD-MCNC: 11.6 G/DL (ref 11.5–14.9)
HOLD SPECIMEN: NORMAL
IMM GRANULOCYTES # BLD: 0.03 10*3/MM3 (ref 0–0.03)
IMM GRANULOCYTES NFR BLD: 0.7 % (ref 0–0.5)
LDH SERPL-CCNC: 2126 U/L (ref 99–259)
LYMPHOCYTES # BLD AUTO: 0.25 10*3/MM3 (ref 1–3.5)
LYMPHOCYTES NFR BLD AUTO: 5.8 % (ref 20–49)
MCH RBC QN AUTO: 29.7 PG (ref 27–33)
MCHC RBC AUTO-ENTMCNC: 33.2 G/DL (ref 32–35)
MCV RBC AUTO: 89.3 FL (ref 83–97)
MONOCYTES # BLD AUTO: 0.71 10*3/MM3 (ref 0.25–0.8)
MONOCYTES NFR BLD AUTO: 16.6 % (ref 4–12)
NEUTROPHILS # BLD AUTO: 3.28 10*3/MM3 (ref 1.5–7)
NEUTROPHILS NFR BLD AUTO: 76.5 % (ref 39–75)
NRBC BLD MANUAL-RTO: 0 /100 WBC (ref 0–0)
PLATELET # BLD AUTO: 69 10*3/MM3 (ref 150–375)
PMV BLD AUTO: 10.3 FL (ref 8.9–12.1)
POTASSIUM BLD-SCNC: 3.9 MMOL/L (ref 3.5–4.7)
PROT SERPL-MCNC: 6 G/DL (ref 6.3–8)
RBC # BLD AUTO: 3.91 10*6/MM3 (ref 3.9–5)
SODIUM BLD-SCNC: 133 MMOL/L (ref 134–145)
URATE SERPL-MCNC: 3.3 MG/DL (ref 2.8–7.4)
WBC NRBC COR # BLD: 4.29 10*3/MM3 (ref 4–10)

## 2017-10-13 PROCEDURE — 85025 COMPLETE CBC W/AUTO DIFF WBC: CPT | Performed by: NURSE PRACTITIONER

## 2017-10-13 PROCEDURE — 84550 ASSAY OF BLOOD/URIC ACID: CPT | Performed by: NURSE PRACTITIONER

## 2017-10-13 PROCEDURE — 83615 LACTATE (LD) (LDH) ENZYME: CPT | Performed by: NURSE PRACTITIONER

## 2017-10-13 PROCEDURE — 80053 COMPREHEN METABOLIC PANEL: CPT | Performed by: NURSE PRACTITIONER

## 2017-10-13 PROCEDURE — 25010000002 HEPARIN FLUSH (PORCINE) 100 UNIT/ML SOLUTION: Performed by: INTERNAL MEDICINE

## 2017-10-13 PROCEDURE — 99214 OFFICE O/P EST MOD 30 MIN: CPT | Performed by: NURSE PRACTITIONER

## 2017-10-13 PROCEDURE — 96523 IRRIG DRUG DELIVERY DEVICE: CPT | Performed by: NURSE PRACTITIONER

## 2017-10-13 PROCEDURE — 99213 OFFICE O/P EST LOW 20 MIN: CPT | Performed by: OBSTETRICS & GYNECOLOGY

## 2017-10-13 RX ORDER — HEPARIN SODIUM (PORCINE) LOCK FLUSH IV SOLN 100 UNIT/ML 100 UNIT/ML
500 SOLUTION INTRAVENOUS AS NEEDED
OUTPATIENT
Start: 2017-10-13

## 2017-10-13 RX ORDER — SODIUM CHLORIDE 0.9 % (FLUSH) 0.9 %
10 SYRINGE (ML) INJECTION AS NEEDED
Status: DISCONTINUED | OUTPATIENT
Start: 2017-10-13 | End: 2017-10-13 | Stop reason: HOSPADM

## 2017-10-13 RX ORDER — SODIUM CHLORIDE 0.9 % (FLUSH) 0.9 %
10 SYRINGE (ML) INJECTION AS NEEDED
OUTPATIENT
Start: 2017-10-13

## 2017-10-13 RX ADMIN — Medication 10 ML: at 15:17

## 2017-10-13 RX ADMIN — SODIUM CHLORIDE, PRESERVATIVE FREE 500 UNITS: 5 INJECTION INTRAVENOUS at 15:17

## 2017-10-13 NOTE — PROGRESS NOTES
REASON FOR FOLLOW-UP:    1.  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  IPI 4 out of 5 = high risk.  2. Therapy with miniCHOP initiated on 5/5/2016, complete as of 8/19/2016.  (In retrospect, it appears she did not receive Rituxan).  Complete metabolic response to therapy by PET imaging.  3.  PET imaging on 3/22/2017 consistent with recurrence.  See below.    4.  Initiation of Rituxan weekly for 4 weeks on 4/3/2017.  Complete as of 4/25/2017.  5.  Initiation of bendamustine and Rituxan on 5/9/2017.    HISTORY OF PRESENT ILLNESS:  Guerita De La Fuente is a 84 y.o. female who received cycle #6 of bendamustine and Rituxan on 9/26/2017.     She is here today through triage with reports of extreme fatigue. She states that usually by this time in her cycle of treatment her energy level improves and she notices a distinct improvement. She denies pain, nausea or vomiting, swelling or chest pain. She denies fever or chills. She does report increased shortness of breath with activity .      She reports a decreased appetite citing food simply doesn't taste good to her.    Her neuropathy is stable with no new numbness or tingling.    ONCOLOGIC HISTORY:     Diffuse large B cell lymphoma    4/14/2016 Initial Diagnosis     Diffuse large B cell lymphoma by ultrasound-guided left supraclavicular lymph node biopsy.  Ki-67>95%.         4/28/2016 Imaging     PET scan:  There is fairly extensive lymphoma as described in detail  above. There are areas of intense metabolic activity localizing to the  spinal canal at the midthoracic level as well as low lumbar and sacral  levels. There is remarkably little detectable abnormality at these  levels by CT. MRI would be the best means for further delineation of  anatomic pathology. The degree of activity certainly could indicate an  underlying mass and cord compression should be evaluated. Furthermore,  there is noted a very small nodular  focus of uptake within the upper  inner quadrant of the left breast which appears to correspond to tiny  nodular foci seen at concurrent CT imaging. The finding is nonspecific,  however should be closely followed after appropriate therapy for  lymphoma to ensure resolution and to exclude a possible breast  malignancy.         4/29/2016 Surgery     Mediport placed by Dr. Mihir Arrieta.         5/5/2016 - 8/19/2016 Chemotherapy     R-miniCHOP         9/9/2016 Imaging     PET scan:  There has been a dramatic interval improvement. There has been  resolution of all of the previously seen intensely hypermetabolic  lesions involving the thorax, spine, retroperitoneum, and pelvis.      There is a new focus of hypermetabolic activity at the mid sigmoid colon  where there is mild acute diverticulitis, image 284.         3/22/2017 Imaging     PET scan:  IMPRESSION:  1. The tiny hypermetabolic nodes at the left axilla and left internal  mammary chain are similar to the hypermetabolic nodes seen on the PET/CT  from 04/28/2016. This suggests recurrence. The new hypermetabolic focus  within the left T2 lamina is concerning for a metastasis as well.  2. There is unusual hypermetabolic activity within the central aspect of  the right hepatic lobe corresponds to a much larger right portal vein  than on previous examinations. This suggests right portal vein  thrombosis. Further evaluation with contrast-enhanced CT of the abdomen  is recommended.  3. The symmetrical hypermetabolic activity throughout both lung fields  is of uncertain etiology as no airspace consolidations are present on  the corresponding CT. Extensive pneumonitis may have diffuse lung  activity, but the appearance on CT is not suggestive of pneumonitis. The  etiology of this finding is uncertain.         3/24/2017 Imaging     IMPRESSION:  Mildly enlarged bilateral axillary and mediastinal and left  internal mammary chain lymph nodes that showed mild hypermetabolism on  "a  recent PET/CT study consistent with recurrent lymphoma. No lung  abnormalities are identified to account for the low level abnormal  uptake noted on the PET/CT study. There is also no evidence of a lytic  or blastic lesion in the left lamina of T2 were the PET/CT study showed  focal increased activity. This does not exclude metastatic disease in  this location. Images of the abdomen and pelvis show no evidence of  recurrent lymphoma. There is no evidence of portal vein thrombosis.         4/3/2017 - 4/25/2017 Chemotherapy     OP LYMPHOMA (CLL) RiTUXimab (Weekly X 4)           5/9/2017 -  Chemotherapy     OP LYMPHOMA Bendamustine 90 mg/m2 / RiTUXimab 375 mg/m2           6/27/2017 Imaging     CT imaging with improved lymphadenopathy            PAST MEDICAL, SURGICAL, FAMILY, AND SOCIAL HISTORIES were reviewed with the patient and in the electronic medical record, and were updated if indicated.    ALLERGIES:  Allergies   Allergen Reactions   • Allopurinol    • Codeine GI Intolerance   • Levaquin [Levofloxacin]    • Shellfish-Derived Products Other (See Comments)     \"CRABS\" \"I CAN'T REMEMBER WHAT THE REACTION WAS\"   • Zofran [Ondansetron Hcl] Hives   • Penicillins Rash   • Sulfa Antibiotics Rash       MEDICATIONS:  The medication list has been reviewed with the patient by the medical assistant, and the list has been updated in the electronic medical record, which I reviewed.  Medication dosages and frequencies were confirmed to be accurate.    REVIEW OF SYSTEMS:  PAIN:  See Vital Signs below.  GENERAL:  See HPI  SKIN: No rash  HEME/LYMPH:  No abnormal bleeding.    EYES:  No vision changes or diplopia.  ENT:  No sore throat or difficulty swallowing.  RESPIRATORY:See HPI  CARDIOVASCULAR:  No chest pain, palpitations, orthopnea.  Mild dyspnea on exertion.  GASTROINTESTINAL:  See history of present illness  GENITOURINARY:  See history of present illness  MUSCULOSKELETAL:  Some hip and back pain this morning which has " "improved  NEUROLOGIC:  Grade 1-2 peripheral neuropathy, stable.  PSYCHIATRIC:  No depression, anxiety, or mood changes.    Vitals:    10/13/17 1515   BP: 154/80   Pulse: 93   Resp: 18   Temp: 97.5 °F (36.4 °C)   SpO2: 95%   Weight: 140 lb 3.2 oz (63.6 kg)   Height: 65.5\" (166.4 cm)   PainSc: 0-No pain     ECOG 1    PHYSICAL EXAMINATION:  GENERAL:  Ill appearing female accompanied by her daughter today.  SKIN:  No rash or nonhealing lesions  HEAD:  Normocephalic, atraumatic.  EYES:  Pupils equal, round and reactive to light.  Extraocular movements intact.  Conjunctivae normal.  EARS:  Hearing intact.  NOSE:  Septum midline.  No excoriations or nasal discharge.  MOUTH:  No stomatitis or ulcers.  Lips are normal.  No gingival erythema.  THROAT:  Oropharynx without lesions or exudates.  NECK:  Supple with good range of motion; no thyromegaly or masses; no JVD or bruits.  CHEST:  Lungs are clear to auscultation bilaterally.  SHe does appear slightly dyspneic when speaking.  A Mediport is present in the right subclavian area that is benign in appearance.  HEART:  Regular rate and rhythm.  No murmurs, gallops or rubs.  ABDOMEN:  Soft, non-tender, non-distended.  Normal active bowel sounds.  No organomegaly.  EXTREMITIES:  No clubbing, cyanosis, or edema.  NEUROLOGICAL:  Mild decreased sensation to light touch present on her fingertips    DIAGNOSTIC DATA:  Lab Results   Component Value Date    WBC 4.29 10/13/2017    HGB 11.6 10/13/2017    HCT 34.9 10/13/2017    MCV 89.3 10/13/2017    PLT 69 (L) 10/13/2017       Chemistry        Component Value Date/Time     (L) 10/13/2017 1459    K 3.9 10/13/2017 1459    CL 98 10/13/2017 1459    CO2 20.6 (L) 10/13/2017 1459    BUN 17 10/13/2017 1459    CREATININE 0.68 10/13/2017 1459    CREATININE 0.80 06/27/2017 0814        Component Value Date/Time    CALCIUM 8.7 10/13/2017 1459    ALKPHOS 176 (H) 10/13/2017 1459     (C) 10/13/2017 1459    ALT 75 (H) 10/13/2017 1459    " BILITOT 0.4 10/13/2017 1459          IMAGING:  CT imaging from 6/27/2017.  No lymphadenopathy.  Lungs and liver appear normal as they did on prior CT imaging.    ASSESSMENT:  This is a 84 y.o. female with:  1.  Stage IVb aggressive appearing diffuse large B-cell lymphoma.  IPI score was 4.  There was likely central nervous system involvement in the spinal canal.  She declined intrathecal therapy.  She completed 6 cycles of therapy with miniCHOP with a complete response.  On review of her chemotherapy regimen from 2016 it actually does not appear that she received Rituxan with her chemotherapy, although frankly this was intended.     The patient received Rituxan weekly ×4 beginning 4/3/2017.  She completed 4 weeks and tolerated this well with the addition of Solu-Cortef 100 mg for tongue swelling.      She initiated bendamustine and Rituxan 5/9/17, again with Solu-Cortef 100 mg as premedication.  She tolerated treatment overall well with exception of some constipation and continued nightly mild tongue swelling.      She received cycle #6 on 9/26/2017.    2.  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    3.  Nausea: She did not complain of this today.     4.  Global weakness: Progressing.    5.  Tongue swelling: Continue Solu-Cortef at 100 mg with treatment.  Continue Benadryl at night.  She will notify us if this worsens.    6.  Diarrhea: Likely chemotherapy related.  She does not report this.     7. Likely disease progression.  Her LDH has almost doubled from 1432 on 9/26/2017 to 2126 on 10/13/2017. Her alkaline Phos has elevated from 122 to 176. Her liver enzymes have also increased. This combined with her progressively poor performance status indicate likely disease progression.    PLAN:  1. I have reviewed the patient's clinical picture with Dr Ramírez today who agree Mrs. Chavarrias is likely progressing. I discussed the option of hospitalization with the patient and her daughter today.  She would like to go home and spend time with her family today. We plan to move her PET Scan up to 10/19/2017 to be reviewed by Dr Ramírez on 10/26/2017. I have also informed the patient that should her symptoms worsen she needs to call the office or go directly to the ED.     2. She will continue her Prednisone at 10mg BID.

## 2017-10-13 NOTE — PROGRESS NOTES
HPI   Guerita De La Fuente  is a 84 y.o. female who presents for counseling regarding hormone replacement    Chief Complaint   Patient presents with   • Med Refill     Premarin. She does not want an exam. She has cancer and seems to be very worn out.       Past Medical History:   Diagnosis Date   • Arthritis     osteoarthritis   • Cancer     LYMPHOMA   • GERD (gastroesophageal reflux disease)    • Hip pain    • History of transfusion    • Macular degeneration of both eyes    • Muscle ache    • Torn ligament     right shoulder       Past Surgical History:   Procedure Laterality Date   • CARPAL TUNNEL RELEASE Bilateral    • EYE SURGERY Bilateral     cataracts   • HIP PINNING Right 07/1992   • HYSTERECTOMY     • MAMMO BILATERAL  2014    normal   • ORTHOPEDIC SURGERY      hip right 4 pins   • IA INSJ TUNNELED CVC W/O SUBQ PORT/ AGE 5 YR/> N/A 4/29/2016    Procedure: INSERTION POWERPORT WITH FLURO;  Surgeon: Mihir Arrieta MD;  [place] [facility] [place] [facility] [place]  Service: General   • TONSILLECTOMY AND ADENOIDECTOMY     • VEIN SURGERY Bilateral     LEGS       Social History     Social History   • Marital status:      Spouse name: N/A   • Number of children: 6   • Years of education: College     Occupational History   • RN Retired     Social History Main Topics   • Smoking status: Never Smoker   • Smokeless tobacco: Never Used   • Alcohol use No   • Drug use: No   • Sexual activity: Defer     Other Topics Concern   • Not on file     Social History Narrative       The following portions of the patient's history were reviewed and updated as appropriate: allergies, current medications, past family history, past medical history, past social history, past surgical history and problem list.    Review of Systems    Objective     Physical Exam    Assessment    There are no diagnoses linked to this encounter.    Plan  1. 15 minutes of a 20 minute visit were spent in direct face-to-face counseling regarding menopausal symptoms.  The  patient takes Premarin 0.3 mg orally.  She is being treated with chemotherapy for lymphoma.  We discussed the risks and benefits of continuing hormone replacement therapy.  I am concerned about heart attack, stroke and deep vein thrombosis.  I strongly recommended to the patient and her daughter that hormone replacement be stopped.  The patient feels equally strongly that it should continue.  We discussed in detail the benefits and risks as well as alternatives for treating the patient's symptoms.  She expresses hot flashes night sweats and mood swings.  She would like to continue hormone replacement.  She is willing to change from oral to transdermal to minimize the cardiac risk.  A prescription has been sent for Estrogel and the patient has been counseled regarding its proper use.    History   Smoking Status   • Never Smoker   2.     3.     4.

## 2017-10-17 ENCOUNTER — OFFICE VISIT (OUTPATIENT)
Dept: ONCOLOGY | Facility: CLINIC | Age: 82
End: 2017-10-17

## 2017-10-17 ENCOUNTER — LAB (OUTPATIENT)
Dept: LAB | Facility: HOSPITAL | Age: 82
End: 2017-10-17

## 2017-10-17 VITALS
SYSTOLIC BLOOD PRESSURE: 122 MMHG | TEMPERATURE: 97.4 F | HEIGHT: 66 IN | WEIGHT: 140.6 LBS | RESPIRATION RATE: 16 BRPM | DIASTOLIC BLOOD PRESSURE: 70 MMHG | HEART RATE: 92 BPM | BODY MASS INDEX: 22.6 KG/M2

## 2017-10-17 DIAGNOSIS — C83.30 DIFFUSE LARGE B-CELL LYMPHOMA, UNSPECIFIED BODY REGION (HCC): Primary | ICD-10-CM

## 2017-10-17 LAB
ALBUMIN SERPL-MCNC: 3.3 G/DL (ref 3.5–5.2)
ALBUMIN/GLOB SERPL: 1.3 G/DL (ref 1.1–2.4)
ALP SERPL-CCNC: 170 U/L (ref 38–116)
ALT SERPL W P-5'-P-CCNC: 38 U/L (ref 0–33)
ANION GAP SERPL CALCULATED.3IONS-SCNC: 14.1 MMOL/L
AST SERPL-CCNC: 342 U/L (ref 0–32)
BASOPHILS # BLD AUTO: 0.02 10*3/MM3 (ref 0–0.1)
BASOPHILS NFR BLD AUTO: 0.4 % (ref 0–1.1)
BILIRUB SERPL-MCNC: 0.5 MG/DL (ref 0.1–1.2)
BUN BLD-MCNC: 18 MG/DL (ref 6–20)
BUN/CREAT SERPL: 23.1 (ref 7.3–30)
CALCIUM SPEC-SCNC: 8.9 MG/DL (ref 8.5–10.2)
CHLORIDE SERPL-SCNC: 100 MMOL/L (ref 98–107)
CO2 SERPL-SCNC: 19.9 MMOL/L (ref 22–29)
CREAT BLD-MCNC: 0.78 MG/DL (ref 0.6–1.1)
DEPRECATED RDW RBC AUTO: 49.1 FL (ref 37–49)
EOSINOPHIL # BLD AUTO: 0.04 10*3/MM3 (ref 0–0.36)
EOSINOPHIL NFR BLD AUTO: 0.7 % (ref 1–5)
ERYTHROCYTE [DISTWIDTH] IN BLOOD BY AUTOMATED COUNT: 15 % (ref 11.7–14.5)
GFR SERPL CREATININE-BSD FRML MDRD: 70 ML/MIN/1.73
GLOBULIN UR ELPH-MCNC: 2.6 GM/DL (ref 1.8–3.5)
GLUCOSE BLD-MCNC: 95 MG/DL (ref 74–124)
HCT VFR BLD AUTO: 34.7 % (ref 34–45)
HGB BLD-MCNC: 11.4 G/DL (ref 11.5–14.9)
IMM GRANULOCYTES # BLD: 0.06 10*3/MM3 (ref 0–0.03)
IMM GRANULOCYTES NFR BLD: 1.1 % (ref 0–0.5)
LDH SERPL-CCNC: 2792 U/L (ref 99–259)
LYMPHOCYTES # BLD AUTO: 0.2 10*3/MM3 (ref 1–3.5)
LYMPHOCYTES NFR BLD AUTO: 3.7 % (ref 20–49)
MCH RBC QN AUTO: 29.7 PG (ref 27–33)
MCHC RBC AUTO-ENTMCNC: 32.9 G/DL (ref 32–35)
MCV RBC AUTO: 90.4 FL (ref 83–97)
MONOCYTES # BLD AUTO: 0.82 10*3/MM3 (ref 0.25–0.8)
MONOCYTES NFR BLD AUTO: 15 % (ref 4–12)
NEUTROPHILS # BLD AUTO: 4.31 10*3/MM3 (ref 1.5–7)
NEUTROPHILS NFR BLD AUTO: 79.1 % (ref 39–75)
NRBC BLD MANUAL-RTO: 0 /100 WBC (ref 0–0)
PLATELET # BLD AUTO: 65 10*3/MM3 (ref 150–375)
PMV BLD AUTO: 11.7 FL (ref 8.9–12.1)
POTASSIUM BLD-SCNC: 3.9 MMOL/L (ref 3.5–4.7)
PROT SERPL-MCNC: 5.9 G/DL (ref 6.3–8)
RBC # BLD AUTO: 3.84 10*6/MM3 (ref 3.9–5)
SODIUM BLD-SCNC: 134 MMOL/L (ref 134–145)
WBC NRBC COR # BLD: 5.45 10*3/MM3 (ref 4–10)

## 2017-10-17 PROCEDURE — 80053 COMPREHEN METABOLIC PANEL: CPT | Performed by: NURSE PRACTITIONER

## 2017-10-17 PROCEDURE — 83615 LACTATE (LD) (LDH) ENZYME: CPT | Performed by: NURSE PRACTITIONER

## 2017-10-17 PROCEDURE — 85025 COMPLETE CBC W/AUTO DIFF WBC: CPT | Performed by: NURSE PRACTITIONER

## 2017-10-17 PROCEDURE — 36415 COLL VENOUS BLD VENIPUNCTURE: CPT | Performed by: NURSE PRACTITIONER

## 2017-10-17 PROCEDURE — 99214 OFFICE O/P EST MOD 30 MIN: CPT | Performed by: NURSE PRACTITIONER

## 2017-10-17 RX ORDER — PROCHLORPERAZINE MALEATE 10 MG
TABLET ORAL
Qty: 385 TABLET | Refills: 2 | OUTPATIENT
Start: 2017-10-17

## 2017-10-17 RX ORDER — PROCHLORPERAZINE MALEATE 10 MG
10 TABLET ORAL EVERY 6 HOURS PRN
Qty: 30 TABLET | Refills: 2 | Status: SHIPPED | OUTPATIENT
Start: 2017-10-17

## 2017-10-17 NOTE — PROGRESS NOTES
REASON FOR FOLLOW-UP:    1.  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  IPI 4 out of 5 = high risk.  2. Therapy with miniCHOP initiated on 5/5/2016, complete as of 8/19/2016.  (In retrospect, it appears she did not receive Rituxan).  Complete metabolic response to therapy by PET imaging.  3.  PET imaging on 3/22/2017 consistent with recurrence.  See below.    4.  Initiation of Rituxan weekly for 4 weeks on 4/3/2017.  Complete as of 4/25/2017.  5.  Initiation of bendamustine and Rituxan on 5/9/2017.    HISTORY OF PRESENT ILLNESS:  Guerita De La Fuente is a 84 y.o. female who received cycle #6 of bendamustine and Rituxan on 9/26/2017.     She is here for scheduled follow up and lab review. I saw her last Friday through triage with reports of progressive weakness. Her clinical picture and lab work indicate disease progressions and discussion about palliative care was introduced. She wanted to go home for the weekend and return for this week after speaking to her family. Her PET Scan was moved up to this Thursday with plans to review with Dr Ramírez on 10/26/2017.    She today with reports of continued weakness. She denies pain, fevers, chills, or swelling. She does report significant shortness of breath with exertion. She is using a wheelchair today which has not been required in prior visits.     She continues to experience a decreased appetite citing food simply doesn't taste good to her.    Her neuropathy is stable with no new numbness or tingling.    She reports diarrhea that is controlled with cholestyramine.    ONCOLOGIC HISTORY:     Diffuse large B cell lymphoma    4/14/2016 Initial Diagnosis     Diffuse large B cell lymphoma by ultrasound-guided left supraclavicular lymph node biopsy.  Ki-67>95%.         4/28/2016 Imaging     PET scan:  There is fairly extensive lymphoma as described in detail  above. There are areas of intense metabolic activity localizing  to the  spinal canal at the midthoracic level as well as low lumbar and sacral  levels. There is remarkably little detectable abnormality at these  levels by CT. MRI would be the best means for further delineation of  anatomic pathology. The degree of activity certainly could indicate an  underlying mass and cord compression should be evaluated. Furthermore,  there is noted a very small nodular focus of uptake within the upper  inner quadrant of the left breast which appears to correspond to tiny  nodular foci seen at concurrent CT imaging. The finding is nonspecific,  however should be closely followed after appropriate therapy for  lymphoma to ensure resolution and to exclude a possible breast  malignancy.         4/29/2016 Surgery     Mediport placed by Dr. Mihir Arrieta.         5/5/2016 - 8/19/2016 Chemotherapy     R-miniCHOP         9/9/2016 Imaging     PET scan:  There has been a dramatic interval improvement. There has been  resolution of all of the previously seen intensely hypermetabolic  lesions involving the thorax, spine, retroperitoneum, and pelvis.      There is a new focus of hypermetabolic activity at the mid sigmoid colon  where there is mild acute diverticulitis, image 284.         3/22/2017 Imaging     PET scan:  IMPRESSION:  1. The tiny hypermetabolic nodes at the left axilla and left internal  mammary chain are similar to the hypermetabolic nodes seen on the PET/CT  from 04/28/2016. This suggests recurrence. The new hypermetabolic focus  within the left T2 lamina is concerning for a metastasis as well.  2. There is unusual hypermetabolic activity within the central aspect of  the right hepatic lobe corresponds to a much larger right portal vein  than on previous examinations. This suggests right portal vein  thrombosis. Further evaluation with contrast-enhanced CT of the abdomen  is recommended.  3. The symmetrical hypermetabolic activity throughout both lung fields  is of uncertain etiology as  "no airspace consolidations are present on  the corresponding CT. Extensive pneumonitis may have diffuse lung  activity, but the appearance on CT is not suggestive of pneumonitis. The  etiology of this finding is uncertain.         3/24/2017 Imaging     IMPRESSION:  Mildly enlarged bilateral axillary and mediastinal and left  internal mammary chain lymph nodes that showed mild hypermetabolism on a  recent PET/CT study consistent with recurrent lymphoma. No lung  abnormalities are identified to account for the low level abnormal  uptake noted on the PET/CT study. There is also no evidence of a lytic  or blastic lesion in the left lamina of T2 were the PET/CT study showed  focal increased activity. This does not exclude metastatic disease in  this location. Images of the abdomen and pelvis show no evidence of  recurrent lymphoma. There is no evidence of portal vein thrombosis.         4/3/2017 - 4/25/2017 Chemotherapy     OP LYMPHOMA (CLL) RiTUXimab (Weekly X 4)           5/9/2017 -  Chemotherapy     OP LYMPHOMA Bendamustine 90 mg/m2 / RiTUXimab 375 mg/m2           6/27/2017 Imaging     CT imaging with improved lymphadenopathy            PAST MEDICAL, SURGICAL, FAMILY, AND SOCIAL HISTORIES were reviewed with the patient and in the electronic medical record, and were updated if indicated.    ALLERGIES:  Allergies   Allergen Reactions   • Allopurinol    • Codeine GI Intolerance   • Levaquin [Levofloxacin]    • Shellfish-Derived Products Other (See Comments)     \"CRABS\" \"I CAN'T REMEMBER WHAT THE REACTION WAS\"   • Zofran [Ondansetron Hcl] Hives   • Penicillins Rash   • Sulfa Antibiotics Rash       MEDICATIONS:  The medication list has been reviewed with the patient by the medical assistant, and the list has been updated in the electronic medical record, which I reviewed.  Medication dosages and frequencies were confirmed to be accurate.    REVIEW OF SYSTEMS:  PAIN:  See Vital Signs below.  GENERAL:  See HPI  SKIN: No " "rash  HEME/LYMPH:  No abnormal bleeding.    EYES:  No vision changes or diplopia.  ENT:  No sore throat or difficulty swallowing.  RESPIRATORY:See HPI  CARDIOVASCULAR:  No chest pain, palpitations, orthopnea.  Mild dyspnea on exertion.  GASTROINTESTINAL:  See history of present illness  GENITOURINARY:  See history of present illness  MUSCULOSKELETAL:  Some hip and back pain this morning which has improved  NEUROLOGIC:  Grade 1-2 peripheral neuropathy, stable.  PSYCHIATRIC:  No depression, anxiety, or mood changes.    Vitals:    10/17/17 1125   BP: 122/70   Pulse: 92   Resp: 16   Temp: 97.4 °F (36.3 °C)   Weight: 140 lb 9.6 oz (63.8 kg)   Height: 65.5\" (166.4 cm)   PainSc: 0-No pain     ECOG 1    PHYSICAL EXAMINATION:  GENERAL:  Ill appearing female accompanied by her daughter today. She is using a wheelchair today and has not required this in prior visits.  SKIN:  No rash or nonhealing lesions  HEAD:  Normocephalic, atraumatic.  EYES:  Pupils equal, round and reactive to light.  Extraocular movements intact.  Conjunctivae normal.  EARS:  Hearing intact.  NOSE:  Septum midline.  No excoriations or nasal discharge.  MOUTH:  No stomatitis or ulcers.  Lips are normal.  No gingival erythema.  THROAT:  Oropharynx without lesions or exudates.  NECK:  Supple with good range of motion; no thyromegaly or masses; no JVD or bruits.  CHEST:  Lungs are clear to auscultation bilaterally.  SHe does appear slightly dyspneic when speaking.  A Mediport is present in the right subclavian area that is benign in appearance.  HEART:  Regular rate and rhythm.  No murmurs, gallops or rubs.  ABDOMEN:  Soft, non-tender, non-distended.  Normal active bowel sounds.  No organomegaly.  EXTREMITIES:  No clubbing, cyanosis, or edema.  NEUROLOGICAL:  Mild decreased sensation to light touch present on her fingertips    DIAGNOSTIC DATA:  Lab Results   Component Value Date    WBC 5.45 10/17/2017    HGB 11.4 (L) 10/17/2017    HCT 34.7 10/17/2017    MCV " 90.4 10/17/2017    PLT 65 (L) 10/17/2017       Chemistry        Component Value Date/Time     10/17/2017 1044    K 3.9 10/17/2017 1044     10/17/2017 1044    CO2 19.9 (L) 10/17/2017 1044    BUN 18 10/17/2017 1044    CREATININE 0.78 10/17/2017 1044    CREATININE 0.80 06/27/2017 0814        Component Value Date/Time    CALCIUM 8.9 10/17/2017 1044    ALKPHOS 170 (H) 10/17/2017 1044     (C) 10/17/2017 1044    ALT 38 (H) 10/17/2017 1044    BILITOT 0.5 10/17/2017 1044          IMAGING:  CT imaging from 6/27/2017.  No lymphadenopathy.  Lungs and liver appear normal as they did on prior CT imaging.    ASSESSMENT:  This is a 84 y.o. female with:  1.  Stage IVb aggressive appearing diffuse large B-cell lymphoma.  IPI score was 4.  There was likely central nervous system involvement in the spinal canal.  She declined intrathecal therapy.  She completed 6 cycles of therapy with miniCHOP with a complete response.  On review of her chemotherapy regimen from 2016 it actually does not appear that she received Rituxan with her chemotherapy, although frankly this was intended.     The patient received Rituxan weekly ×4 beginning 4/3/2017.  She completed 4 weeks and tolerated this well with the addition of Solu-Cortef 100 mg for tongue swelling.      She initiated bendamustine and Rituxan 5/9/17, again with Solu-Cortef 100 mg as premedication.  She tolerated treatment overall well with exception of some constipation and continued nightly mild tongue swelling.      She received cycle #6 on 9/26/2017.      2.  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    3.  Nausea: Becoming worse over the weekend. She is requesting a prescription for Comapzine today.     4.  Global weakness: Progressing. She has become even weaker today requiring a wheelchair.    5.  Tongue swelling: Continue Solu-Cortef at 100 mg with treatment.  Continue Benadryl at night.  She will notify us if this worsens.    6.   Diarrhea: Likely chemotherapy related.  This is controlled with cholestyrine.    7. Likely disease progression.  Her LDH, ALK PHOS, and liver enzymes continue to increase.    PLAN:  1. I have discussed [facility] today with the patient and her daughter. The patient wishes to go home and at this time does not want to die in the hospital. She has requested that [facility] contact her daughter to discuss services but she declines a formal referral at this time. I spke to Greer at [facility] who will be in contact with her daughter, Karyn, this afternoon.    2. We will keep her schedule as is with a PET Thursday to be reviewed with Dr Ramírez next week. I have explained that should she wish to pursue a formal [facility] referal she may call the office at any time to facilitate this.      3. She will continue her Prednisone at 10mg BID.    4. I have e-scribed Compazine to her pharmacy today.

## 2017-10-19 ENCOUNTER — HOSPITAL ENCOUNTER (OUTPATIENT)
Dept: PET IMAGING | Facility: HOSPITAL | Age: 82
Discharge: HOME | End: 2017-10-19
Attending: INTERNAL MEDICINE

## 2017-10-19 ENCOUNTER — HOSPITAL ENCOUNTER (OUTPATIENT)
Dept: PET IMAGING | Facility: HOSPITAL | Age: 82
Discharge: HOME | End: 2017-10-19
Attending: INTERNAL MEDICINE | Admitting: INTERNAL MEDICINE

## 2017-10-19 DIAGNOSIS — C83.34 DIFFUSE LARGE B-CELL LYMPHOMA OF LYMPH NODES OF UPPER EXTREMITY (HCC): ICD-10-CM

## 2017-10-19 PROCEDURE — A9552 F18 FDG: HCPCS | Performed by: INTERNAL MEDICINE

## 2017-10-19 PROCEDURE — 0 FLUDEOXYGLUCOSE F18 SOLUTION: Performed by: INTERNAL MEDICINE

## 2017-10-19 PROCEDURE — 78815 PET IMAGE W/CT SKULL-THIGH: CPT

## 2017-10-19 PROCEDURE — 82962 GLUCOSE BLOOD TEST: CPT

## 2017-10-19 RX ADMIN — FLUDEOXYGLUCOSE F18 1 DOSE: 300 INJECTION INTRAVENOUS at 12:37

## 2017-10-20 LAB — GLUCOSE BLDC GLUCOMTR-MCNC: 111 MG/DL (ref 70–130)

## 2017-10-23 RX ORDER — PREDNISONE 10 MG/1
TABLET ORAL
Qty: 60 TABLET | Refills: 2 | Status: SHIPPED | OUTPATIENT
Start: 2017-10-23

## 2017-10-25 ENCOUNTER — TELEPHONE (OUTPATIENT)
Dept: ONCOLOGY | Facility: CLINIC | Age: 82
End: 2017-10-25

## 2017-10-25 NOTE — TELEPHONE ENCOUNTER
I discussed the PET scan results today by phone with her daughter Karyn.  The patient is deteriorating quickly.  Hospice is involved.  She is requiring oxygen.  She is unable to get out of bed.  She is not eating well.    No treatment is possible.  Life expectancy measured in days.  Discussed this with her daughter.    Advised to let us know if there is anything we can do.

## 2017-10-26 ENCOUNTER — APPOINTMENT (OUTPATIENT)
Dept: ONCOLOGY | Facility: CLINIC | Age: 82
End: 2017-10-26

## 2017-10-26 ENCOUNTER — APPOINTMENT (OUTPATIENT)
Dept: OTHER | Facility: HOSPITAL | Age: 82
End: 2017-10-26

## 2017-10-27 ENCOUNTER — TELEPHONE (OUTPATIENT)
Dept: ONCOLOGY | Facility: HOSPITAL | Age: 82
End: 2017-10-27

## 2017-10-30 ENCOUNTER — APPOINTMENT (OUTPATIENT)
Dept: LAB | Facility: HOSPITAL | Age: 82
End: 2017-10-30

## 2017-11-07 ENCOUNTER — DOCUMENTATION (OUTPATIENT)
Dept: ONCOLOGY | Facility: HOSPITAL | Age: 82
End: 2017-11-07

## 2021-03-03 DIAGNOSIS — Z23 IMMUNIZATION DUE: ICD-10-CM
